# Patient Record
Sex: FEMALE | Race: WHITE | NOT HISPANIC OR LATINO | Employment: UNEMPLOYED | URBAN - METROPOLITAN AREA
[De-identification: names, ages, dates, MRNs, and addresses within clinical notes are randomized per-mention and may not be internally consistent; named-entity substitution may affect disease eponyms.]

---

## 2017-03-30 ENCOUNTER — TELEPHONE (OUTPATIENT)
Dept: OBGYN | Facility: CLINIC | Age: 32
End: 2017-03-30

## 2017-05-02 ENCOUNTER — OFFICE VISIT (OUTPATIENT)
Dept: FAMILY MEDICINE | Facility: CLINIC | Age: 32
End: 2017-05-02
Payer: COMMERCIAL

## 2017-05-02 VITALS
BODY MASS INDEX: 26.59 KG/M2 | DIASTOLIC BLOOD PRESSURE: 64 MMHG | WEIGHT: 144.5 LBS | HEIGHT: 62 IN | HEART RATE: 84 BPM | SYSTOLIC BLOOD PRESSURE: 103 MMHG | TEMPERATURE: 97.7 F

## 2017-05-02 DIAGNOSIS — N36.8 MASS OF URETHRA: ICD-10-CM

## 2017-05-02 DIAGNOSIS — Z01.419 ENCOUNTER FOR GYNECOLOGICAL EXAMINATION WITHOUT ABNORMAL FINDING: Primary | ICD-10-CM

## 2017-05-02 PROCEDURE — 99395 PREV VISIT EST AGE 18-39: CPT | Performed by: NURSE PRACTITIONER

## 2017-05-02 PROCEDURE — 87624 HPV HI-RISK TYP POOLED RSLT: CPT | Performed by: NURSE PRACTITIONER

## 2017-05-02 PROCEDURE — G0145 SCR C/V CYTO,THINLAYER,RESCR: HCPCS | Performed by: NURSE PRACTITIONER

## 2017-05-02 NOTE — PATIENT INSTRUCTIONS
Preventive Health Recommendations  Female Ages 26 - 39  Yearly exam:   See your health care provider every year in order to    Review health changes.     Discuss preventive care.      Review your medicines if you your doctor has prescribed any.    Until age 30: Get a Pap test every three years (more often if you have had an abnormal result).    After age 30: Talk to your doctor about whether you should have a Pap test every 3 years or have a Pap test with HPV screening every 5 years.   You do not need a Pap test if your uterus was removed (hysterectomy) and you have not had cancer.  You should be tested each year for STDs (sexually transmitted diseases), if you're at risk.   Talk to your provider about how often to have your cholesterol checked.  If you are at risk for diabetes, you should have a diabetes test (fasting glucose).  Shots: Get a flu shot each year. Get a tetanus shot every 10 years.   Nutrition:     Eat at least 5 servings of fruits and vegetables each day.    Eat whole-grain bread, whole-wheat pasta and brown rice instead of white grains and rice.    Talk to your provider about Calcium and Vitamin D.     Lifestyle    Exercise at least 150 minutes a week (30 minutes a day, 5 days of the week). This will help you control your weight and prevent disease.    Limit alcohol to one drink per day.    No smoking.     Wear sunscreen to prevent skin cancer.    See your dentist every six months for an exam and cleaning.        Thank you for choosing [facility].  You may be receiving a survey in the mail from [employer] regarding your visit today.  Please take a few minutes to complete and return the survey to let us know how we are doing.      If you have questions or concerns, please contact us via Cityzenith or you can contact your care team at [phone].    Our Clinic hours are:  Monday 6:40 am  to 7:00 pm  Tuesday -Friday 6:40 am to 5:00 pm    The [place] outpatient lab hours are:  Monday - Friday  6:10 am to 4:45 pm  Saturdays 7:00 am to 11:00 am  Appointments are required, call [phone]    If you have clinical questions after hours or would like to schedule an appointment,  call the clinic at [phone].

## 2017-05-02 NOTE — NURSING NOTE
"Chief Complaint   Patient presents with     Physical     Mass     Lump on Urethra      Health Maintenance     declined tetanus injection        Initial /64 (BP Location: Left arm, Patient Position: Chair, Cuff Size: Adult Regular)  Pulse 84  Temp 97.7  F (36.5  C) (Tympanic)  Ht 5' 2.25\" (1.581 m)  Wt 144 lb 8 oz (65.5 kg)  LMP 04/16/2017  Breastfeeding? No  BMI 26.22 kg/m2 Estimated body mass index is 26.22 kg/(m^2) as calculated from the following:    Height as of this encounter: 5' 2.25\" (1.581 m).    Weight as of this encounter: 144 lb 8 oz (65.5 kg).  Medication Reconciliation: complete    "

## 2017-05-02 NOTE — MR AVS SNAPSHOT
After Visit Summary   5/2/2017    Yvette Weiss    MRN: [id number]           Patient Information     Date Of Birth          1985        Visit Information        Provider Department      5/2/2017 11:00 Naa Valenzuela APRN [facility]        Today's Diagnoses     Encounter for gynecological examination without abnormal finding    -  1    Mass of urethra          Care Instructions      Preventive Health Recommendations  Female Ages 26 - 39  Yearly exam:   See your health care provider every year in order to    Review health changes.     Discuss preventive care.      Review your medicines if you your doctor has prescribed any.    Until age 30: Get a Pap test every three years (more often if you have had an abnormal result).    After age 30: Talk to your doctor about whether you should have a Pap test every 3 years or have a Pap test with HPV screening every 5 years.   You do not need a Pap test if your uterus was removed (hysterectomy) and you have not had cancer.  You should be tested each year for STDs (sexually transmitted diseases), if you're at risk.   Talk to your provider about how often to have your cholesterol checked.  If you are at risk for diabetes, you should have a diabetes test (fasting glucose).  Shots: Get a flu shot each year. Get a tetanus shot every 10 years.   Nutrition:     Eat at least 5 servings of fruits and vegetables each day.    Eat whole-grain bread, whole-wheat pasta and brown rice instead of white grains and rice.    Talk to your provider about Calcium and Vitamin D.     Lifestyle    Exercise at least 150 minutes a week (30 minutes a day, 5 days of the week). This will help you control your weight and prevent disease.    Limit alcohol to one drink per day.    No smoking.     Wear sunscreen to prevent skin cancer.    See your dentist every six months for an exam and cleaning.        Thank you for choosing [facility]  You may be  receiving a survey in the mail from [employer] regarding your visit today.  Please take a few minutes to complete and return the survey to let us know how we are doing.      If you have questions or concerns, please contact us via Emulate or you can contact your care team at [phone].    Our Clinic hours are:  Monday 6:40 am  to 7:00 pm  Tuesday -Friday 6:40 am to 5:00 pm    The [place] outpatient lab hours are:  Monday - Friday 6:10 am to 4:45 pm  Saturdays 7:00 am to 11:00 am  Appointments are required, call [phone]    If you have clinical questions after hours or would like to schedule an appointment,  call the clinic at [phone].          Follow-ups after your visit        Additional Services     UROLOGY ADULT REFERRAL       Your provider has referred you to: WALKER: [facility] [phone]   [email or web address]    Please be aware that coverage of these services is subject to the terms and limitations of your health insurance plan.  Call member services at your health plan with any benefit or coverage questions.      Please bring the following with you to your appointment:    (1) Any X-Rays, CTs or MRIs which have been performed.  Contact the facility where they were done to arrange for  prior to your scheduled appointment.    (2) List of current medications  (3) This referral request   (4) Any documents/labs given to you for this referral                  Future tests that were ordered for you today     Open Future Orders        Priority Expected Expires Ordered    Lipid panel reflex to direct LDL Routine  6/2/2017 5/2/2017    Glucose Routine  6/2/2017 5/2/2017            Who to contact     If you have questions or need follow up information about today's clinic visit or your schedule please contact [facility] directly at [phone].  Normal or non-critical lab and imaging results will be communicated to you by Elliot,  "letter or phone within 4 business days after the clinic has received the results. If you do not hear from us within 7 days, please contact the clinic through VoiceGem or phone. If you have a critical or abnormal lab result, we will notify you by phone as soon as possible.  Submit refill requests through VoiceGem or call your pharmacy and they will forward the refill request to us. Please allow 3 business days for your refill to be completed.          Additional Information About Your Visit        Managed Systemshardoggyloot Information     Voice[employer]em gives you secure access to your electronic health record. If you see a primary care provider, you can also send messages to your care team and make appointments. If you have questions, please call your primary care clinic.  If you do not have a primary care provider, please call [phone] and they will assist you.        Care EveryWhere ID     This is your Care EveryWhere ID. This could be used by other organizations to access your [place] medical records  [id number]        Your Vitals Were     Pulse Temperature Height Last Period Breastfeeding? BMI (Body Mass Index)    84 97.7  F (36.5  C) (Tympanic) 5' 2.25\" (1.581 m) 04/16/2017 No 26.22 kg/m2       Blood Pressure from Last 3 Encounters:   05/02/17 103/64   07/28/16 102/68   03/04/16 112/69    Weight from Last 3 Encounters:   05/02/17 144 lb 8 oz (65.5 kg)   07/28/16 145 lb (65.8 kg)   03/04/16 154 lb 12.8 oz (70.2 kg)              We Performed the Following     HPV High Risk Types DNA Cervical     Pap imaged thin layer screen with HPV - recommended age 30 - 65 years (select HPV order below)     UROLOGY ADULT REFERRAL        Primary Care Provider    Clinic Unassigned Corky Hong MD       No address on file        Thank you!     Thank you for choosing [facility]  for your care. Our goal is always to provide you with excellent care. Hearing back from our patients is one way we can continue to improve our services. Please take " a few minutes to complete the written survey that you may receive in the mail after your visit with us. Thank you!             Your Updated Medication List - Protect others around you: Learn how to safely use, store and throw away your medicines at www.disposemymeds.org.          This list is accurate as of: 5/2/17 11:32 AM.  Always use your most recent med list.                   Brand Name Dispense Instructions for use    KRILL OIL PO      Take 2 capsules by mouth daily       OMEGA-3 FATTY ACIDS PO      Take 1 capsule by mouth daily       prenatal multivitamin  plus iron 27-0.8 MG Tabs per tablet     100 tablet    Take 1 tablet by mouth daily       VITAMIN D PO      Take 1,000 Units by mouth daily

## 2017-05-02 NOTE — PROGRESS NOTES
SUBJECTIVE:     CC: Yvette Weiss is an 32 year old woman who presents for preventive health visit.   Chief Complaint   Patient presents with     Physical     Mass     Lump on Urethra      Health Maintenance     declined tetanus injection        Answers for HPI/ROS submitted by the patient on 4/29/2017   Annual Exam:  Getting at least 3 servings of Calcium per day:: Yes  Bi-annual eye exam:: NO  Dental care twice a year:: NO  Sleep apnea or symptoms of sleep apnea:: None  Diet:: Regular (no restrictions)  Frequency of exercise:: 4-5 days/week  Taking medications regularly:: Yes  Medication side effects:: None  Additional concerns today:: YES  Q1: Little interest or pleasure in doing things: 0=Not at all  Q2: Feeling down, depressed or hopeless: 0=Not at all  PHQ-2 Score: 0  Duration of exercise:: 30-45 minutes        Lump on Urethra, Painful       Duration: 2 months ago     Description (location/character/radiation): painful lump on urethra, changes in size     Intensity: mild to  moderate    Accompanying signs and symptoms: when lump is larger in size she states it makes it hard for urine to come out     History (similar episodes/previous evaluation): None    Precipitating or alleviating factors: None    Therapies tried and outcome: None       Today's PHQ-2 Score:   PHQ-2 ( 1999 Pfizer) 5/2/2017 4/29/2017   Q1: Little interest or pleasure in doing things 1 -   Q2: Feeling down, depressed or hopeless 0 -   PHQ-2 Score 1 -   Little interest or pleasure in doing things - Not at all   Feeling down, depressed or hopeless - Not at all   PHQ-2 Score - 0       Abuse: Current or Past(Physical, Sexual or Emotional)- No  Do you feel safe in your environment - Yes    Social History   Substance Use Topics     Smoking status: Former Smoker     Quit date: 1/20/2010     Smokeless tobacco: Never Used     Alcohol use Yes      Comment: occasional- quit with pregnancy     The patient does not drink >3 drinks per day nor >7 drinks  "per week.    No results for input(s): CHOL, HDL, LDL, TRIG, CHOLHDLRATIO, NHDL in the last 61636 hours.    Reviewed orders with patient.  Reviewed health maintenance and updated orders accordingly - Yes    Mammo Decision Support:  Mammogram not appropriate for this patient based on age.    Pertinent mammograms are reviewed under the imaging tab.    History of abnormal Pap smear: NO - age 30- 65 PAP every 3 years recommended    Reviewed and updated as needed this visit by clinical staff  Tobacco  Allergies  Meds  Med Hx  Surg Hx  Fam Hx  Soc Hx        Reviewed and updated as needed this visit by Provider            ROS:  C: NEGATIVE for fever, chills, change in weight  I: NEGATIVE for worrisome rashes, moles or lesions  E: NEGATIVE for vision changes or irritation  ENT: NEGATIVE for ear, mouth and throat problems  R: NEGATIVE for significant cough or SOB  B: NEGATIVE for masses, tenderness or discharge  CV: NEGATIVE for chest pain, palpitations or peripheral edema  GI: NEGATIVE for nausea, abdominal pain, heartburn, or change in bowel habits  : NEGATIVE for unusual urinary or vaginal symptoms. Periods are regular. Using condoms for birth control.  M: NEGATIVE for significant arthralgias or myalgia  N: NEGATIVE for weakness, dizziness or paresthesias  P: NEGATIVE for changes in mood or affect    Problem list, Medication list, Allergies, and Medical/Social/Surgical histories reviewed in EPIC and updated as appropriate.      OBJECTIVE:     /64 (BP Location: Left arm, Patient Position: Chair, Cuff Size: Adult Regular)  Pulse 84  Temp 97.7  F (36.5  C) (Tympanic)  Ht 5' 2.25\" (1.581 m)  Wt 144 lb 8 oz (65.5 kg)  LMP 04/16/2017  Breastfeeding? No  BMI 26.22 kg/m2  EXAM:  GENERAL: healthy, alert and no distress  EYES: Eyes grossly normal to inspection, PERRL and conjunctivae and sclerae normal  HENT: ear canals and TM's normal, nose and mouth without ulcers or lesions  NECK: no adenopathy, no asymmetry, " "masses, or scars and thyroid normal to palpation  RESP: lungs clear to auscultation - no rales, rhonchi or wheezes  BREAST: normal without masses, tenderness or nipple discharge and no palpable axillary masses or adenopathy  CV: regular rate and rhythm, normal S1 S2, no S3 or S4, no murmur, click or rub, no peripheral edema and peripheral pulses strong  ABDOMEN: soft, nontender, no hepatosplenomegaly, no masses and bowel sounds normal   (female): normal female external genitalia, vaginal mucosa pink, moist, well rugated and normal cervix, adnexae, and uterus without masses. There is a prominent 3 mm flesh colored lesion at the urethral opening.  MS: no gross musculoskeletal defects noted, no edema  SKIN: no suspicious lesions or rashes  NEURO: Normal strength and tone, mentation intact and speech normal  PSYCH: mentation appears normal, affect normal/bright    ASSESSMENT/PLAN:         ICD-10-CM    1. Encounter for gynecological examination without abnormal finding Z01.419 Pap imaged thin layer screen with HPV - recommended age 30 - 65 years (select HPV order below)     HPV High Risk Types DNA Cervical     Lipid panel reflex to direct LDL     Glucose   2. Mass of urethra N36.8 Etiology unclear: polyp vs cyst vs other.  Patient states that it intermittently swells and then blocks the flow of urine.  UROLOGY ADULT REFERRAL       COUNSELING:   Reviewed preventive health counseling, as reflected in patient instructions         reports that she quit smoking about 7 years ago. She has never used smokeless tobacco.    Estimated body mass index is 26.22 kg/(m^2) as calculated from the following:    Height as of this encounter: 5' 2.25\" (1.581 m).    Weight as of this encounter: 144 lb 8 oz (65.5 kg).   Weight management plan: Discussed healthy diet and exercise guidelines and patient will follow up in 12 months in clinic to re-evaluate.      The risks, benefits and treatment options of prescribed medications or other " treatments have been discussed with the patient. The patient verbalized their understanding and should call or follow up if no improvement or if they develop further problems.    JUNE Martin [facility]

## 2017-05-03 DIAGNOSIS — Z01.419 ENCOUNTER FOR GYNECOLOGICAL EXAMINATION WITHOUT ABNORMAL FINDING: ICD-10-CM

## 2017-05-03 LAB
CHOLEST SERPL-MCNC: 177 MG/DL
GLUCOSE SERPL-MCNC: 91 MG/DL (ref 70–99)
HDLC SERPL-MCNC: 77 MG/DL
LDLC SERPL CALC-MCNC: 83 MG/DL
NONHDLC SERPL-MCNC: 100 MG/DL
TRIGL SERPL-MCNC: 87 MG/DL

## 2017-05-03 PROCEDURE — 82947 ASSAY GLUCOSE BLOOD QUANT: CPT | Performed by: FAMILY MEDICINE

## 2017-05-03 PROCEDURE — 36415 COLL VENOUS BLD VENIPUNCTURE: CPT | Performed by: FAMILY MEDICINE

## 2017-05-03 PROCEDURE — 80061 LIPID PANEL: CPT | Performed by: FAMILY MEDICINE

## 2017-05-04 LAB
COPATH REPORT: NORMAL
PAP: NORMAL

## 2017-05-05 LAB
FINAL DIAGNOSIS: NORMAL
HPV HR 12 DNA CVX QL NAA+PROBE: NEGATIVE
HPV16 DNA SPEC QL NAA+PROBE: NEGATIVE
HPV18 DNA SPEC QL NAA+PROBE: NEGATIVE
SPECIMEN DESCRIPTION: NORMAL

## 2017-05-30 ENCOUNTER — OFFICE VISIT (OUTPATIENT)
Dept: UROLOGY | Facility: CLINIC | Age: 32
End: 2017-05-30
Payer: COMMERCIAL

## 2017-05-30 VITALS
SYSTOLIC BLOOD PRESSURE: 133 MMHG | WEIGHT: 144 LBS | BODY MASS INDEX: 26.13 KG/M2 | OXYGEN SATURATION: 100 % | HEART RATE: 72 BPM | DIASTOLIC BLOOD PRESSURE: 62 MMHG | RESPIRATION RATE: 16 BRPM

## 2017-05-30 DIAGNOSIS — R30.0 DYSURIA: Primary | ICD-10-CM

## 2017-05-30 LAB
ALBUMIN UR-MCNC: NEGATIVE MG/DL
APPEARANCE UR: CLEAR
BILIRUB UR QL STRIP: NEGATIVE
COLOR UR AUTO: YELLOW
GLUCOSE UR STRIP-MCNC: NEGATIVE MG/DL
HGB UR QL STRIP: NEGATIVE
KETONES UR STRIP-MCNC: NEGATIVE MG/DL
LEUKOCYTE ESTERASE UR QL STRIP: NEGATIVE
NITRATE UR QL: NEGATIVE
PH UR STRIP: 6 PH (ref 5–7)
SP GR UR STRIP: 1.01 (ref 1–1.03)
URN SPEC COLLECT METH UR: NORMAL
UROBILINOGEN UR STRIP-ACNC: 0.2 EU/DL (ref 0.2–1)

## 2017-05-30 PROCEDURE — 99243 OFF/OP CNSLTJ NEW/EST LOW 30: CPT | Mod: 25 | Performed by: UROLOGY

## 2017-05-30 PROCEDURE — 81003 URINALYSIS AUTO W/O SCOPE: CPT | Performed by: UROLOGY

## 2017-05-30 PROCEDURE — 52000 CYSTOURETHROSCOPY: CPT | Performed by: UROLOGY

## 2017-05-30 NOTE — MR AVS SNAPSHOT
After Visit Summary   5/30/2017    Yvette Weiss    MRN: [id number]           Patient Information     Date Of Birth          1985        Visit Information        Provider Department      5/30/2017 11:00 AM Andrew Harding MD [facility]        Today's Diagnoses     Dysuria    -  1       Follow-ups after your visit        Follow-up notes from your care team     Return if symptoms worsen or fail to improve.      Who to contact     If you have questions or need follow up information about today's clinic visit or your schedule please contact [facility] directly at [phone].  Normal or non-critical lab and imaging results will be communicated to you by MyChart, letter or phone within 4 business days after the clinic has received the results. If you do not hear from us within 7 days, please contact the clinic through [employer] or phone. If you have a critical or abnormal lab result, we will notify you by phone as soon as possible.  Submit refill requests through [employer] or call your pharmacy and they will forward the refill request to us. Please allow 3 business days for your refill to be completed.          Additional Information About Your Visit        MyChart Information     [employer] gives you secure access to your electronic health record. If you see a primary care provider, you can also send messages to your care team and make appointments. If you have questions, please call your primary care clinic.  If you do not have a primary care provider, please call [phone] and they will assist you.        Care EveryWhere ID     This is your Care EveryWhere ID. This could be used by other organizations to access your [place] medical records  [id number]        Your Vitals Were     Pulse Respirations Last Period Pulse Oximetry BMI (Body Mass Index)       72 16 04/16/2017 100% 26.13 kg/m2        Blood Pressure from Last 3 Encounters:   05/30/17 133/62   05/02/17 103/64    07/28/16 102/68    Weight from Last 3 Encounters:   05/30/17 144 lb (65.3 kg)   05/02/17 144 lb 8 oz (65.5 kg)   07/28/16 145 lb (65.8 kg)              We Performed the Following     *UA reflex to Microscopic and Culture ([place] [facility] (except [place] and [place])     CYSTOURETHROSCOPY        Primary Care Provider    Clinic Unassigned Corky Hong MD       No address on file        Thank you!     Thank you for choosing [facility]  for your care. Our goal is always to provide you with excellent care. Hearing back from our patients is one way we can continue to improve our services. Please take a few minutes to complete the written survey that you may receive in the mail after your visit with us. Thank you!             Your Updated Medication List - Protect others around you: Learn how to safely use, store and throw away your medicines at www.disposemymeds.org.          This list is accurate as of: 5/30/17 12:29 PM.  Always use your most recent med list.                   Brand Name Dispense Instructions for use    KRILL OIL PO      Take 2 capsules by mouth daily       OMEGA-3 FATTY ACIDS PO      Take 1 capsule by mouth daily       prenatal multivitamin  plus iron 27-0.8 MG Tabs per tablet     100 tablet    Take 1 tablet by mouth daily       VITAMIN D PO      Take 1,000 Units by mouth daily

## 2017-05-30 NOTE — NURSING NOTE
"Chief Complaint   Patient presents with     Consult     urinary incont and lump       Initial /62 (BP Location: Right arm, Patient Position: Chair, Cuff Size: Adult Regular)  Pulse 72  Resp 16  Wt 144 lb (65.3 kg)  LMP 04/16/2017  SpO2 100%  BMI 26.13 kg/m2 Estimated body mass index is 26.13 kg/(m^2) as calculated from the following:    Height as of 5/2/17: 5' 2.25\" (1.581 m).    Weight as of this encounter: 144 lb (65.3 kg).  Medication Reconciliation: complete   Ernestina Mendes LPN    "

## 2017-05-30 NOTE — PROGRESS NOTES
"Yvette Weiss is a 32 year old female seen in consultation for urethral lesion. Consult from Unassigned [facility].    Pt reports onset of urethral lesion about 3 wks ago, intermittent, \"it moves around,\" minimally tender if she presses on it when it is there, otherwise no pain, no hx similar issues. Was recently seen here and found to have: \"There is a prominent 3 mm flesh colored lesion at the urethral opening\" on exam on 5/2/17    Also voids about q 90 minutes during the day, noc x 3.    Rare BUD, primarily just when she was pregnant. Does not wear a pad for bladder control and does not soil her underwear.     Denies dysuria, gross hematuria, UTI's, prior  eval, bladder surgery, use of bladder meds.    Hx 2 vag deliveries, considering additional offspring.    Performs Kegel exercises on a regular basis, not sure if it is helping.    Some issues with constipation, BM now about q 2-3 days, does not feel constipated.    Drinks 3 caf/sugar Pepsi q day, 2.5 [place], one herbal tea.     Stay-at-home mom. 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     Social History Main Topics     Smoking status: Former Smoker     Quit date: 1/20/2010     Smokeless tobacco: Never Used     Alcohol use Yes      Comment: occasional- quit with pregnancy     Drug use: No     Sexual activity: Yes     Partners: Male     Other Topics Concern     Parent/Sibling W/ Cabg, Mi Or Angioplasty Before 65f 55m? No     Adopted     Social History Narrative "       Current Outpatient Prescriptions   Medication Sig Dispense Refill     OMEGA-3 FATTY ACIDS PO Take 1 capsule by mouth daily        Prenatal Vit-Fe Fumarate-FA (PRENATAL MULTIVITAMIN  PLUS IRON) 27-0.8 MG TABS Take 1 tablet by mouth daily (Patient taking differently: Take 1 tablet by mouth daily ) 100 tablet 0     KRILL OIL PO Take 2 capsules by mouth daily        Cholecalciferol (VITAMIN D PO) Take 1,000 Units by mouth daily          Physical Exam:    GENL: NAD.    ABD: Soft, non-tender, no masses.    EG: Well-estrogenized, no masses; pt points to her clitoris as the 'lump' > non inflamed, non tender.    VAGINA: Well-estrogenized, no masses.    BN HYPERMOBILITY: Mild.    CYSTOCELE: None.    APICAL PROLAPSE: None.    RECTOCELE: None.    BIMANUAL: No mass or tenderness.    Cysto:    (Informed consent obtained. Pause for cause performed)   Sterile prep.    17 Fr scope inserted through urethra. Systematic examination w 70 degree lens.   PVR: 20 cc   MUCOSA: Normal without lesion; mild trabeculation throughout   ORIFICES: Normal location and morphology   CAPACITY: 400 cc; no pain with filling   Scope withdrawn without untoward effect.    Valsalva:   Minimal hypermobility, no leakage noted.    (Pt tolerated procedure without difficulty).      Results for orders placed or performed in visit on 05/30/17   *UA reflex to Microscopic and Culture ([place] and [facility] (except [place] and [place])   Result Value Ref Range    Color Urine Yellow     Appearance Urine Clear     Glucose Urine Negative NEG mg/dL    Bilirubin Urine Negative NEG    Ketones Urine Negative NEG mg/dL    Specific Gravity Urine 1.010 1.003 - 1.035    Blood Urine Negative NEG    pH Urine 6.0 5.0 - 7.0 pH    Protein Albumin Urine Negative NEG mg/dL    Urobilinogen Urine 0.2 0.2 - 1.0 EU/dL    Nitrite Urine Negative NEG    Leukocyte Esterase Urine Negative NEG    Source Midstream Urine            IMP:  1. Normal external genitalia on exam today;  sounds like may be urethral polyp; discussed in some detail  2. Hx minimal BUD; unable to replicate on exam today  3. Fair bit of Pepsi >> urinary frequency      PLAN:  1. Discussed situation with patient in detail, including negative findings on exam  2. Discussed her hx BUD; could address surgically if it becomes more problematic  3. Consider moderation of soda  4. RTC to us only PRN recurrence  5. 45 minutes spent with patient, more than 50% spent in counseling and coordination of care for urethral lesion.  6. Copy TRI [place]

## 2017-09-27 ENCOUNTER — OFFICE VISIT (OUTPATIENT)
Dept: FAMILY MEDICINE | Facility: CLINIC | Age: 32
End: 2017-09-27
Payer: COMMERCIAL

## 2017-09-27 ENCOUNTER — OFFICE VISIT (OUTPATIENT)
Dept: UROLOGY | Facility: CLINIC | Age: 32
End: 2017-09-27
Payer: COMMERCIAL

## 2017-09-27 VITALS
BODY MASS INDEX: 25.76 KG/M2 | HEIGHT: 62 IN | WEIGHT: 140 LBS | HEART RATE: 89 BPM | DIASTOLIC BLOOD PRESSURE: 63 MMHG | TEMPERATURE: 98.3 F | SYSTOLIC BLOOD PRESSURE: 111 MMHG

## 2017-09-27 DIAGNOSIS — R30.0 DYSURIA: Primary | ICD-10-CM

## 2017-09-27 DIAGNOSIS — N36.8 PERIURETHRAL MASS: Primary | ICD-10-CM

## 2017-09-27 LAB
ALBUMIN UR-MCNC: NEGATIVE MG/DL
APPEARANCE UR: CLEAR
BILIRUB UR QL STRIP: NEGATIVE
COLOR UR AUTO: YELLOW
GLUCOSE UR STRIP-MCNC: NEGATIVE MG/DL
HGB UR QL STRIP: NEGATIVE
KETONES UR STRIP-MCNC: NEGATIVE MG/DL
LEUKOCYTE ESTERASE UR QL STRIP: ABNORMAL
NITRATE UR QL: NEGATIVE
PH UR STRIP: 7 PH (ref 5–7)
RBC #/AREA URNS AUTO: ABNORMAL /HPF
SOURCE: ABNORMAL
SP GR UR STRIP: 1.01 (ref 1–1.03)
UROBILINOGEN UR STRIP-ACNC: 0.2 EU/DL (ref 0.2–1)
WBC #/AREA URNS AUTO: ABNORMAL /HPF

## 2017-09-27 PROCEDURE — 99213 OFFICE O/P EST LOW 20 MIN: CPT | Performed by: UROLOGY

## 2017-09-27 PROCEDURE — 99213 OFFICE O/P EST LOW 20 MIN: CPT | Performed by: FAMILY MEDICINE

## 2017-09-27 PROCEDURE — 81001 URINALYSIS AUTO W/SCOPE: CPT | Performed by: UROLOGY

## 2017-09-27 RX ORDER — LANOLIN ALCOHOL/MO/W.PET/CERES
CREAM (GRAM) TOPICAL DAILY
COMMUNITY
End: 2020-04-02

## 2017-09-27 NOTE — PROGRESS NOTES
SUBJECTIVE:   Yvette Weiss is a 32 year old female who presents to clinic today for the following health issues:      Chief Complaint   Patient presents with     Mass     Lump on urethra, has had 4 times in the past year, usually last about 1 week, lump comes and goes, is painful, size of small pea, was seen 5/30 with urology - lump was resolved at the time of that appt.       Imm/Inj     declines tetanus today     Flu Shot     declines         URINARY TRACT SYMPTOMS      Duration: 3 days    Description  Small pea-sized slightly painful lump around the urethral meatus causing some difficulty urinating (pt reports needing to strain sometimes to urinate)    Intensity:  mild    Accompanying signs and symptoms:  Fever/chills: no   Flank pain no   Nausea and vomiting: no   Vaginal symptoms: patient denies discharge, abnormal bleeding or foul smell  Abdominal/Pelvic Pain: no     History  History of frequent UTI's: no   History of kidney stones: no   Sexually Active: YES    Precipitating or alleviating factors: in past would spontaneously resolve    Therapies tried and outcome: none   Outcome: n/a    Verified above history with patient.  When she was seen by Dr. Harding few months ago, normal exam then and was advised expectant management.    Problem list and histories reviewed & adjusted, as indicated.  Additional history: as documented    Patient Active Problem List   Diagnosis     Lateral epicondylitis     CARDIOVASCULAR SCREENING; LDL GOAL LESS THAN 160     24 hour clinic contact list given     Encounter for supervision of other normal pregnancy     Diet controlled gestational diabetes mellitus in third trimester     Encounter for trial of labor     Prenatal care, subsequent pregnancy     Vaginal delivery     Past Surgical History:   Procedure Laterality Date     ORTHOPEDIC SURGERY  1997    ORIF left wrist       Social History   Substance Use Topics     Smoking status: Former Smoker     Quit date: 1/20/2010      "Smokeless tobacco: Never Used     Alcohol use No     Family History   Problem Relation Age of Onset     Adopted: Yes     Alcohol/Drug Mother      A&D     Alcohol/Drug Father      A&D     Parkinsonism Paternal Grandmother      Other - See Comments Paternal Grandmother      parkinsons     CEREBROVASCULAR DISEASE Maternal Grandmother      x3     Other - See Comments Sister      fetal alcohol syndrome     CANCER Maternal Grandfather      lung     Other Cancer Paternal Grandfather      Lung         Current Outpatient Prescriptions   Medication Sig Dispense Refill     cyanocobalamin (VITAMIN  B-12) 1000 MCG tablet Take by mouth daily       FOLIC ACID PO Take 1 mg by mouth daily       OMEGA-3 FATTY ACIDS PO Take 1 capsule by mouth daily        Prenatal Vit-Fe Fumarate-FA (PRENATAL MULTIVITAMIN  PLUS IRON) 27-0.8 MG TABS Take 1 tablet by mouth daily (Patient taking differently: Take 1 tablet by mouth daily ) 100 tablet 0     KRILL OIL PO Take 2 capsules by mouth daily        Cholecalciferol (VITAMIN D PO) Take 1,000 Units by mouth daily        Allergies   Allergen Reactions     Latex Hives         Reviewed and updated as needed this visit by clinical staff  Tobacco  Allergies  Med Hx  Surg Hx  Fam Hx  Soc Hx      Reviewed and updated as needed this visit by Provider         ROS:  C: NEGATIVE for fever, chills, change in weight  I: NEGATIVE for worrisome rashes, moles or lesions  GI: NEGATIVE for nausea, abdominal pain, heartburn, or change in bowel habits   female: see above  H: NEGATIVE for bleeding problems    OBJECTIVE:                                                    /63 (BP Location: Right arm, Patient Position: Chair, Cuff Size: Adult Regular)  Pulse 89  Temp 98.3  F (36.8  C) (Tympanic)  Ht 5' 2.25\" (1.581 m)  Wt 140 lb (63.5 kg)  LMP 09/05/2017  BMI 25.4 kg/m2  Body mass index is 25.4 kg/(m^2).  GENERAL:  alert and no distress, ambulatory w/o assist  SKIN: no suspicious lesions, no " rashes    SPECULUM EXAM: deferred  EXT GENITALIA: normal vulva/labia; no visible external vaginal discharge; no active bleeding; urethra meatus non-erythematous but there is a 4 mm prominence just inferior to the meatus that was mildly TTP; no visible ulcer    Diagnostic test results:  Diagnostic Test Results:  none        ASSESSMENT/PLAN:                                                        ICD-10-CM    1. Periurethral mass N36.8      Mild but present tenderness and mass vs swelling inferior to urethra. Unclear to this provider exact etiology.  Since patient reports symptoms resolve spontaneously after 1 week, tried to get patient scheduled with urology in [place] specialty clinic today, but clinicunable to take her in and was advised to come to clinic next week.  Patient concurred.  No sign of abscess to warrant I&D or abx treatment.  Return precautions discussed and given to patient.      Follow up with Provider - prn   Patient Instructions         Thank you for choosing [facility].  You may be receiving a survey in the mail from [employer] [facility] regarding your visit today.  Please take a few minutes to complete and return the survey to let us know how we are doing.      If you have questions or concerns, please contact us via BigBad or you can contact your care team at [phone].    Our Clinic hours are:  Monday 6:40 am  to 7:00 pm  Tuesday -Friday 6:40 am to 5:00 pm    The [place] outpatient lab hours are:  Monday - Friday 6:10 am to 4:45 pm  Saturdays 7:00 am to 11:00 am  Appointments are required, call [phone]    If you have clinical questions after hours or would like to schedule an appointment,  call the clinic at [phone].        Nemesio Perkins MD  [facility]

## 2017-09-27 NOTE — PROGRESS NOTES
F/u urethral lesion, BUD    Pt states that she has experienced some 'enlargement' of there 'area' for the last 28 hrs, progressive; bothersome when she is sitting, more so when she is active.    Denies dysuria, gross hematuria, frequency. Resolution of BUD. Has stopped drinking Pepsi.      PE: Pt localizes issue to clitoris. Entire vag exam including bimanual normal.      Current Outpatient Prescriptions   Medication     cyanocobalamin (VITAMIN  B-12) 1000 MCG tablet     FOLIC ACID PO     OMEGA-3 FATTY ACIDS PO     Prenatal Vit-Fe Fumarate-FA (PRENATAL MULTIVITAMIN  PLUS IRON) 27-0.8 MG TABS     KRILL OIL PO     Cholecalciferol (VITAMIN D PO)     No current facility-administered medications for this visit.        Results for orders placed or performed in visit on 09/27/17   UA reflex to Microscopic and Culture   Result Value Ref Range    Color Urine Yellow     Appearance Urine Clear     Glucose Urine Negative NEG^Negative mg/dL    Bilirubin Urine Negative NEG^Negative    Ketones Urine Negative NEG^Negative mg/dL    Specific Gravity Urine 1.010 1.003 - 1.035    Blood Urine Negative NEG^Negative    pH Urine 7.0 5.0 - 7.0 pH    Protein Albumin Urine Negative NEG^Negative mg/dL    Urobilinogen Urine 0.2 0.2 - 1.0 EU/dL    Nitrite Urine Negative NEG^Negative    Leukocyte Esterase Urine Trace (A) NEG^Negative    Source Midstream Urine    Urine Microscopic   Result Value Ref Range    WBC Urine 2-5 (A) OTO2^O - 2 /HPF    RBC Urine O - 2 OTO2^O - 2 /HPF         IMP:  1. Clitoral issue; satisfactory  bladder exam      PLAN:  1. Discussed situation with patient in detail.  2. No indication for  intervention today  3. To see Gyn re further eval/rx  4. 15 minutes spent with patient, more than 50% spent in counseling and coordination of care for clitoral issue.  5. Copy PMD

## 2017-09-27 NOTE — MR AVS SNAPSHOT
After Visit Summary   9/27/2017    Yvette Weiss    MRN: [id number]           Patient Information     Date Of Birth          1985        Visit Information        Provider Department      9/27/2017 7:20 AM Nemesio Perkins MD [facility]        Today's Diagnoses     Periurethral mass    -  1      Care Instructions          Thank you for choosing [facility].  You may be receiving a survey in the mail from [facility] regarding your visit today.  Please take a few minutes to complete and return the survey to let us know how we are doing.      If you have questions or concerns, please contact us via [employer] or you can contact your care team at [phone].    Our Clinic hours are:  Monday 6:40 am  to 7:00 pm  Tuesday -Friday 6:40 am to 5:00 pm    The [place] outpatient lab hours are:  Monday - Friday 6:10 am to 4:45 pm  Saturdays 7:00 am to 11:00 am  Appointments are required, call [phone]    If you have clinical questions after hours or would like to schedule an appointment,  call the clinic at [phone].            Follow-ups after your visit        Follow-up notes from your care team     Return if symptoms worsen or fail to improve.      Your next 10 appointments already scheduled     Sep 28, 2017  9:00 AM CDT   Office Visit with Jodi Yusuf MD   [facility] ([facility]    [address] [address] [phone]           Bring a current list of meds and any records pertaining to this visit. For Physicals, please bring immunization records and any forms needing to be filled out. Please arrive 10 minutes early to complete paperwork.              Who to contact     If you have questions or need follow up information about today's clinic visit or your schedule please contact [facility] directly at [phone].  Normal or non-critical lab and imaging results will be communicated to you by  "MyChart, letter or phone within 4 business days after the clinic has received the results. If you do not hear from us within 7 days, please contact the clinic through [employer] or phone. If you have a critical or abnormal lab result, we will notify you by phone as soon as possible.  Submit refill requests through [employer] or call your pharmacy and they will forward the refill request to us. Please allow 3 business days for your refill to be completed.          Additional Information About Your Visit        [employer] Information     [employer] gives you secure access to your electronic health record. If you see a primary care provider, you can also send messages to your care team and make appointments. If you have questions, please call your primary care clinic.  If you do not have a primary care provider, please call [phone] and they will assist you.        Care EveryWhere ID     This is your Care EveryWhere ID. This could be used by other organizations to access your [place] medical records  [id number]        Your Vitals Were     Pulse Temperature Height Last Period BMI (Body Mass Index)       89 98.3  F (36.8  C) (Tympanic) 5' 2.25\" (1.581 m) 09/05/2017 25.4 kg/m2        Blood Pressure from Last 3 Encounters:   09/27/17 111/63   05/30/17 133/62   05/02/17 103/64    Weight from Last 3 Encounters:   09/27/17 140 lb (63.5 kg)   05/30/17 144 lb (65.3 kg)   05/02/17 144 lb 8 oz (65.5 kg)              Today, you had the following     No orders found for display       Primary Care Provider    None Specified       No primary provider on file.        Equal Access to Services     [facility]: Hadii ruba Desouza, wabernieda luqadaha, qaybta kaaljuan jose shaw . So [facility] [phone].    ATENCIÓN: Si habla español, tiene a nick disposición servicios gratuitos de asistencia lingüística. Llame al [phone].    We comply with applicable federal civil rights laws and [place] " laws. We do not discriminate on the basis of race, color, national origin, age, disability sex, sexual orientation or gender identity.            Thank you!     Thank you for choosing [facility]  for your care. Our goal is always to provide you with excellent care. Hearing back from our patients is one way we can continue to improve our services. Please take a few minutes to complete the written survey that you may receive in the mail after your visit with us. Thank you!             Your Updated Medication List - Protect others around you: Learn how to safely use, store and throw away your medicines at www.disposemymeds.org.          This list is accurate as of: 9/27/17  4:20 PM.  Always use your most recent med list.                   Brand Name Dispense Instructions for use Diagnosis    cyanocobalamin 1000 MCG tablet    vitamin  B-12     Take by mouth daily        FOLIC ACID PO      Take 1 mg by mouth daily        KRILL OIL PO      Take 2 capsules by mouth daily        OMEGA-3 FATTY ACIDS PO      Take 1 capsule by mouth daily        prenatal multivitamin plus iron 27-0.8 MG Tabs per tablet     100 tablet    Take 1 tablet by mouth daily    Positive pregnancy test       VITAMIN D PO      Take 1,000 Units by mouth daily

## 2017-09-27 NOTE — PATIENT INSTRUCTIONS
Thank you for choosing [facility].  You may be receiving a survey in the mail from [employer] regarding your visit today.  Please take a few minutes to complete and return the survey to let us know how we are doing.      If you have questions or concerns, please contact us via [employer] or you can contact your care team at [phone].    Our Clinic hours are:  Monday 6:40 am  to 7:00 pm  Tuesday -Friday 6:40 am to 5:00 pm    The [place] outpatient lab hours are:  Monday - Friday 6:10 am to 4:45 pm  Saturdays 7:00 am to 11:00 am  Appointments are required, call [phone]    If you have clinical questions after hours or would like to schedule an appointment,  call the clinic at [phone].

## 2017-09-27 NOTE — MR AVS SNAPSHOT
After Visit Summary   9/27/2017    Yvette Weiss    MRN: [id number]           Patient Information     Date Of Birth          1985        Visit Information        Provider Department      9/27/2017 1:15 PM Andrew Harding MD [facility]        Today's Diagnoses     Dysuria    -  1       Follow-ups after your visit        Follow-up notes from your care team     Return if symptoms worsen or fail to improve.      Who to contact     If you have questions or need follow up information about today's clinic visit or your schedule please contact [facility] directly at [phone].  Normal or non-critical lab and imaging results will be communicated to you by MyChart, letter or phone within 4 business days after the clinic has received the results. If you do not hear from us within 7 days, please contact the clinic through [employer] or phone. If you have a critical or abnormal lab result, we will notify you by phone as soon as possible.  Submit refill requests through [employer] or call your pharmacy and they will forward the refill request to us. Please allow 3 business days for your refill to be completed.          Additional Information About Your Visit        MyChart Information     [employer] gives you secure access to your electronic health record. If you see a primary care provider, you can also send messages to your care team and make appointments. If you have questions, please call your primary care clinic.  If you do not have a primary care provider, please call [phone] and they will assist you.        Care EveryWhere ID     This is your Care EveryWhere ID. This could be used by other organizations to access your [place] medical records  [id number]        Your Vitals Were     Last Period                   09/05/2017            Blood Pressure from Last 3 Encounters:   09/27/17 111/63   05/30/17 133/62   05/02/17 103/64    Weight from Last 3 Encounters:   09/27/17  63.5 kg (140 lb)   05/30/17 65.3 kg (144 lb)   05/02/17 65.5 kg (144 lb 8 oz)              We Performed the Following     UA reflex to Microscopic and Culture     Urine Microscopic        Primary Care Provider    None Specified       No primary provider on file.        Equal Access to Services     [facility] : Hadii aad ku hadpriyankpatricia Romilton, wabernieda luqadaha, qaybta kaalmada vanessacorinnebradley, juan jose azevedopranayjonna de la cruz. So [facility] [phone].    ATENCIÓN: Si habla español, tiene a nick disposición servicios gratuitos de asistencia lingüística. Llame al [phone].    We comply with applicable federal civil rights laws and [place] laws. We do not discriminate on the basis of race, color, national origin, age, disability sex, sexual orientation or gender identity.            Thank you!     Thank you for choosing [facility]  for your care. Our goal is always to provide you with excellent care. Hearing back from our patients is one way we can continue to improve our services. Please take a few minutes to complete the written survey that you may receive in the mail after your visit with us. Thank you!             Your Updated Medication List - Protect others around you: Learn how to safely use, store and throw away your medicines at www.disposemymeds.org.          This list is accurate as of: 9/27/17  1:51 PM.  Always use your most recent med list.                   Brand Name Dispense Instructions for use Diagnosis    cyanocobalamin 1000 MCG tablet    vitamin  B-12     Take by mouth daily        FOLIC ACID PO      Take 1 mg by mouth daily        KRILL OIL PO      Take 2 capsules by mouth daily        OMEGA-3 FATTY ACIDS PO      Take 1 capsule by mouth daily        prenatal multivitamin plus iron 27-0.8 MG Tabs per tablet     100 tablet    Take 1 tablet by mouth daily    Positive pregnancy test       VITAMIN D PO      Take 1,000 Units by mouth daily

## 2017-09-27 NOTE — NURSING NOTE
"Chief Complaint   Patient presents with     Mass     Lump on urethra, has had 4 times in the past year, usually last about 1 week, lump comes and goes, is painful, size of small pea, was seen 5/30 with urology - lump was resolved at the time of that appt.         Initial /63 (BP Location: Right arm, Patient Position: Chair, Cuff Size: Adult Regular)  Pulse 89  Temp 98.3  F (36.8  C) (Tympanic)  Ht 5' 2.25\" (1.581 m)  Wt 140 lb (63.5 kg)  LMP 08/07/2017  BMI 25.4 kg/m2 Estimated body mass index is 25.4 kg/(m^2) as calculated from the following:    Height as of this encounter: 5' 2.25\" (1.581 m).    Weight as of this encounter: 140 lb (63.5 kg).  Medication Reconciliation: complete  "

## 2017-09-28 ENCOUNTER — OFFICE VISIT (OUTPATIENT)
Dept: OBGYN | Facility: CLINIC | Age: 32
End: 2017-09-28
Payer: COMMERCIAL

## 2017-09-28 VITALS
SYSTOLIC BLOOD PRESSURE: 117 MMHG | TEMPERATURE: 98.1 F | BODY MASS INDEX: 25.32 KG/M2 | WEIGHT: 137.6 LBS | DIASTOLIC BLOOD PRESSURE: 65 MMHG | HEIGHT: 62 IN | HEART RATE: 87 BPM

## 2017-09-28 DIAGNOSIS — N90.89 CLITORAL IRRITATION: Primary | ICD-10-CM

## 2017-09-28 PROCEDURE — 99243 OFF/OP CNSLTJ NEW/EST LOW 30: CPT | Performed by: OBSTETRICS & GYNECOLOGY

## 2017-09-28 RX ORDER — AMOXICILLIN AND CLAVULANATE POTASSIUM 500; 125 MG/1; MG/1
1 TABLET, FILM COATED ORAL 2 TIMES DAILY
Qty: 20 TABLET | Refills: 0 | Status: SHIPPED | OUTPATIENT
Start: 2017-09-28 | End: 2017-12-11

## 2017-09-28 NOTE — NURSING NOTE
"Chief Complaint   Patient presents with     Consult     Cyst on clitoris       Initial /65 (BP Location: Right arm, Patient Position: Sitting, Cuff Size: Adult Regular)  Pulse 87  Temp 98.1  F (36.7  C) (Oral)  Ht 5' 2.25\" (1.581 m)  Wt 137 lb 9.6 oz (62.4 kg)  LMP 09/05/2017  Breastfeeding? No  BMI 24.97 kg/m2 Estimated body mass index is 24.97 kg/(m^2) as calculated from the following:    Height as of this encounter: 5' 2.25\" (1.581 m).    Weight as of this encounter: 137 lb 9.6 oz (62.4 kg).  Medication Reconciliation: complete   Suze Romero CMA      "

## 2017-09-28 NOTE — MR AVS SNAPSHOT
"              After Visit Summary   9/28/2017    Yvette Weiss    MRN: [id number]           Patient Information     Date Of Birth          1985        Visit Information        Provider Department      9/28/2017 9:00 AM Jodi Yusuf MD [facility]        Today's Diagnoses     Clitoral irritation    -  1       Follow-ups after your visit        Who to contact     If you have questions or need follow up information about today's clinic visit or your schedule please contact [facility] directly at [phone].  Normal or non-critical lab and imaging results will be communicated to you by [email or web address], letter or phone within 4 business days after the clinic has received the results. If you do not hear from us within 7 days, please contact the clinic through [employer] or phone. If you have a critical or abnormal lab result, we will notify you by phone as soon as possible.  Submit refill requests through [employer] or call your pharmacy and they will forward the refill request to us. Please allow 3 business days for your refill to be completed.          Additional Information About Your Visit        MyChart Information     [employer] gives you secure access to your electronic health record. If you see a primary care provider, you can also send messages to your care team and make appointments. If you have questions, please call your primary care clinic.  If you do not have a primary care provider, please call [phone] and they will assist you.        Care EveryWhere ID     This is your Care EveryWhere ID. This could be used by other organizations to access your [place] medical records  [id number]        Your Vitals Were     Pulse Temperature Height Last Period Breastfeeding? BMI (Body Mass Index)    87 98.1  F (36.7  C) (Oral) 5' 2.25\" (1.581 m) 09/05/2017 No 24.97 kg/m2       Blood Pressure from Last 3 Encounters:   09/28/17 117/65   09/27/17 111/63   05/30/17 133/62    Weight from Last " 3 Encounters:   09/28/17 137 lb 9.6 oz (62.4 kg)   09/27/17 140 lb (63.5 kg)   05/30/17 144 lb (65.3 kg)              Today, you had the following     No orders found for display         Today's Medication Changes          These changes are accurate as of: 9/28/17 11:59 PM.  If you have any questions, ask your nurse or doctor.               Start taking these medicines.        Dose/Directions    amoxicillin-clavulanate 500-125 MG per tablet   Commonly known as:  AUGMENTIN   Used for:  Clitoral irritation   Started by:  Jodi Yusuf MD        Dose:  1 tablet   Take 1 tablet by mouth 2 times daily   Quantity:  20 tablet   Refills:  0            Where to get your medicines      These medications were sent to [facility] [address]  [address] [address]     Phone:  [phone]     amoxicillin-clavulanate 500-125 MG per tablet                Primary Care Provider    None Specified       No primary provider on file.        Equal Access to Services     BLANCA HERNANDEZ : Hadii ruba ku hadasho Soomaali, waaxda luqadaha, qaybta kaalmada adeegyada, waxay ashley camejo . So [facility] [phone].    ATENCIÓN: Si habla español, tiene a nick disposición servicios gratuitos de asistencia lingüística. Llame al [phone].    We comply with applicable federal civil rights laws and [place] laws. We do not discriminate on the basis of race, color, national origin, age, disability, sex, sexual orientation, or gender identity.            Thank you!     Thank you for choosing [facility]  for your care. Our goal is always to provide you with excellent care. Hearing back from our patients is one way we can continue to improve our services. Please take a few minutes to complete the written survey that you may receive in the mail after your visit with us. Thank you!             Your Updated Medication List - Protect others around you: Learn how to  safely use, store and throw away your medicines at www.disposemymeds.org.          This list is accurate as of: 9/28/17 11:59 PM.  Always use your most recent med list.                   Brand Name Dispense Instructions for use Diagnosis    amoxicillin-clavulanate 500-125 MG per tablet    AUGMENTIN    20 tablet    Take 1 tablet by mouth 2 times daily    Clitoral irritation       cyanocobalamin 1000 MCG tablet    vitamin  B-12     Take by mouth daily        FOLIC ACID PO      Take 1 mg by mouth daily        KRILL OIL PO      Take 2 capsules by mouth daily        OMEGA-3 FATTY ACIDS PO      Take 1 capsule by mouth daily        prenatal multivitamin plus iron 27-0.8 MG Tabs per tablet     100 tablet    Take 1 tablet by mouth daily    Positive pregnancy test       VITAMIN D PO      Take 1,000 Units by mouth daily

## 2017-10-02 NOTE — PROGRESS NOTES
"OB/Gyn Consultation:    Yvette Weiss was sent to me for consultation by Dr Harding for evaluation of clitoral mass.      SUBJECTIVE:                                                   CC:  Patient presents with:  Consult: Cyst on clitoris      HPI:  Yvette Weiss is a 32 year old  who presents with a mass near the clitoris.  This is the fourth time in a year she has had a lump in this same spot.  In May she thought the lump was at the urethra and was sent to urology, but by the time she could get an appt it had gone away.  This time when it returned she got in right away with urology due to concern for it being on the urethra, but they diagnosed clitoral mass and sent her to gynecology.      She describes the lump as a small area which is minimally painful all the time, mostly just uncomfortable.  It is probably worse with irritating activities like wiping or intercourse, but she doesn't know specifically.  It is not a high level of pain, just general discomfort.  No vaginal discharge.  No fevers/chills.  No dysuria or hematuria.  Other than this year, hasn't had this happen before.  Is going to try to get pregnant starting in December and hoping it can get figured out before then.    ROS: 10 point ROS negative other than as listed above in HPI.    Gyn History:  Patient's last menstrual period was 2017.     Patient is sexually active.  Using condoms for contraception.   Recent pap smears:    Lab Results   Component Value Date    PAP NIL 2017    PAP NIL 2013    PAP NIL 2012       PMH, PSH, Soc Hx, Meds, and allergies reviewed in Epic.    OBJECTIVE:     /65 (BP Location: Right arm, Patient Position: Sitting, Cuff Size: Adult Regular)  Pulse 87  Temp 98.1  F (36.7  C) (Oral)  Ht 5' 2.25\" (1.581 m)  Wt 137 lb 9.6 oz (62.4 kg)  LMP 2017  Breastfeeding? No  BMI 24.97 kg/m2    Gen: Healthy appearing well nourished female, no acute distress, comfortable  HENT: " No scleral injection or icterus  CV: Regular rate  Resp: Normal work of breathing, no cough  GI: Abdomen soft, non-tender. No masses, organomegaly  Skin: No suspicious lesions or rashes  Psychiatric: mentation appears normal and affect bright  : Normal external female genitalia with normal hair distribution. There is a 0.5cm small cystic slightly fluctuant mass under the clitoral ng, but no area of drainage or pinpoint of coming to a head.  There is no erythema, no induration, no excoriation.    SSE: Speculum exam reveals vaginal epithelium well rugated with normal physiologic discharge. Cervix appears smooth, pink, with no visible lesions.   Bimanual exam reveals normal size uterus, non-tender, and mobile. No adnexal masses or tenderness. No cervical motion tenderness.     ASSESSMENT/PLAN:                                                      1. Clitoral irritation  Likely a small inclusion cyst of the clitoris, benign yet irritating especially in light of the fact they are going to try to get pregnant starting in December.  She has no s/s of infection and no s/s of extension of the cyst to deeper areas.  Discussed expectant vs medical vs surgical mgmt.  This has obviously gone away three times on its own prior and very well could do so again this time.  If she would like to try to expedite resolution of the cyst we can try an antibiotic.  She has no personal h/o MRSA, although her dad does have it -- but she rarely comes into direct contact with him since they don't live close to each other.  We also discussed that needle aspiration is unlikely to completely resolve the symptoms since often these cysts have thick fluid that can't be drained via needle.  Also discussed that we could go to the operating room to edilberto it, however if her pain isn't bad enough to warrant it yet, may be better to wait for now as there is a likelihood her pain would get much much worse before it improves.  She is amenable to this  plan.  If returning with worse symtoms, could try starting with bactrim or other MRSA-effective antibiotic.    - amoxicillin-clavulanate (AUGMENTIN) 500-125 MG per tablet; Take 1 tablet by mouth 2 times daily  Dispense: 20 tablet; Refill: 0    Jodi Yusuf MD, MPH  Obstetrics and Gynecology     CC: Dr Harding       Total time spent was 35 minutes; greater than 50% of time was spent in counseling and/or coordination of care for the above listed diagnoses.

## 2017-12-07 ENCOUNTER — TELEPHONE (OUTPATIENT)
Dept: FAMILY MEDICINE | Facility: CLINIC | Age: 32
End: 2017-12-07

## 2017-12-07 NOTE — TELEPHONE ENCOUNTER
Reason for call:  Patient reporting a symptom    Symptom or request: left great toe injury    Duration (how long have symptoms been present): this morning 930    Have you been treated for this before? Yes    Additional comments: pt calling to see how long she should wait before she comes in and sees someone about her toe. She jammed it into the couch this morning and felt the nail rip up. She said it was bleeding but now there is just clear fluid coming out. She isn't sure whether or not to make an appt.    Phone Number patient can be reached at:  Home number on file [phone] (home)    Best Time:  any    Can we leave a detailed message on this number:  YES    Call taken on 12/7/2017 at 11:49 AM by Angelia Blanc

## 2017-12-07 NOTE — TELEPHONE ENCOUNTER
S-(situation): left great toenail     B-(background): this morning    A-(assessment): Patient called and states that the left great toenail was stubbed on her couch and half of the nailbed was removed and it is extremely painful and is swollen moderately. Patient states the toe is not blue, is not deformed, but is draining clear and pinkish that just stared now. Patient states she rates it 7/10.     R-(recommendations): Advised to be seen today, but patient declines as her  told her to watch it. Advised to ice it and elevate the foot, may use tylenol or ibuprofen for pain, told the patient to watch for signs of infection or changes like blue or red color, pain worsens, increased swelling, abnormal drainage, and fever, told to call back if patient needs to speak to RN or to schedule appointment. Patient agrees to watch for now and will call back if needed.  LEE ANN Gill

## 2017-12-11 ENCOUNTER — RADIANT APPOINTMENT (OUTPATIENT)
Dept: GENERAL RADIOLOGY | Facility: CLINIC | Age: 32
End: 2017-12-11
Attending: NURSE PRACTITIONER
Payer: COMMERCIAL

## 2017-12-11 ENCOUNTER — OFFICE VISIT (OUTPATIENT)
Dept: FAMILY MEDICINE | Facility: CLINIC | Age: 32
End: 2017-12-11
Payer: COMMERCIAL

## 2017-12-11 VITALS
DIASTOLIC BLOOD PRESSURE: 77 MMHG | RESPIRATION RATE: 16 BRPM | HEART RATE: 82 BPM | OXYGEN SATURATION: 98 % | HEIGHT: 62 IN | TEMPERATURE: 98.3 F | SYSTOLIC BLOOD PRESSURE: 108 MMHG | BODY MASS INDEX: 24.37 KG/M2 | WEIGHT: 132.4 LBS

## 2017-12-11 DIAGNOSIS — S99.922A INJURY OF TOE, LEFT, INITIAL ENCOUNTER: ICD-10-CM

## 2017-12-11 DIAGNOSIS — L08.9 TOE INFECTION: ICD-10-CM

## 2017-12-11 DIAGNOSIS — S99.922A INJURY OF TOE, LEFT, INITIAL ENCOUNTER: Primary | ICD-10-CM

## 2017-12-11 PROCEDURE — 99213 OFFICE O/P EST LOW 20 MIN: CPT | Performed by: NURSE PRACTITIONER

## 2017-12-11 PROCEDURE — 73660 X-RAY EXAM OF TOE(S): CPT | Mod: LT

## 2017-12-11 RX ORDER — CEPHALEXIN 500 MG/1
500 CAPSULE ORAL 2 TIMES DAILY
Qty: 20 CAPSULE | Refills: 0 | Status: SHIPPED | OUTPATIENT
Start: 2017-12-11 | End: 2018-01-11

## 2017-12-11 NOTE — PATIENT INSTRUCTIONS
Paronychia of the Finger or Toe  Paronychia is an infection near a fingernail or toenail. It usually occurs when an opening in the cuticle or an ingrown toenail lets bacteria under the skin.  The infection will need to be drained if pus is present. If the infection has been caught early, you may need only antibiotic treatment. Healing will take about 1 to 2 weeks.  Home care  Follow these guidelines when caring for yourself at home:    Clean and soak the toe or finger. Do this 2 times a day for the first 3 days. To do so:    Soak your foot or hand in a tub of warm water for 5 minutes. Or hold your toe or finger under a faucet of warm running water for 5 minutes.    Clean any crust away with soap and water using a cotton swab.    Put antibiotic ointment on the infected area.    Change the dressing daily or any time it gets dirty.    If you were given antibiotics, take them as directed until they are all gone.    If your infection is on a toe, wear comfortable shoes with a lot of toe room. You can also wear open-toed sandals while your toe heals.    You may use over-the-counter medicine (acetaminophen or ibuprofen to help with pain, unless another medicine was prescribed. If you have chronic liver or kidney disease, talk with your healthcare provider before using these medicines. Also talk with your provider if you've had a stomach ulcer or GI (gastrointestinal) bleeding.  Prevention  The following can prevent paronychia:    Avoid cutting or playing with your cuticles at home.    Don't bite your nails.    Don't suck on your thumbs or fingers.  Follow-up care  Follow up with your healthcare provider, or as advised.  When to seek medical advice  Call your healthcare provider right away if any of these occur:    Redness, pain, or swelling of the finger or toe gets worse    Red streaks in the skin leading away from the wound    Pus or fluid draining from the nail area    Fever of 100.4 F (38 C) or higher, or as directed  by your provider  Date Last Reviewed: 8/1/2016 2000-2017 [employer]. [address] [place] [address], [place] [address]. All rights reserved. This information is not intended as a substitute for professional medical care. Always follow your healthcare professional's instructions.

## 2017-12-11 NOTE — MR AVS SNAPSHOT
After Visit Summary   12/11/2017    Yvette Weiss    MRN: [id number]           Patient Information     Date Of Birth          1985        Visit Information        Provider Department      12/11/2017 1:00 PM Yvette Woods APRN [facility]        Today's Diagnoses     Injury of toe, left, initial encounter    -  1    Toe infection          Care Instructions      Paronychia of the Finger or Toe  Paronychia is an infection near a fingernail or toenail. It usually occurs when an opening in the cuticle or an ingrown toenail lets bacteria under the skin.  The infection will need to be drained if pus is present. If the infection has been caught early, you may need only antibiotic treatment. Healing will take about 1 to 2 weeks.  Home care  Follow these guidelines when caring for yourself at home:    Clean and soak the toe or finger. Do this 2 times a day for the first 3 days. To do so:    Soak your foot or hand in a tub of warm water for 5 minutes. Or hold your toe or finger under a faucet of warm running water for 5 minutes.    Clean any crust away with soap and water using a cotton swab.    Put antibiotic ointment on the infected area.    Change the dressing daily or any time it gets dirty.    If you were given antibiotics, take them as directed until they are all gone.    If your infection is on a toe, wear comfortable shoes with a lot of toe room. You can also wear open-toed sandals while your toe heals.    You may use over-the-counter medicine (acetaminophen or ibuprofen to help with pain, unless another medicine was prescribed. If you have chronic liver or kidney disease, talk with your healthcare provider before using these medicines. Also talk with your provider if you've had a stomach ulcer or GI (gastrointestinal) bleeding.  Prevention  The following can prevent paronychia:    Avoid cutting or playing with your cuticles at home.    Don't bite your nails.    Don't suck  on your thumbs or fingers.  Follow-up care  Follow up with your healthcare provider, or as advised.  When to seek medical advice  Call your healthcare provider right away if any of these occur:    Redness, pain, or swelling of the finger or toe gets worse    Red streaks in the skin leading away from the wound    Pus or fluid draining from the nail area    Fever of 100.4 F (38 C) or higher, or as directed by your provider  Date Last Reviewed: 8/1/2016 2000-2017 [employer]. [address] [place] [address]. All rights reserved. This information is not intended as a substitute for professional medical care. Always follow your healthcare professional's instructions.                Follow-ups after your visit        Who to contact     If you have questions or need follow up information about today's clinic visit or your schedule please contact [facility] directly at [phone].  Normal or non-critical lab and imaging results will be communicated to you by MyChart, letter or phone within 4 business days after the clinic has received the results. If you do not hear from us within 7 days, please contact the clinic through [employer] or phone. If you have a critical or abnormal lab result, we will notify you by phone as soon as possible.  Submit refill requests through [employer] or call your pharmacy and they will forward the refill request to us. Please allow 3 business days for your refill to be completed.          Additional Information About Your Visit        MyChart Information     [employer] gives you secure access to your electronic health record. If you see a primary care provider, you can also send messages to your care team and make appointments. If you have questions, please call your primary care clinic.  If you do not have a primary care provider, please call [phone] and they will assist you.        Care EveryWhere ID     This is your Care EveryWhere ID. This could be used  "by other organizations to access your [facility] medical records  [id number]        Your Vitals Were     Pulse Temperature Respirations Height Pulse Oximetry BMI (Body Mass Index)    82 98.3  F (36.8  C) (Tympanic) 16 5' 2.25\" (1.581 m) 98% 24.02 kg/m2       Blood Pressure from Last 3 Encounters:   12/11/17 108/77   09/28/17 117/65   09/27/17 111/63    Weight from Last 3 Encounters:   12/11/17 132 lb 6.4 oz (60.1 kg)   09/28/17 137 lb 9.6 oz (62.4 kg)   09/27/17 140 lb (63.5 kg)                 Today's Medication Changes          These changes are accurate as of: 12/11/17  1:33 PM.  If you have any questions, ask your nurse or doctor.               Start taking these medicines.        Dose/Directions    cephALEXin 500 MG capsule   Commonly known as:  KEFLEX   Used for:  Toe infection   Started by:  Yvette Woods APRN CNP        Dose:  500 mg   Take 1 capsule (500 mg) by mouth 2 times daily   Quantity:  20 capsule   Refills:  0            Where to get your medicines      These medications were sent to [facility] [place], [place] - [address]  [address] [facility] [address]     Phone:  [phone]     cephALEXin 500 MG capsule                Primary Care Provider    None Specified       No primary provider on file.        Equal Access to Services     BLANCA HERNANDEZ AH: Kwame floreso [place], waaxda luqadaha, qaybta kaalmada juan jose travis. So [facility] [phone].    ATENCIÓN: Si habla español, tiene a nick disposición servicios gratuitos de asistencia lingüística. Jordin bocanegra [phone].    We comply with applicable federal civil rights laws and [place] laws. We do not discriminate on the basis of race, color, national origin, age, disability, sex, sexual orientation, or gender identity.            Thank you!     Thank you for choosing [facility]  for your care. Our goal is always to provide you with excellent care. Hearing back " from our patients is one way we can continue to improve our services. Please take a few minutes to complete the written survey that you may receive in the mail after your visit with us. Thank you!             Your Updated Medication List - Protect others around you: Learn how to safely use, store and throw away your medicines at www.disposemymeds.org.          This list is accurate as of: 12/11/17  1:33 PM.  Always use your most recent med list.                   Brand Name Dispense Instructions for use Diagnosis    cephALEXin 500 MG capsule    KEFLEX    20 capsule    Take 1 capsule (500 mg) by mouth 2 times daily    Toe infection       cyanocobalamin 1000 MCG tablet    vitamin  B-12     Take by mouth daily        FOLIC ACID PO      Take 1 mg by mouth daily        KRILL OIL PO      Take 2 capsules by mouth daily        OMEGA-3 FATTY ACIDS PO      Take 1 capsule by mouth daily        prenatal multivitamin plus iron 27-0.8 MG Tabs per tablet     100 tablet    Take 1 tablet by mouth daily    Positive pregnancy test       VITAMIN D PO      Take 1,000 Units by mouth daily

## 2017-12-11 NOTE — NURSING NOTE
"Chief Complaint   Patient presents with     Toe Pain     stubbed left great toe on couch       Initial /77 (BP Location: Right arm, Patient Position: Sitting, Cuff Size: Adult Regular)  Pulse 82  Temp 98.3  F (36.8  C) (Tympanic)  Resp 16  Ht 5' 2.25\" (1.581 m)  Wt 132 lb 6.4 oz (60.1 kg)  SpO2 98%  BMI 24.02 kg/m2 Estimated body mass index is 24.02 kg/(m^2) as calculated from the following:    Height as of this encounter: 5' 2.25\" (1.581 m).    Weight as of this encounter: 132 lb 6.4 oz (60.1 kg).  Medication Reconciliation: complete  "

## 2017-12-11 NOTE — PROGRESS NOTES
SUBJECTIVE:   Yvette Weiss is a 32 year old female who presents to clinic today for the following health issues:    Pt reported hitting left great toe on five days ago and breaking nail away from nail bed.  Nail remains intact with crack across center (old injury).  Pt reported that she has cleansed toe with soap and water regularly to keep it clean.  She has been wearing loose-fitting shoes for more toe room.  Pt reports throbbing pain in left great toe rated at 1-3/10.  Pt reports increased pain with ambulation.  Pt has taken Aleve, iced, and elevated toe with minimal symptom relief.  Pt reports that toe has increased redness and swelling since injury and has been draining yellow purulent drainage from under the nail.    Toe infection      Duration: 5 days    Description (location/character/radiation): Left great toe, throbbing into left foot    Intensity:  mild    Accompanying signs and symptoms: throbbing with mild pain, yellow drainage, redness, swelling, warmth, nail pulled away from nailbed    History (similar episodes/previous evaluation): toe injury, new nail is currently growing back    Precipitating or alleviating factors: stubbed toe on couch    Therapies tried and outcome: ice and aleve-does get relief          Problem list and histories reviewed & adjusted, as indicated.  Additional history: as documented    Patient Active Problem List   Diagnosis     Lateral epicondylitis     CARDIOVASCULAR SCREENING; LDL GOAL LESS THAN 160     24 hour clinic contact list given     Past Surgical History:   Procedure Laterality Date     ORTHOPEDIC SURGERY  1997    ORIF left wrist       Social History   Substance Use Topics     Smoking status: Former Smoker     Quit date: 1/20/2010     Smokeless tobacco: Never Used     Alcohol use No     Family History   Problem Relation Age of Onset     Adopted: Yes     Alcohol/Drug Mother      A&D     Alcohol/Drug Father      A&D     Parkinsonism Paternal Grandmother      Other -  "See Comments Paternal Grandmother      parkinsons     CEREBROVASCULAR DISEASE Maternal Grandmother      x3     Other - See Comments Sister      fetal alcohol syndrome     CANCER Maternal Grandfather      lung     Other Cancer Paternal Grandfather      Lung         Current Outpatient Prescriptions   Medication Sig Dispense Refill     cephALEXin (KEFLEX) 500 MG capsule Take 1 capsule (500 mg) by mouth 2 times daily 20 capsule 0     cyanocobalamin (VITAMIN  B-12) 1000 MCG tablet Take by mouth daily       FOLIC ACID PO Take 1 mg by mouth daily       OMEGA-3 FATTY ACIDS PO Take 1 capsule by mouth daily        Prenatal Vit-Fe Fumarate-FA (PRENATAL MULTIVITAMIN  PLUS IRON) 27-0.8 MG TABS Take 1 tablet by mouth daily (Patient taking differently: Take 1 tablet by mouth daily ) 100 tablet 0     KRILL OIL PO Take 2 capsules by mouth daily        Cholecalciferol (VITAMIN D PO) Take 1,000 Units by mouth daily        Allergies   Allergen Reactions     Latex Hives     Labs reviewed in EPIC      Reviewed and updated as needed this visit by clinical staffTobacco  Allergies  Med Hx  Surg Hx  Fam Hx  Soc Hx      Reviewed and updated as needed this visit by Provider         ROS:  Constitutional, HEENT, cardiovascular, pulmonary, musculoskeletal, gi and gu systems are negative, except as otherwise noted.      OBJECTIVE:   /77 (BP Location: Right arm, Patient Position: Sitting, Cuff Size: Adult Regular)  Pulse 82  Temp 98.3  F (36.8  C) (Tympanic)  Resp 16  Ht 5' 2.25\" (1.581 m)  Wt 132 lb 6.4 oz (60.1 kg)  SpO2 98%  BMI 24.02 kg/m2  Body mass index is 24.02 kg/(m^2).  GENERAL: healthy, alert and no distress  RESP: lungs clear to auscultation - no rales, rhonchi or wheezes  CV: regular rate and rhythm, normal S1 S2, no S3 or S4, no murmur, click or rub, no peripheral edema and peripheral pulses strong  ABDOMEN: soft, nontender, no hepatosplenomegaly, no masses and bowel sounds normal  MS: no gross musculoskeletal " defects noted, no edema  SKIN: no suspicious lesions or rashes and erythema - left great toe  PSYCH: mentation appears normal, affect normal/bright    Diagnostic Test Results:  Xray - left great toe negative for fracture or osteomyelitis, pending radiology review.    ASSESSMENT/PLAN:         ICD-10-CM    1. Injury of toe, left, initial encounter S99.922A XR Toe Left G/E 2 Views   2. Toe infection L08.9 cephALEXin (KEFLEX) 500 MG capsule       MEDICATIONS:   Orders Placed This Encounter   Medications     cephALEXin (KEFLEX) 500 MG capsule     Sig: Take 1 capsule (500 mg) by mouth 2 times daily     Dispense:  20 capsule     Refill:  0          - Continue other medications without change. Follow up in 1 week for persistent symptoms, sooner for new or worsening symptoms.     Patient Instructions     Paronychia of the Finger or Toe  Paronychia is an infection near a fingernail or toenail. It usually occurs when an opening in the cuticle or an ingrown toenail lets bacteria under the skin.  The infection will need to be drained if pus is present. If the infection has been caught early, you may need only antibiotic treatment. Healing will take about 1 to 2 weeks.  Home care  Follow these guidelines when caring for yourself at home:    Clean and soak the toe or finger. Do this 2 times a day for the first 3 days. To do so:    Soak your foot or hand in a tub of warm water for 5 minutes. Or hold your toe or finger under a faucet of warm running water for 5 minutes.    Clean any crust away with soap and water using a cotton swab.    Put antibiotic ointment on the infected area.    Change the dressing daily or any time it gets dirty.    If you were given antibiotics, take them as directed until they are all gone.    If your infection is on a toe, wear comfortable shoes with a lot of toe room. You can also wear open-toed sandals while your toe heals.    You may use over-the-counter medicine (acetaminophen or ibuprofen to help with  pain, unless another medicine was prescribed. If you have chronic liver or kidney disease, talk with your healthcare provider before using these medicines. Also talk with your provider if you've had a stomach ulcer or GI (gastrointestinal) bleeding.  Prevention  The following can prevent paronychia:    Avoid cutting or playing with your cuticles at home.    Don't bite your nails.    Don't suck on your thumbs or fingers.  Follow-up care  Follow up with your healthcare provider, or as advised.  When to seek medical advice  Call your healthcare provider right away if any of these occur:    Redness, pain, or swelling of the finger or toe gets worse    Red streaks in the skin leading away from the wound    Pus or fluid draining from the nail area    Fever of 100.4 F (38 C) or higher, or as directed by your provider  Date Last Reviewed: 8/1/2016 2000-2017 [employer]. [address] [place] [address]. All rights reserved. This information is not intended as a substitute for professional medical care. Always follow your healthcare professional's instructions.        Please return to clinic with fever, chills, increased pain, increased swelling, or increased redness in left great toe.     JUNE Delgado CNP seen with Palmira Preston, MADDIN, RN, [facility] student      [facility]

## 2017-12-12 NOTE — PROGRESS NOTES
Adriana Crawford    Here is the radiology report from your xray. No abnormalities seen. Please let us know if you have any questions.     Thanks for coming in to the clinic.    JUNE Rodriguez CNP

## 2018-01-11 ENCOUNTER — APPOINTMENT (OUTPATIENT)
Dept: OBGYN | Facility: CLINIC | Age: 33
End: 2018-01-11
Payer: COMMERCIAL

## 2018-01-11 ENCOUNTER — PRENATAL OFFICE VISIT (OUTPATIENT)
Dept: OBGYN | Facility: CLINIC | Age: 33
End: 2018-01-11

## 2018-01-11 DIAGNOSIS — Z34.80 PRENATAL CARE, SUBSEQUENT PREGNANCY: Primary | ICD-10-CM

## 2018-01-11 PROCEDURE — 99207 ZZC NO CHARGE NURSE ONLY: CPT | Performed by: OBSTETRICS & GYNECOLOGY

## 2018-01-11 NOTE — MR AVS SNAPSHOT
After Visit Summary   1/11/2018    Yvette Weiss    MRN: [id number]           Patient Information     Date Of Birth          1985        Visit Information        Provider Department      1/11/2018 12:48 PM Ilsa Wilson MD [facility]        Today's Diagnoses     Prenatal care, subsequent pregnancy    -  1       Follow-ups after your visit        Your next 10 appointments already scheduled     Feb 02, 2018 10:30 AM CST   New Prenatal with Ilsa Wilson MD, [facility] 2   [facility]    [address] [address]   [phone]              Who to contact     If you have questions or need follow up information about today's clinic visit or your schedule please contact [facility] directly at [phone].  Normal or non-critical lab and imaging results will be communicated to you by MyChart, letter or phone within 4 business days after the clinic has received the results. If you do not hear from us within 7 days, please contact the clinic through Kriklehart or phone. If you have a critical or abnormal lab result, we will notify you by phone as soon as possible.  Submit refill requests through [email or web address] or call your pharmacy and they will forward the refill request to us. Please allow 3 business days for your refill to be completed.          Additional Information About Your Visit        MyChart Information     [email or web address] gives you secure access to your electronic health record. If you see a primary care provider, you can also send messages to your care team and make appointments. If you have questions, please call your primary care clinic.  If you do not have a primary care provider, please call [phone] and they will assist you.        Care EveryWhere ID     This is your Care EveryWhere ID. This could be used by other organizations to access your [facility] medical records  [id number]         Your Vitals Were     Last Period                   12/05/2017            Blood Pressure from Last 3 Encounters:   12/11/17 108/77   09/28/17 117/65   09/27/17 111/63    Weight from Last 3 Encounters:   12/11/17 60.1 kg (132 lb 6.4 oz)   09/28/17 62.4 kg (137 lb 9.6 oz)   09/27/17 63.5 kg (140 lb)              Today, you had the following     No orders found for display       Primary Care Provider    None Specified       No primary provider on file.        Equal Access to Services     BLANCA HERNANDEZ : Hadii ruba christian [place], wabernieda luqadaha, qaybta kaalmabradley travis, juan jose camejo . So [facility] [phone].    ATENCIÓN: Si habla español, tiene a nick disposición servicios gratuitos de asistencia lingüística. Llame al [phone].    We comply with applicable federal civil rights laws and [place] laws. We do not discriminate on the basis of race, color, national origin, age, disability, sex, sexual orientation, or gender identity.            Thank you!     Thank you for choosing [facility]  for your care. Our goal is always to provide you with excellent care. Hearing back from our patients is one way we can continue to improve our services. Please take a few minutes to complete the written survey that you may receive in the mail after your visit with us. Thank you!             Your Updated Medication List - Protect others around you: Learn how to safely use, store and throw away your medicines at www.disposemymeds.org.          This list is accurate as of: 1/11/18 12:59 PM.  Always use your most recent med list.                   Brand Name Dispense Instructions for use Diagnosis    cyanocobalamin 1000 MCG tablet    vitamin  B-12     Take by mouth daily        FOLIC ACID PO      Take 1 mg by mouth daily        KRILL OIL PO      Take 2 capsules by mouth daily        OMEGA-3 FATTY ACIDS PO      Take 1 capsule by mouth daily        prenatal multivitamin plus iron 27-0.8 MG  Tabs per tablet     100 tablet    Take 1 tablet by mouth daily    Positive pregnancy test       VITAMIN D PO      Take 1,000 Units by mouth daily

## 2018-01-29 ENCOUNTER — TELEPHONE (OUTPATIENT)
Dept: OBGYN | Facility: CLINIC | Age: 33
End: 2018-01-29

## 2018-01-29 DIAGNOSIS — O21.0 HYPEREMESIS GRAVIDARUM: Primary | ICD-10-CM

## 2018-01-29 RX ORDER — ONDANSETRON 4 MG/1
4-8 TABLET, ORALLY DISINTEGRATING ORAL EVERY 8 HOURS PRN
Qty: 20 TABLET | Status: SHIPPED | OUTPATIENT
Start: 2018-01-29 | End: 2018-04-26

## 2018-01-29 NOTE — TELEPHONE ENCOUNTER
Reason for call:  Patient reporting a symptom    Symptom or request: pregnant - not keeping any food down at all for the last two weeks.  Every time she stands up to move she is hit with a wave of nausea.  Can't take vitamins because they just come right back up - can't even eat crackers.  3rd pregnancy.  About 8 weeks.    Duration (how long have symptoms been present): 2 weeks    Have you been treated for this before? No    Additional comments: pharmacy: [facility]    Phone Number patient can be reached at:  Home number on file [phone] (home)    Best Time:  any    Can we leave a detailed message on this number:  YES    Call taken on 1/29/2018 at 9:02 AM by Anya Lombardi

## 2018-01-29 NOTE — TELEPHONE ENCOUNTER
"Return call to patient.  Spoke with patient on the phone.    S-(situation): nausea and vomiting for about 2 weeks. Patient reports she is not able to keep down any food but is able to drink water. Patient reports she is occasionally dizzy and lightheaded with quick movements. Patient caring for other 2 children at home. Patient reports urine is light yellow and she is voiding \"on average, every couple of hours.\" Patient reports normal bowel movement this morning, easy to pass, not diarrhea. Patient believes she has lost about 4 pounds. Patient taking ginger capsules, although she is not sure how much is staying in her system. Patient reports trying to take Vit B vitamins but not sure how much she is retaining. Patient reports with her other two children she did not have this much nausea or vomiting.    B-(background):  at 7W6D pregnant, 1st OB appointment 17    A-(assessment): nausea and vomiting in early pregnancy    R-(recommendations): Reassurance provided. Reviewed signs and symptoms of dehydration and when to be seen. Reviewed Vitamin B6 and Unisom for nausea - patient feels unlikely she will keep these down. Reviewed dissolvable Zofran - patient willing to try. Reviewed strategies for oral intake, recommend ginger ale, powerade, gatorade, Ensure, [place] instant breakfast shakes, smoothies.    Will send to provider for consideration for prescription.    Lou Finnegan   [facility]  RN      "

## 2018-02-02 ENCOUNTER — PRENATAL OFFICE VISIT (OUTPATIENT)
Dept: OBGYN | Facility: CLINIC | Age: 33
End: 2018-02-02
Payer: COMMERCIAL

## 2018-02-02 VITALS
DIASTOLIC BLOOD PRESSURE: 73 MMHG | HEIGHT: 62 IN | BODY MASS INDEX: 23.74 KG/M2 | WEIGHT: 129 LBS | RESPIRATION RATE: 16 BRPM | SYSTOLIC BLOOD PRESSURE: 115 MMHG | HEART RATE: 85 BPM | TEMPERATURE: 97.4 F

## 2018-02-02 DIAGNOSIS — Z34.81 ENCOUNTER FOR SUPERVISION OF OTHER NORMAL PREGNANCY, FIRST TRIMESTER: Primary | ICD-10-CM

## 2018-02-02 LAB
ABO + RH BLD: NORMAL
ABO + RH BLD: NORMAL
BLD GP AB SCN SERPL QL: NORMAL
BLOOD BANK CMNT PATIENT-IMP: NORMAL
ERYTHROCYTE [DISTWIDTH] IN BLOOD BY AUTOMATED COUNT: 12.4 % (ref 10–15)
HCT VFR BLD AUTO: 40.1 % (ref 35–47)
HGB BLD-MCNC: 13.4 G/DL (ref 11.7–15.7)
MCH RBC QN AUTO: 30.8 PG (ref 26.5–33)
MCHC RBC AUTO-ENTMCNC: 33.4 G/DL (ref 31.5–36.5)
MCV RBC AUTO: 92 FL (ref 78–100)
PLATELET # BLD AUTO: 182 10E9/L (ref 150–450)
RBC # BLD AUTO: 4.35 10E12/L (ref 3.8–5.2)
SPECIMEN EXP DATE BLD: NORMAL
WBC # BLD AUTO: 7.6 10E9/L (ref 4–11)

## 2018-02-02 PROCEDURE — 86762 RUBELLA ANTIBODY: CPT | Performed by: OBSTETRICS & GYNECOLOGY

## 2018-02-02 PROCEDURE — 76817 TRANSVAGINAL US OBSTETRIC: CPT | Performed by: OBSTETRICS & GYNECOLOGY

## 2018-02-02 PROCEDURE — 99207 ZZC FIRST OB VISIT: CPT | Performed by: OBSTETRICS & GYNECOLOGY

## 2018-02-02 PROCEDURE — 36415 COLL VENOUS BLD VENIPUNCTURE: CPT | Performed by: OBSTETRICS & GYNECOLOGY

## 2018-02-02 PROCEDURE — 87340 HEPATITIS B SURFACE AG IA: CPT | Performed by: OBSTETRICS & GYNECOLOGY

## 2018-02-02 PROCEDURE — 85027 COMPLETE CBC AUTOMATED: CPT | Performed by: OBSTETRICS & GYNECOLOGY

## 2018-02-02 PROCEDURE — 87389 HIV-1 AG W/HIV-1&-2 AB AG IA: CPT | Performed by: OBSTETRICS & GYNECOLOGY

## 2018-02-02 PROCEDURE — 86901 BLOOD TYPING SEROLOGIC RH(D): CPT | Performed by: OBSTETRICS & GYNECOLOGY

## 2018-02-02 PROCEDURE — 86900 BLOOD TYPING SEROLOGIC ABO: CPT | Performed by: OBSTETRICS & GYNECOLOGY

## 2018-02-02 PROCEDURE — 86780 TREPONEMA PALLIDUM: CPT | Performed by: OBSTETRICS & GYNECOLOGY

## 2018-02-02 PROCEDURE — 86850 RBC ANTIBODY SCREEN: CPT | Performed by: OBSTETRICS & GYNECOLOGY

## 2018-02-02 NOTE — MR AVS SNAPSHOT
After Visit Summary   2/2/2018    Yvette Weiss    MRN: [id number]           Patient Information     Date Of Birth          1985        Visit Information        Provider Department      2/2/2018 10:30 AM Ilsa Wilson MD; [facility]        Today's Diagnoses     Encounter for supervision of other normal pregnancy, first trimester    -  1       Follow-ups after your visit        Additional Services     MAT FETAL MED CTR REFERRAL-PREGNANCY       >> Patient may proceed with recommendations for further testing as directed by the Maternal Fetal Medicine Specialist >>    >> If requesting Fetal Echo: MFM will determine appropriate location for exam due to indication.    >> If requesting Lung Maturity Amnio:  If results indicate fetal lung maturity, induction or C/S is recommended within 36 hours.  Please schedule accordingly.     Dear Patient:   Please be aware that coverage of these services is subject to the terms and limitations of your health insurance plan.  Call member services at your health plan with any benefit or coverage questions.      Please bring the following to your appointment:    >>  Any x-rays, CTs or MRIs which have been performed.  Contact the facility where they were done to arrange for  prior to your scheduled appointment.  Any new CT, MRI or other procedures ordered by your specialist must be performed at a [facility] facility or coordinated by your clinic's referral office.  >>  List of current medications   >>  This referral request   >>  Any documents/labs given to you for this referral                  Your next 10 appointments already scheduled     Mar 02, 2018  2:00 PM CST   ESTABLISHED PRENATAL with Jodi Yusuf MD   [facility]    [address] [address] [phone]              Who to contact     If you have questions or need follow up information about  "today's clinic visit or your schedule please contact [facility] directly at [phone].  Normal or non-critical lab and imaging results will be communicated to you by Talkwheelhart, letter or phone within 4 business days after the clinic has received the results. If you do not hear from us within 7 days, please contact the clinic through Talkwheelhart or phone. If you have a critical or abnormal lab result, we will notify you by phone as soon as possible.  Submit refill requests through Foodfly or call your pharmacy and they will forward the refill request to us. Please allow 3 business days for your refill to be completed.          Additional Information About Your Visit        TalkwheelharINFUSD Information     Foodfly gives you secure access to your electronic health record. If you see a primary care provider, you can also send messages to your care team and make appointments. If you have questions, please call your primary care clinic.  If you do not have a primary care provider, please call [phone] and they will assist you.        Care EveryWhere ID     This is your Care EveryWhere ID. This could be used by other organizations to access your [place] medical records  [id number]        Your Vitals Were     Pulse Temperature Respirations Height Last Period BMI (Body Mass Index)    85 97.4  F (36.3  C) (Tympanic) 16 5' 2.25\" (1.581 m) 12/05/2017 23.41 kg/m2       Blood Pressure from Last 3 Encounters:   02/02/18 115/73   12/11/17 108/77   09/28/17 117/65    Weight from Last 3 Encounters:   02/02/18 129 lb (58.5 kg)   12/11/17 132 lb 6.4 oz (60.1 kg)   09/28/17 137 lb 9.6 oz (62.4 kg)              We Performed the Following     *UA reflex to Microscopic     ABO/Rh type and screen     Anti Treponema     CBC with platelets     Hepatitis B surface antigen     HIV Antigen Antibody Combo     MAT FETAL MED CTR REFERRAL-PREGNANCY     Rubella Antibody IgG Quantitative     Urine Culture Aerobic Bacterial     US OB <14 Weeks " w Transvaginal Single        Primary Care Provider Fax #    Physician No Ref-Primary [phone]       No address on file        Equal Access to Services     BLANCA HERNANDEZ : Hadii aad ku hadpriyankpatricia Deo, bozena frandymegan, wilmer travis, juan jose gonzales So [facility] [place] [phone].    ATENCIÓN: Si habla español, tiene a nick disposición servicios gratuitos de asistencia lingüística. Llame al [phone].    We comply with applicable federal civil rights laws and [place] laws. We do not discriminate on the basis of race, color, national origin, age, disability, sex, sexual orientation, or gender identity.            Thank you!     Thank you for choosing [facility]  for your care. Our goal is always to provide you with excellent care. Hearing back from our patients is one way we can continue to improve our services. Please take a few minutes to complete the written survey that you may receive in the mail after your visit with us. Thank you!             Your Updated Medication List - Protect others around you: Learn how to safely use, store and throw away your medicines at www.disposemymeds.org.          This list is accurate as of 2/2/18 11:47 AM.  Always use your most recent med list.                   Brand Name Dispense Instructions for use Diagnosis    cyanocobalamin 1000 MCG tablet    vitamin  B-12     Take by mouth daily        FOLIC ACID PO      Take 1 mg by mouth daily        KRILL OIL PO      Take 2 capsules by mouth daily        OMEGA-3 FATTY ACIDS PO      Take 1 capsule by mouth daily        ondansetron 4 MG ODT tab    ZOFRAN ODT    20 tablet    Take 1-2 tablets (4-8 mg) by mouth every 8 hours as needed for nausea    Hyperemesis gravidarum       prenatal multivitamin plus iron 27-0.8 MG Tabs per tablet     100 tablet    Take 1 tablet by mouth daily    Positive pregnancy test       VITAMIN D PO      Take 1,000 Units by mouth daily

## 2018-02-02 NOTE — PROGRESS NOTES
Discussed physician coverage, tertiary support, diet, exercise, weight gain, schedule of visits, routine and indicated ultrasounds, childbirth education and antepartum testing for certain birth defects.  Encouraged patient to review contents of Prenatal Breastfeeding Education Toolkit. Offered opportunity to answer questions regarding the importance of skin to skin contact, early initiation of exclusive breastfeeding for the first six months and rooming in while in the hospital.  Discussed [facility] travel advisory for Zika virus.  Syphilis is a sexually transmitted disease that can cause birth defects in the babies of untreated mothers. Every pregnant patient is tested for syphilis early in each pregnancy as part of the routine lab work. The [place] Department of Health has seen an increase in the rate of syphilis in [place]. The [facility] now recommends testing for syphilis 3 times during a pregnancy, the new prenatal visit, 28 weeks and when admitted for delivery. Patient accepts lab testing for syphilis.        Options for  testing for birth defects were discussed with the patient, generally including CVS testing, Quad screen serum testing, nuchal lucency/blood marker testing, genetic amniocentesis, Verifi testing  and/or level 2 ultrasound. Pt desires 1st trimester screen    I discussed at length with her, her prior h/o GDM and large baby.  She questions the validity of the testing and strongly declines doing GDM screening this pregnancy; she does, however, agree to invivo BS testing fasting and 2 hr PP; I discussed our goals and potential need for medical therapy if levels are elevated.    Current issues include: nausea, moderate    Past medical, surgical, social and family histories reviewed on OB questionaire and included on episode summary.  Pertinent review of systems items stated above. See OB questionaire for pertinent components of HPI.    Past Medical History:   Diagnosis Date     Anxiety     as  "teenager     ASCUS on Pap smear 2011    neg for High Risk HPV     Chickenpox     x2     Depression     as teenager     Gestational diabetes 2016     Postpartum depression 4/2014    mild     See epic chart    Past Surgical History:   Procedure Laterality Date     ORTHOPEDIC SURGERY  1997    ORIF left wrist     See epic chart    Patient Active Problem List    Diagnosis Date Noted     Encounter for supervision of other normal pregnancy, first trimester 05/18/2015     Priority: Medium     H/o GDM with 9lb 12 oz baby  H/o PP depression  Pt declines diabetes testing but is willing to do FBS and 2 HR PP BS assessments through pregnancy           CARDIOVASCULAR SCREENING; LDL GOAL LESS THAN 160 02/09/2012     Priority: Medium     24 hour clinic contact list given 02/09/2012     Priority: Medium     EMERGENCY CARE PLAN  Presenting Problem Signs and Symptoms Treatment Plan    Questions or conerns during clinic hours    I will call the clinic directly     Questions or conerns outside clinic hours    I will call the 24 hour nurse line at [phone]    Patient needs to schedule an appointment    I will call the 24 hour scheduling team at [phone] or clinic directly    Same day treatment     I will call the clinic first, nurse line if after hours, urgent care and express care if needed                               Lateral epicondylitis 09/14/2004     Priority: Medium       OBJECTIVE: /73 (BP Location: Right arm, Patient Position: Chair, Cuff Size: Adult Small)  Pulse 85  Temp 97.4  F (36.3  C) (Tympanic)  Resp 16  Ht 5' 2.25\" (1.581 m)  Wt 129 lb (58.5 kg)  LMP 12/05/2017  BMI 23.41 kg/m2    GENERAL APPEARANCE: healthy, alert and no distress  ABDOMEN:  soft, nontender, no hepato-splenomegaly or hernias  PELVIC:  EGBUS:  within normal limits  VAGINA:  normoestrogenic, well-supported, no unusual discharge  CERVIX:  smooth, non-friable, no gross lesions, thin-layer PAP was not taken  UTERUS:  anteverted and " enlarged, 8 weeks size  ADNEXAE:  non-tender, no masses palpable, no cul de sac nodularity     NECK: no adenopathy, no asymmetry, masses, or scars and thyroid normal to palpation     RESP: lungs clear to auscultation , respiratory effort WNL     CV: regular rates and rhythm, normal S1 S2, no S3 or S4 and no murmur, click or rub -     SKIN: no suspicious lesions or rashes     PSYCH: mentation appears normal. and affect normal/bright, oriented to person/place/time     LYMPHATICS: No axillary, cervical, inguinal, or supraclavicular nodes    Transvaginal ultrasound was performed. A live single intrauterine pregnancy was seen.  CRL=1.74 cm, c/w 8 weeks, 3 days.  EDC by sono =9/11/18    Fetal heart motion was visualized. FHR= 178 bpm            ASSESSMENT:    ICD-10-CM    1. Encounter for supervision of other normal pregnancy, first trimester Z34.81 CBC with platelets     ABO/Rh type and screen     Hepatitis B surface antigen     Rubella Antibody IgG Quantitative     Urine Culture Aerobic Bacterial     *UA reflex to Microscopic     Anti Treponema     HIV Antigen Antibody Combo     US OB <14 Weeks w Transvaginal Single         PLAN: see orders and OB problem list annotations    Pt does not want to do GDM screening testing and is willing to go right to FBS/2hrPP testing in vivo; goals reviewed with pt.    Ilsa Wilson

## 2018-02-03 LAB — T PALLIDUM IGG+IGM SER QL: NEGATIVE

## 2018-02-05 LAB
HBV SURFACE AG SERPL QL IA: NONREACTIVE
HIV 1+2 AB+HIV1 P24 AG SERPL QL IA: NONREACTIVE
RUBV IGG SERPL IA-ACNC: 32 IU/ML

## 2018-03-02 ENCOUNTER — PRENATAL OFFICE VISIT (OUTPATIENT)
Dept: OBGYN | Facility: CLINIC | Age: 33
End: 2018-03-02
Payer: COMMERCIAL

## 2018-03-02 VITALS
HEART RATE: 78 BPM | DIASTOLIC BLOOD PRESSURE: 64 MMHG | RESPIRATION RATE: 16 BRPM | SYSTOLIC BLOOD PRESSURE: 109 MMHG | TEMPERATURE: 98.5 F | HEIGHT: 62 IN | BODY MASS INDEX: 24.37 KG/M2 | WEIGHT: 132.4 LBS

## 2018-03-02 DIAGNOSIS — Z34.81 ENCOUNTER FOR SUPERVISION OF OTHER NORMAL PREGNANCY, FIRST TRIMESTER: Primary | ICD-10-CM

## 2018-03-02 PROCEDURE — 99207 ZZC PRENATAL VISIT: CPT | Performed by: OBSTETRICS & GYNECOLOGY

## 2018-03-02 NOTE — NURSING NOTE
"Chief Complaint   Patient presents with     Prenatal Care       Initial /64 (BP Location: Right arm, Patient Position: Sitting, Cuff Size: Adult Regular)  Pulse 78  Temp 98.5  F (36.9  C) (Tympanic)  Resp 16  Ht 5' 2.25\" (1.581 m)  Wt 132 lb 6.4 oz (60.1 kg)  LMP 12/05/2017  Breastfeeding? No  BMI 24.02 kg/m2 Estimated body mass index is 24.02 kg/(m^2) as calculated from the following:    Height as of this encounter: 5' 2.25\" (1.581 m).    Weight as of this encounter: 132 lb 6.4 oz (60.1 kg).  Medication Reconciliation: complete   Suze Romero CMA      "

## 2018-03-02 NOTE — MR AVS SNAPSHOT
After Visit Summary   3/2/2018    Yvette Weiss    MRN: [id number]           Patient Information     Date Of Birth          1985        Visit Information        Provider Department      3/2/2018 2:00 PM Jodi Yusuf MD [facility]        Today's Diagnoses     Encounter for supervision of other normal pregnancy, first trimester    -  1       Follow-ups after your visit        Follow-up notes from your care team     Return in about 4 weeks (around 3/30/2018).      Your next 10 appointments already scheduled     Mar 09, 2018  2:15 PM CST   Genetic Counseling with ADRIEL GEN COUNSELOR 1   [facility] Maternal Fetal Medicine - [facility])    [address]  [facility] [address]   [phone]            Mar 09, 2018  3:00 PM CST   (Arrive by 2:45 PM)   MFATIYA NUCHAL TRANSLUCENCY with [email or web address]   [facility] Maternal Fetal Medicine Ultrasound - [facility])    [address]  [facility] [address]   [phone]            Mar 09, 2018  3:30 PM CST   Radiology MD with ADRIEL BERGER MD   [facility] Maternal Fetal Medicine - [facility])    [address]  [facility] [address]   [phone]           Please arrive at the time given for your first appointment. This visit is used internally to schedule the physician's time during your ultrasound.            Mar 29, 2018  2:30 PM CDT   ESTABLISHED PRENATAL with Kristel Jolly MD   [facility] ([facility])    [address] [address]   [phone]            Apr 26, 2018  1:00 PM CDT   ESTABLISHED PRENATAL with Ilsa Wilson MD   [facility] ([facility])    [address] [address]   [phone]              Who to contact     If you have questions or need follow up information about  "today's clinic visit or your schedule please contact [facility] directly at [phone].  Normal or non-critical lab and imaging results will be communicated to you by beqomhart, letter or phone within 4 business days after the clinic has received the results. If you do not hear from us within 7 days, please contact the clinic through beqomhart or phone. If you have a critical or abnormal lab result, we will notify you by phone as soon as possible.  Submit refill requests through Tactus Technology or call your pharmacy and they will forward the refill request to us. Please allow 3 business days for your refill to be completed.          Additional Information About Your Visit        beqomhart Information     T[employer] gives you secure access to your electronic health record. If you see a primary care provider, you can also send messages to your care team and make appointments. If you have questions, please call your primary care clinic.  If you do not have a primary care provider, please call [phone] and they will assist you.        Care EveryWhere ID     This is your Care EveryWhere ID. This could be used by other organizations to access your [place] medical records  [id number]        Your Vitals Were     Pulse Temperature Respirations Height Last Period Breastfeeding?    78 98.5  F (36.9  C) (Tympanic) 16 1.581 m (5' 2.25\") 12/05/2017 No    BMI (Body Mass Index)                   24.02 kg/m2            Blood Pressure from Last 3 Encounters:   03/02/18 109/64   02/02/18 115/73   12/11/17 108/77    Weight from Last 3 Encounters:   03/02/18 60.1 kg (132 lb 6.4 oz)   02/02/18 58.5 kg (129 lb)   12/11/17 60.1 kg (132 lb 6.4 oz)              Today, you had the following     No orders found for display       Primary Care Provider Fax #    Physician No Ref-Primary [phone]       No address on file        Equal Access to Services     BLANCA HERNANDEZ : Kwame Desouza, bozena brock, wilmer escoto " juan jose travispranayjonna quinteroAnabellaaan ah. So [facility] [phone].    ATENCIÓN: Si habla nicholas, tiene a nick disposición servicios gratuitos de asistencia lingüística. Jordin al [phone].    We comply with applicable federal civil rights laws and [place] laws. We do not discriminate on the basis of race, color, national origin, age, disability, sex, sexual orientation, or gender identity.            Thank you!     Thank you for choosing [facility]  for your care. Our goal is always to provide you with excellent care. Hearing back from our patients is one way we can continue to improve our services. Please take a few minutes to complete the written survey that you may receive in the mail after your visit with us. Thank you!             Your Updated Medication List - Protect others around you: Learn how to safely use, store and throw away your medicines at www.disposemymeds.org.          This list is accurate as of 3/2/18  7:18 PM.  Always use your most recent med list.                   Brand Name Dispense Instructions for use Diagnosis    cyanocobalamin 1000 MCG tablet    vitamin  B-12     Take by mouth daily        FOLIC ACID PO      Take 1 mg by mouth daily        KRILL OIL PO      Take 2 capsules by mouth daily        OMEGA-3 FATTY ACIDS PO      Take 1 capsule by mouth daily        ondansetron 4 MG ODT tab    ZOFRAN ODT    20 tablet    Take 1-2 tablets (4-8 mg) by mouth every 8 hours as needed for nausea    Hyperemesis gravidarum       prenatal multivitamin plus iron 27-0.8 MG Tabs per tablet     100 tablet    Take 1 tablet by mouth daily    Positive pregnancy test       VITAMIN D PO      Take 1,000 Units by mouth daily

## 2018-03-03 NOTE — PROGRESS NOTES
Having some sciatic pain on the right side, has seen chiropracter and PT.  No FM yet.  No VB.    33 year old  at 12w3d   - discussed quickening  - discussed palliative measures for sciatic pain    RTC 4 weeks    Jodi Yusuf MD, MPH  [facility]

## 2018-03-07 ENCOUNTER — PRE VISIT (OUTPATIENT)
Dept: MATERNAL FETAL MEDICINE | Facility: CLINIC | Age: 33
End: 2018-03-07

## 2018-03-09 ENCOUNTER — OFFICE VISIT (OUTPATIENT)
Dept: MATERNAL FETAL MEDICINE | Facility: CLINIC | Age: 33
End: 2018-03-09
Attending: OBSTETRICS & GYNECOLOGY
Payer: COMMERCIAL

## 2018-03-09 ENCOUNTER — HOSPITAL ENCOUNTER (OUTPATIENT)
Dept: ULTRASOUND IMAGING | Facility: CLINIC | Age: 33
Discharge: HOME | End: 2018-03-09
Attending: OBSTETRICS & GYNECOLOGY | Admitting: OBSTETRICS & GYNECOLOGY
Payer: COMMERCIAL

## 2018-03-09 DIAGNOSIS — Z36.9 FIRST TRIMESTER SCREENING: Primary | ICD-10-CM

## 2018-03-09 DIAGNOSIS — O26.90 PREGNANCY RELATED CONDITION, ANTEPARTUM: ICD-10-CM

## 2018-03-09 PROCEDURE — 36415 COLL VENOUS BLD VENIPUNCTURE: CPT | Performed by: OBSTETRICS & GYNECOLOGY

## 2018-03-09 PROCEDURE — 96040 ZZH GENETIC COUNSELING, EACH 30 MINUTES: CPT | Mod: ZF | Performed by: GENETIC COUNSELOR, MS

## 2018-03-09 PROCEDURE — 76813 OB US NUCHAL MEAS 1 GEST: CPT

## 2018-03-09 PROCEDURE — 84163 PAPPA SERUM: CPT | Performed by: OBSTETRICS & GYNECOLOGY

## 2018-03-09 PROCEDURE — 84704 HCG FREE BETACHAIN TEST: CPT | Performed by: OBSTETRICS & GYNECOLOGY

## 2018-03-09 NOTE — PROGRESS NOTES
"Please see \"Imaging\" tab under \"Chart Review\" for details of today's US at the [facility].    Wesly Durbin MD  Maternal-Fetal Medicine      "

## 2018-03-09 NOTE — MR AVS SNAPSHOT
After Visit Summary   3/9/2018    Yvette Weiss    MRN: [id number]           Patient Information     Date Of Birth          1985        Visit Information        Provider Department      3/9/2018 3:30 PM Wesly Durbin MD [facility] Maternal Fetal Medicine [facility]        Today's Diagnoses     First trimester screening    -  1       Follow-ups after your visit        Your next 10 appointments already scheduled     Mar 09, 2018  3:30 PM CST   Radiology MD with Wesly Durbin MD   [facility] Maternal Fetal Medicine [facility] ([facility])    [address]  [facility] [place] [address]   [phone]           Please arrive at the time given for your first appointment. This visit is used internally to schedule the physician's time during your ultrasound.            Mar 29, 2018  2:30 PM CDT   ESTABLISHED PRENATAL with Kristel Jolly MD   [facility]    [address] [address]   [phone]            Apr 26, 2018  1:00 PM CDT   ESTABLISHED PRENATAL with Ilsa Wilson MD   [facility]    [address] [address]   [phone]              Future tests that were ordered for you today     Open Future Orders        Priority Expected Expires Ordered    First Trimester Screen Biochem Markers Routine  6/7/2018 3/9/2018            Who to contact     If you have questions or need follow up information about today's clinic visit or your schedule please contact [facility] MATERNAL FETAL MEDICINE [facility] directly at [phone].  Normal or non-critical lab and imaging results will be communicated to you by MyChart, letter or phone within 4 business days after the clinic has received the results. If you do not hear from us within 7 days, please contact the clinic through MyChart or phone. If you have a critical or abnormal lab  result, we will notify you by phone as soon as possible.  Submit refill requests through [employer] or call your pharmacy and they will forward the refill request to us. Please allow 3 business days for your refill to be completed.          Additional Information About Your Visit        [employer] Information     [employer] gives you secure access to your electronic health record. If you see a primary care provider, you can also send messages to your care team and make appointments. If you have questions, please call your primary care clinic.  If you do not have a primary care provider, please call [phone] and they will assist you.        Care EveryWhere ID     This is your Care EveryWhere ID. This could be used by other organizations to access your [facility] medical records  [id number]        Your Vitals Were     Last Period                   12/05/2017            Blood Pressure from Last 3 Encounters:   03/02/18 109/64   02/02/18 115/73   12/11/17 108/77    Weight from Last 3 Encounters:   03/02/18 60.1 kg (132 lb 6.4 oz)   02/02/18 58.5 kg (129 lb)   12/11/17 60.1 kg (132 lb 6.4 oz)              Today, you had the following     No orders found for display       Primary Care Provider Fax #    Physician No Ref-Primary [phone]       No address on file        Equal Access to Services     BLANCA HERNANDEZ : Hadii aad ku hadasho Soomaali, waaxda luqadaha, qaybta kaalmada adeegyada, juan jose camejo . So [facility] [phone].    ATENCIÓN: Si habla español, tiene a nick disposición servicios gratuitos de asistencia lingüística. Llame al [phone].    We comply with applicable federal civil rights laws and [place] laws. We do not discriminate on the basis of race, color, national origin, age, disability, sex, sexual orientation, or gender identity.            Thank you!     Thank you for choosing [facility]  for your care. Our goal is always to provide you with  excellent care. Hearing back from our patients is one way we can continue to improve our services. Please take a few minutes to complete the written survey that you may receive in the mail after your visit with us. Thank you!             Your Updated Medication List - Protect others around you: Learn how to safely use, store and throw away your medicines at www.disposemymeds.org.          This list is accurate as of 3/9/18  3:22 PM.  Always use your most recent med list.                   Brand Name Dispense Instructions for use Diagnosis    cyanocobalamin 1000 MCG tablet    vitamin  B-12     Take by mouth daily        FOLIC ACID PO      Take 1 mg by mouth daily        KRILL OIL PO      Take 2 capsules by mouth daily        OMEGA-3 FATTY ACIDS PO      Take 1 capsule by mouth daily        ondansetron 4 MG ODT tab    ZOFRAN ODT    20 tablet    Take 1-2 tablets (4-8 mg) by mouth every 8 hours as needed for nausea    Hyperemesis gravidarum       prenatal multivitamin plus iron 27-0.8 MG Tabs per tablet     100 tablet    Take 1 tablet by mouth daily    Positive pregnancy test       VITAMIN D PO      Take 1,000 Units by mouth daily

## 2018-03-09 NOTE — MR AVS SNAPSHOT
After Visit Summary   3/9/2018    Yvette Weiss    MRN: [id number]           Patient Information     Date Of Birth          1985        Visit Information        Provider Department      3/9/2018 2:15 PM Renu Hillman GC [facility] Maternal Fetal Medicine [facility]        Today's Diagnoses     First trimester screening    -  1    Pregnancy related condition, antepartum           Follow-ups after your visit        Your next 10 appointments already scheduled     Mar 29, 2018  2:30 PM CDT   ESTABLISHED PRENATAL with Kristel Jolly MD   [facility]    [address] [address]   [phone]            Apr 26, 2018  1:00 PM CDT   ESTABLISHED PRENATAL with Ilsa Wilson MD   [facility]    [address] [address]   [phone]              Who to contact     If you have questions or need follow up information about today's clinic visit or your schedule please contact [facility]ERNAL FETAL MED[facility] directly at [phone].  Normal or non-critical lab and imaging results will be communicated to you by "[employer]hart, letter or phone within 4 business days after the clinic has received the results. If you do not hear from us within 7 days, please contact the clinic through [employer] or phone. If you have a critical or abnormal lab result, we will notify you by phone as soon as possible.  Submit refill requests through [employer] or call your pharmacy and they will forward the refill request to us. Please allow 3 business days for your refill to be completed.          Additional Information About Your Visit        MyChart Information     [employer] gives you secure access to your electronic health record. If you see a primary care provider, you can also send messages to your care team and make appointments. If you have questions, please call your primary  care clinic.  If you do not have a primary care provider, please call [phone] and they will assist you.        Care EveryWhere ID     This is your Care EveryWhere ID. This could be used by other organizations to access your [facility] medical records  [id number]        Your Vitals Were     Last Period                   12/05/2017            Blood Pressure from Last 3 Encounters:   03/02/18 109/64   02/02/18 115/73   12/11/17 108/77    Weight from Last 3 Encounters:   03/02/18 60.1 kg (132 lb 6.4 oz)   02/02/18 58.5 kg (129 lb)   12/11/17 60.1 kg (132 lb 6.4 oz)              We Performed the Following     First Trimester Screen Biochem Markers     MFM Genetic Counseling        Primary Care Provider Fax #    Physician No Ref-Primary [phone]       No address on file        Equal Access to Services     BLANCA HERNANDEZ : Kwame Desouza, bozena brock, wilmer travis, juan jose camejo . So [facility] [phone].    ATENCIÓN: Si habla español, tiene a nick disposición servicios gratuitos de asistencia lingüística. Llame al [phone].    We comply with applicable federal civil rights laws and [place] laws. We do not discriminate on the basis of race, color, national origin, age, disability, sex, sexual orientation, or gender identity.            Thank you!     Thank you for choosing [facility]  for your care. Our goal is always to provide you with excellent care. Hearing back from our patients is one way we can continue to improve our services. Please take a few minutes to complete the written survey that you may receive in the mail after your visit with us. Thank you!             Your Updated Medication List - Protect others around you: Learn how to safely use, store and throw away your medicines at www.disposemymeds.org.          This list is accurate as of 3/9/18 11:59 PM.  Always use your most recent med list.                    Brand Name Dispense Instructions for use Diagnosis    cyanocobalamin 1000 MCG tablet    vitamin  B-12     Take by mouth daily        FOLIC ACID PO      Take 1 mg by mouth daily        KRILL OIL PO      Take 2 capsules by mouth daily        OMEGA-3 FATTY ACIDS PO      Take 1 capsule by mouth daily        ondansetron 4 MG ODT tab    ZOFRAN ODT    20 tablet    Take 1-2 tablets (4-8 mg) by mouth every 8 hours as needed for nausea    Hyperemesis gravidarum       prenatal multivitamin plus iron 27-0.8 MG Tabs per tablet     100 tablet    Take 1 tablet by mouth daily    Positive pregnancy test       VITAMIN D PO      Take 1,000 Units by mouth daily

## 2018-03-10 NOTE — PROGRESS NOTES
[facility] Fetal Medicine [place]  Genetic Counseling Consult    Patient: Yvette Weiss YOB: 1985   Date of Service: 2018 Referring Provider: Dr. Ilsa Wilson     Yvette Weiss was seen at [facility] Fetal [facility] for genetic counseling consultation to discuss the options for routine screening for fetal chromosome abnormalities. She was accompanied to clinic today by her .      Summary/Plan:     1. Today, we reviewed Yvette's pregnancy and family history, as well as discussed options for screening and testing during this pregnancy. See below for details.    2. After our discussion, Yvette had an ultrasound for nuchal translucency and nasal bone assesment and her blood drawn for first trimester screening. Results are expected within 5 days and will be available in EPIC. We will contact her to discuss the results, and a copy will be forwarded to the office of the referring prenatal care provider.     3. Even though AFP has been added to the first trimester screen, this first trimester measurement does NOT screen for neural tube defects. Therefore, maternal serum AFP (single marker screen) is still recommended to be offered after 15 weeks to screen for open neural tube defects.  A quad screen should not be performed, since a first trimester screen was drawn today.    Pregnancy History:   /Parity:      Age at Delivery: 33 year old  MADELINE: 2018, by Last Menstrual Period  Gestational Age: 13w3d    Yvette reports that she takes prenatal vitamins.  She reports that she also took Unisom/B6 and Zofran to treat nausea earlier in this pregnancy.      Yvette reports one episode of spotting at around 5 weeks; she reports no issues with bleeding since then.      Per her prenatal record, Yvette had an 8 week ultrasound that showed fetal growth consistent with her LMP.      Per her prenatal record, Yvette had gestational diabetes during her  "previous pregnancy.  She reports no other complications with those her two full term pregnancies.        Family History:     A pedigree was obtained during her previous pregnancy, and today we updated this previously taken family history.  Thus updated will be scanned in under the  Media  tab in EPIC. The following significant findings were reported by Yvette and her :      As previous noted during past genetic counseling appointments, Yvette reports: She and her biological full sister were both adopted by her parents at ages 8 and 9 years.  Their birth parents have histories of drug and alcohol abuse.  Her birth mother also has a history of bipolar disorder and schizophrenia.  Yvette's full sister has a history of developmental delay, posttraumatic stress disorder, and apraxia.  Her birth mother was known to be using drugs and alcoholol on a daily basis during her pregnancy. Yvette does also have 5 half siblings, some of whom are known to have learning disabilities and mental health concerns.  Additionally, there are multiple extended family members on both sides of the family who are known to have drug and alcohol additions, mental health concerns, and learning disabilities.  Yvette's mother also believes that she has a paternal 1st cousin with a diagnosis of cystic fibrosis        Yvette's  previously reported that he has a personal history of having a right big toe that \"curves in.\"  He reports that he has had multiple surgeries for this. He also reports that following:  He has a niece with ADHD and anxiety and a paternal half brother with depression/anxiety.  Wesly's mother did also have a sister (unknown if full or half) who passed away in her 30s and was described as having mild mental retardation.  She was able to care for herself, but did live in an \"assisted living\" type apartment.  She passed away from an accidental drug overdose.  Further details about her leaning/health are unknown.  There " are no other individuals with similar concerns reported    We also briefly reviewed the following risk information that had been discussed with them during their previous genetic counseling sessions:    1.) Family History of Cystic Fibrosis: We briefly reviewed the autosomal recessive inheritance of CF.  Given Yvette's family history, there is a 1 in 8 chance that she is a carrier for CF.  Because of her 's  ethnic background, there is a 1 in 25 chance that he is carrier.  This means that there is a 1/800 chance that they would have a child with CF, based on family history and ancestry.  We discussed again the availability of carrier screening, as well as  screening through the [place] Blood Spot program.  Yvette and her  reiterated that they are not overly concerned about the 1 in 800 chance of cystic fibrosis.  They chose to again decline carrier screening and understand that they my contact me or your office if they should change their minds.     2.) Family History of Mild Mental Retardation:  They are not aware of a particular genetic cause being identified for this issue, nor are they aware of any other family history of mental retardation.  There are many potential causes for mental retardation, including both genetic and environmental and multifactorial causes.  For some individuals, it can be difficult to sort out what the exact causes might be, and so it is often hard to give exact recurrence risks for other family members.    3.) Family History of Mental Illness, Learning Disabilities, and Drug/Alcohol Addition:  We reviewed the multifactorial inheritance of these concerns and discussed the recommendation that individuals with family histories insure that their primary care physician is aware of the histories so that they and their children may be appropriately screened and monitored and counseled on risk-reducing behavior.        Risk Assessment for Chromosome Conditions:        We talked about that the risk for fetal chromosome abnormalities increases with maternal age. We briefly discussed specific features of common chromosome abnormalities, including Down syndrome, trisomy 13, and trisomy 18.      At age 33 at delivery, the risk to have a baby with any chromosome abnormality at birth is about 1 in 253. (At age 33 at midtrimester, the risk to have a baby at midtrimester with any chromosome abnormality is about 1 in  172.)       Testing Options:       We reviewed the benefits and limitations of screening and diagnostic tests:    - We talked about that screening tests provide a risk assessment specific to the pregnancy for certain fetal chromosome abnormalities, but cannot definitively diagnose or exclude a fetal chromosome abnormality. Follow-up genetic counseling and consideration of diagnostic testing is recommended with any abnormal screening result.     - We discussed that diagnostic tests are associated with a risk of miscarriage. These tests can detect fetal chromosome abnormalities with greater than 99% certainty. Results can rarely be complicated by maternal cell contamination or mosaicism and are limited by the resolution of cytogenetic G-banding technology.     -There is no screening nor diagnostic test that can detect all forms of birth defects or mental disability.      We discussed the following screening and testing options for today:     First trimester screening  - This test includes a first trimester ultrasound with nuchal translucency and nasal bone assessments, as well as maternal serum hCG, CLAUDE-A, and AFP measurement.  - This test primarily screens for fetal trisomy 21, trisomy 13, and trisomy 18.  - This test cannot screen for open neural tube defects, and so consideration of maternal serum AFP 15 weeks or later is recommended.      We discussed the following screening and testing options most commonly considered if a first trimester screen showed an increased  risk for a chromosome problem in a pregnancy:     Non-invasive Prenatal Testing (NIPT)  - This test involves measurement of cell-free DNA testing, which is of both maternal and placental/fetal origin.  - First trimester ultrasound with nuchal translucency and nasal bone assessment is recommended to be performed along with NIPT, when appropriate.  - This test screens for fetal trisomy 21, trisomy 13, trisomy 18, and sex chromosome aneuploidy.  - While this test is thought to be highly specific/sensitive with respect to screening for trisomy 21, trisomy 13, and trisomy 18, rare false positives and false negatives do occur. There is still limited data about the exact sensitivity/specificity of this test with respect to screening for sex chromosome aneuploidy.  - Insurance coverage for this test is variable, and so patients are encouraged to contact their insurance companies if there are questions about coverage for this test.  -  This test cannot screen for open neural tube defects, and so consideration of maternal serum AFP 15 weeks or later is recommended.     Genetic Amniocentesis  - This is an invasive procedure typically performed at 15 weeks or later, through which amniotic fluid is obtained for the purpose of chromosome analysis and/or other prenatal genetic analysis.  - Amniocentesis is considered a diagnostic test for chromosome problems during pregnancy.  - The risk of pregnancy loss associated with amniocentesis is generally estimated to be 1/500 or less.  - Amniotic fluid AFP measurement is also done to screen for the possibility of open neural tube or ventral defects.     Comprehensive (Level II) ultrasound  - This detailed ultrasound is usually performed between 18-22 weeks gestation to screen for major birth defects.  - It also screens for ultrasound markers that might increase the risk for a chromosome problem in a pregnancy.     Face-to-face time of the genetic counseling meeting was 35 minutes.    Maite  MS Kady, [facility]  Genetic Counselor  Ph: [phone]  Pager: [phone]

## 2018-03-13 ENCOUNTER — TELEPHONE (OUTPATIENT)
Dept: MATERNAL FETAL MEDICINE | Facility: CLINIC | Age: 33
End: 2018-03-13

## 2018-03-13 NOTE — TELEPHONE ENCOUNTER
I called Yvette with her normal first trimester screen results.  We reviewed the low risk numbers given for both Down syndrome (less than 1 in 10,000) and for Trisomy 13/18 (less than 1 in 10,000).  We talked about that these risks are decreased as compared to her age-related risks.      Even though AFP has been added to the first trimester screen, this first trimester measurement does NOT screen for neural tube defects. Therefore, maternal serum AFP (single marker screen) is still recommended after 15 weeks to screen for open neural tube defects.      Test results should be available to her local prenatal care provider in Psychiatric.    Maite Hillman MS, [facility]  Genetic Counselor  Ph: [phone]  Pager: [phone]

## 2018-03-14 LAB — LAB SCANNED RESULT: NORMAL

## 2018-03-29 ENCOUNTER — PRENATAL OFFICE VISIT (OUTPATIENT)
Dept: OBGYN | Facility: CLINIC | Age: 33
End: 2018-03-29
Payer: COMMERCIAL

## 2018-03-29 VITALS
SYSTOLIC BLOOD PRESSURE: 129 MMHG | WEIGHT: 138.4 LBS | DIASTOLIC BLOOD PRESSURE: 64 MMHG | HEART RATE: 90 BPM | BODY MASS INDEX: 25.47 KG/M2 | TEMPERATURE: 98.6 F | HEIGHT: 62 IN | RESPIRATION RATE: 16 BRPM

## 2018-03-29 DIAGNOSIS — Z34.82 ENCOUNTER FOR SUPERVISION OF OTHER NORMAL PREGNANCY, SECOND TRIMESTER: Primary | ICD-10-CM

## 2018-03-29 PROCEDURE — 99207 ZZC PRENATAL VISIT: CPT | Performed by: OBSTETRICS & GYNECOLOGY

## 2018-03-29 NOTE — NURSING NOTE
"Chief Complaint   Patient presents with     Prenatal Care       Initial /64 (BP Location: Right arm, Patient Position: Chair, Cuff Size: Adult Regular)  Pulse 90  Temp 98.6  F (37  C) (Tympanic)  Resp 16  Ht 5' 2.25\" (1.581 m)  Wt 138 lb 6.4 oz (62.8 kg)  LMP 12/05/2017  BMI 25.11 kg/m2 Estimated body mass index is 25.11 kg/(m^2) as calculated from the following:    Height as of this encounter: 5' 2.25\" (1.581 m).    Weight as of this encounter: 138 lb 6.4 oz (62.8 kg).  Medication Reconciliation: complete    Paul MELGAR CMA    "

## 2018-03-29 NOTE — MR AVS SNAPSHOT
After Visit Summary   3/29/2018    Yvette Weiss    MRN: [id number]           Patient Information     Date Of Birth          1985        Visit Information        Provider Department      3/29/2018 2:30 PM Kristel Jolly MD [facility]        Today's Diagnoses     Encounter for supervision of other normal pregnancy, second trimester    -  1       Follow-ups after your visit        Your next 10 appointments already scheduled     Apr 26, 2018  1:00 PM CDT   ESTABLISHED PRENATAL with Ilsa Wilson MD   [facility] ([facility]    [address] [place] [address]   [phone]              Future tests that were ordered for you today     Open Future Orders        Priority Expected Expires Ordered    US OB > 14 Weeks Complete Single Routine 3/29/2018 3/29/2019 3/29/2018            Who to contact     If you have questions or need follow up information about today's clinic visit or your schedule please contact [facility] directly at [phone].  Normal or non-critical lab and imaging results will be communicated to you by MyChart, letter or phone within 4 business days after the clinic has received the results. If you do not hear from us within 7 days, please contact the clinic through Speekhart or phone. If you have a critical or abnormal lab result, we will notify you by phone as soon as possible.  Submit refill requests through [employer] or call your pharmacy and they will forward the refill request to us. Please allow 3 business days for your refill to be completed.          Additional Information About Your Visit        Speekhart Information     [employer] gives you secure access to your electronic health record. If you see a primary care provider, you can also send messages to your care team and make appointments. If you have questions, please call your primary care clinic.  If you do not have a primary care  "provider, please call [phone] and they will assist you.        Care EveryWhere ID     This is your Care EveryWhere ID. This could be used by other organizations to access your [place] medical records  [id number]        Your Vitals Were     Pulse Temperature Respirations Height Last Period BMI (Body Mass Index)    90 98.6  F (37  C) (Tympanic) 16 5' 2.25\" (1.581 m) 12/05/2017 25.11 kg/m2       Blood Pressure from Last 3 Encounters:   03/29/18 129/64   03/02/18 109/64   02/02/18 115/73    Weight from Last 3 Encounters:   03/29/18 138 lb 6.4 oz (62.8 kg)   03/02/18 132 lb 6.4 oz (60.1 kg)   02/02/18 129 lb (58.5 kg)               Primary Care Provider Fax #    Physician No Ref-Primary [phone]       No address on file        Equal Access to Services     BLANCA HERNANDEZ : Hadii ruba Desouza, waaxda luqadaha, qaybta kaalmada adeanthony, juan jose camejo . So [facility] [phone].    ATENCIÓN: Si habla español, tiene a nick disposición servicios gratuitos de asistencia lingüística. Llame al [phone].    We comply with applicable federal civil rights laws and [place] laws. We do not discriminate on the basis of race, color, national origin, age, disability, sex, sexual orientation, or gender identity.            Thank you!     Thank you for choosing [facility]  for your care. Our goal is always to provide you with excellent care. Hearing back from our patients is one way we can continue to improve our services. Please take a few minutes to complete the written survey that you may receive in the mail after your visit with us. Thank you!             Your Updated Medication List - Protect others around you: Learn how to safely use, store and throw away your medicines at www.disposemymeds.org.          This list is accurate as of 3/29/18  3:16 PM.  Always use your most recent med list.                   Brand Name Dispense Instructions for use Diagnosis    " cyanocobalamin 1000 MCG tablet    vitamin  B-12     Take by mouth daily        FOLIC ACID PO      Take 1 mg by mouth daily        KRILL OIL PO      Take 2 capsules by mouth daily        OMEGA-3 FATTY ACIDS PO      Take 1 capsule by mouth daily        ondansetron 4 MG ODT tab    ZOFRAN ODT    20 tablet    Take 1-2 tablets (4-8 mg) by mouth every 8 hours as needed for nausea    Hyperemesis gravidarum       prenatal multivitamin plus iron 27-0.8 MG Tabs per tablet     100 tablet    Take 1 tablet by mouth daily    Positive pregnancy test       VITAMIN D PO      Take 1,000 Units by mouth daily

## 2018-03-29 NOTE — PROGRESS NOTES
"Doing well.   No complaints.   Denies VB, ctx, LOF. +FM  /64 (BP Location: Right arm, Patient Position: Chair, Cuff Size: Adult Regular)  Pulse 90  Temp 98.6  F (37  C) (Tympanic)  Resp 16  Ht 5' 2.25\" (1.581 m)  Wt 138 lb 6.4 oz (62.8 kg)  LMP 2017  BMI 25.11 kg/m2  General Appearance: NAD  Abdomen: Gravid, NT  Refer to flow sheet above.   A/P: 33 year old  at 16w2d  -- fetal anatomy US ordered  -- discussed MS-AFP; patient decilnes  -- reviewed normal first trimester screen  -- SAB precautions reviewed  RTC in 4 weeks    Kristel Jolly MD  [facility]            "

## 2018-04-26 ENCOUNTER — HOSPITAL ENCOUNTER (OUTPATIENT)
Dept: ULTRASOUND IMAGING | Facility: CLINIC | Age: 33
Discharge: HOME | End: 2018-04-26
Attending: OBSTETRICS & GYNECOLOGY | Admitting: OBSTETRICS & GYNECOLOGY
Payer: COMMERCIAL

## 2018-04-26 ENCOUNTER — PRENATAL OFFICE VISIT (OUTPATIENT)
Dept: OBGYN | Facility: CLINIC | Age: 33
End: 2018-04-26
Payer: COMMERCIAL

## 2018-04-26 VITALS
SYSTOLIC BLOOD PRESSURE: 103 MMHG | TEMPERATURE: 97.9 F | RESPIRATION RATE: 16 BRPM | HEIGHT: 62 IN | BODY MASS INDEX: 26.91 KG/M2 | DIASTOLIC BLOOD PRESSURE: 65 MMHG | HEART RATE: 89 BPM | WEIGHT: 146.2 LBS

## 2018-04-26 DIAGNOSIS — Z3A.20 20 WEEKS GESTATION OF PREGNANCY: Primary | ICD-10-CM

## 2018-04-26 DIAGNOSIS — Z34.82 ENCOUNTER FOR SUPERVISION OF OTHER NORMAL PREGNANCY, SECOND TRIMESTER: ICD-10-CM

## 2018-04-26 PROCEDURE — 99207 ZZC PRENATAL VISIT: CPT | Performed by: OBSTETRICS & GYNECOLOGY

## 2018-04-26 PROCEDURE — 76805 OB US >/= 14 WKS SNGL FETUS: CPT

## 2018-04-26 NOTE — MR AVS SNAPSHOT
After Visit Summary   4/26/2018    Yvette Weiss    MRN: [id number]           Patient Information     Date Of Birth          1985        Visit Information        Provider Department      4/26/2018 1:00 PM Ilsa Wilson MD [facility]        Today's Diagnoses     20 weeks gestation of pregnancy    -  1       Follow-ups after your visit        Your next 10 appointments already scheduled     Apr 26, 2018  2:25 PM CDT   (Arrive by 2:10 PM)   US OB > 14 WEEKS COMPLETE SINGLE with ANDREW   [facility] ([facility])    [address] [place] [address] [phone]           Please bring a list of your medicines (including vitamins, minerals and over-the-counter drugs). Also, tell your doctor about any allergies you may have. Wear comfortable clothes and leave your valuables at home.  If you re less than 20 weeks drink four 8-ounce glasses of fluid an hour before your exam. If you need to empty your bladder before your exam, try to release only a little urine. Then, drink another glass of fluid.  You may have up to two family members in the exam room. If you bring a small child, an adult must be there to care for him or her.  Please call the Imaging Department at your exam site with any questions.            May 24, 2018  1:30 PM CDT   ESTABLISHED PRENATAL with Adalgisa Fajardo MD   [facility] ([facility]    [address] [place] [id number] [phone]              Future tests that were ordered for you today     Open Future Orders        Priority Expected Expires Ordered    Anti Treponema Routine  6/7/2018 4/26/2018    CBC with platelets Routine  6/7/2018 4/26/2018            Who to contact     If you have questions or need follow up information about today's clinic visit or your schedule please contact [facility] directly at [phone].  Normal or non-critical  "lab and imaging results will be communicated to you by [employer] Solarhart, letter or phone within 4 business days after the clinic has received the results. If you do not hear from us within 7 days, please contact the clinic through [employer] or phone. If you have a critical or abnormal lab result, we will notify you by phone as soon as possible.  Submit refill requests through [employer] or call your pharmacy and they will forward the refill request to us. Please allow 3 business days for your refill to be completed.          Additional Information About Your Visit        [employer] Information     [employer] gives you secure access to your electronic health record. If you see a primary care provider, you can also send messages to your care team and make appointments. If you have questions, please call your primary care clinic.  If you do not have a primary care provider, please call [phone] and they will assist you.        Care EveryWhere ID     This is your Care EveryWhere ID. This could be used by other organizations to access your [place] medical records  [id number]        Your Vitals Were     Pulse Temperature Respirations Height Last Period BMI (Body Mass Index)    89 97.9  F (36.6  C) 16 5' 2.25\" (1.581 m) 12/05/2017 26.53 kg/m2       Blood Pressure from Last 3 Encounters:   04/26/18 103/65   03/29/18 129/64   03/02/18 109/64    Weight from Last 3 Encounters:   04/26/18 146 lb 3.2 oz (66.3 kg)   03/29/18 138 lb 6.4 oz (62.8 kg)   03/02/18 132 lb 6.4 oz (60.1 kg)                 Today's Medication Changes          These changes are accurate as of 4/26/18  1:56 PM.  If you have any questions, ask your nurse or doctor.               Stop taking these medicines if you haven't already. Please contact your care team if you have questions.     ondansetron 4 MG ODT tab   Commonly known as:  ZOFRAN ODT   Stopped by:  Ilsa Wilson MD                    Primary Care Provider Fax #    Physician No Ref-Primary " [phone]       No address on file        Equal Access to Services     BLANCA HERNANDEZ : Hadii aad ku hadpriyankpatricia Desouza, waberniebradley brock, grantliv nunnmtbradley travis, juan jose azevedopranayjonna de la cruz. So [facility] [phone].    ATENCIÓN: Si habla español, tiene a nick disposición servicios gratuitos de asistencia lingüística. Llame al [phone].    We comply with applicable federal civil rights laws and [place] laws. We do not discriminate on the basis of race, color, national origin, age, disability, sex, sexual orientation, or gender identity.            Thank you!     Thank you for choosing [facility]  for your care. Our goal is always to provide you with excellent care. Hearing back from our patients is one way we can continue to improve our services. Please take a few minutes to complete the written survey that you may receive in the mail after your visit with us. Thank you!             Your Updated Medication List - Protect others around you: Learn how to safely use, store and throw away your medicines at www.disposemymeds.org.          This list is accurate as of 4/26/18  1:56 PM.  Always use your most recent med list.                   Brand Name Dispense Instructions for use Diagnosis    cyanocobalamin 1000 MCG tablet    vitamin  B-12     Take by mouth daily        FOLIC ACID PO      Take 1 mg by mouth daily        KRILL OIL PO      Take 2 capsules by mouth daily        OMEGA-3 FATTY ACIDS PO      Take 1 capsule by mouth daily        prenatal multivitamin plus iron 27-0.8 MG Tabs per tablet     100 tablet    Take 1 tablet by mouth daily    Positive pregnancy test       VITAMIN D PO      Take 1,000 Units by mouth daily

## 2018-04-26 NOTE — PROGRESS NOTES
Normal fetal anatomy ultrasound  Will review at next follow-up visit.     Kristel Jolly MD  [facility]

## 2018-04-26 NOTE — PROGRESS NOTES
"CC: Here for routine prenatal visit @ 20w2d   HPI:  Pt going to start BS testing en lieu of glucola testing; feeling well; sonogram later today    PE: /65  Pulse 89  Temp 97.9  F (36.6  C)  Resp 16  Ht 5' 2.25\" (1.581 m)  Wt 146 lb 3.2 oz (66.3 kg)  LMP 12/05/2017  BMI 26.53 kg/m2   See OB flowsheet      A:  1. 20 weeks gestation of pregnancy        Routine prenatal care  Reviewed parameters of FBS <100 and 2hr PP<120  RTC 4 weeks.      Ilsa Wilson M.D.     "

## 2018-04-26 NOTE — NURSING NOTE
"Initial /65  Pulse 89  Temp 97.9  F (36.6  C)  Resp 16  Ht 5' 2.25\" (1.581 m)  Wt 146 lb 3.2 oz (66.3 kg)  LMP 12/05/2017  BMI 26.53 kg/m2 Estimated body mass index is 26.53 kg/(m^2) as calculated from the following:    Height as of this encounter: 5' 2.25\" (1.581 m).    Weight as of this encounter: 146 lb 3.2 oz (66.3 kg). .      "

## 2018-05-31 ENCOUNTER — PRENATAL OFFICE VISIT (OUTPATIENT)
Dept: OBGYN | Facility: CLINIC | Age: 33
End: 2018-05-31
Payer: COMMERCIAL

## 2018-05-31 VITALS
TEMPERATURE: 97.9 F | DIASTOLIC BLOOD PRESSURE: 66 MMHG | RESPIRATION RATE: 16 BRPM | HEIGHT: 62 IN | HEART RATE: 88 BPM | BODY MASS INDEX: 28.67 KG/M2 | SYSTOLIC BLOOD PRESSURE: 106 MMHG | WEIGHT: 155.8 LBS

## 2018-05-31 DIAGNOSIS — Z34.81 ENCOUNTER FOR SUPERVISION OF OTHER NORMAL PREGNANCY, FIRST TRIMESTER: ICD-10-CM

## 2018-05-31 DIAGNOSIS — Z34.82 ENCOUNTER FOR SUPERVISION OF OTHER NORMAL PREGNANCY IN SECOND TRIMESTER: Primary | ICD-10-CM

## 2018-05-31 LAB
ALBUMIN UR-MCNC: NEGATIVE MG/DL
APPEARANCE UR: CLEAR
BILIRUB UR QL STRIP: NEGATIVE
COLOR UR AUTO: YELLOW
ERYTHROCYTE [DISTWIDTH] IN BLOOD BY AUTOMATED COUNT: 12.9 % (ref 10–15)
GLUCOSE UR STRIP-MCNC: NEGATIVE MG/DL
HCT VFR BLD AUTO: 36.1 % (ref 35–47)
HGB BLD-MCNC: 11.8 G/DL (ref 11.7–15.7)
HGB UR QL STRIP: NEGATIVE
KETONES UR STRIP-MCNC: NEGATIVE MG/DL
LEUKOCYTE ESTERASE UR QL STRIP: NEGATIVE
MCH RBC QN AUTO: 31.3 PG (ref 26.5–33)
MCHC RBC AUTO-ENTMCNC: 32.7 G/DL (ref 31.5–36.5)
MCV RBC AUTO: 96 FL (ref 78–100)
NITRATE UR QL: NEGATIVE
PH UR STRIP: 6 PH (ref 5–7)
PLATELET # BLD AUTO: 164 10E9/L (ref 150–450)
RBC # BLD AUTO: 3.77 10E12/L (ref 3.8–5.2)
SOURCE: NORMAL
SP GR UR STRIP: 1.01 (ref 1–1.03)
UROBILINOGEN UR STRIP-ACNC: 0.2 EU/DL (ref 0.2–1)
WBC # BLD AUTO: 10.3 10E9/L (ref 4–11)

## 2018-05-31 PROCEDURE — 85027 COMPLETE CBC AUTOMATED: CPT | Performed by: OBSTETRICS & GYNECOLOGY

## 2018-05-31 PROCEDURE — 99207 ZZC PRENATAL VISIT: CPT | Performed by: OBSTETRICS & GYNECOLOGY

## 2018-05-31 PROCEDURE — 86780 TREPONEMA PALLIDUM: CPT | Performed by: OBSTETRICS & GYNECOLOGY

## 2018-05-31 PROCEDURE — 81003 URINALYSIS AUTO W/O SCOPE: CPT | Performed by: OBSTETRICS & GYNECOLOGY

## 2018-05-31 PROCEDURE — 36415 COLL VENOUS BLD VENIPUNCTURE: CPT | Performed by: OBSTETRICS & GYNECOLOGY

## 2018-05-31 PROCEDURE — 87086 URINE CULTURE/COLONY COUNT: CPT | Performed by: OBSTETRICS & GYNECOLOGY

## 2018-05-31 NOTE — MR AVS SNAPSHOT
"              After Visit Summary   5/31/2018    Yvette Weiss    MRN: [id number]           Patient Information     Date Of Birth          1985        Visit Information        Provider Department      5/31/2018 1:30 PM Jolynn Cha MD [facility]        Today's Diagnoses     Encounter for supervision of other normal pregnancy in second trimester    -  1    Encounter for supervision of other normal pregnancy, first trimester           Follow-ups after your visit        Who to contact     If you have questions or need follow up information about today's clinic visit or your schedule please contact [facility] directly at [phone].  Normal or non-critical lab and imaging results will be communicated to you by [employer], letter or phone within 4 business days after the clinic has received the results. If you do not hear from us within 7 days, please contact the clinic through [employer] or phone. If you have a critical or abnormal lab result, we will notify you by phone as soon as possible.  Submit refill requests through [employer] or call your pharmacy and they will forward the refill request to us. Please allow 3 business days for your refill to be completed.          Additional Information About Your Visit        MyChart Information     [employer] gives you secure access to your electronic health record. If you see a primary care provider, you can also send messages to your care team and make appointments. If you have questions, please call your primary care clinic.  If you do not have a primary care provider, please call [phone] and they will assist you.        Care EveryWhere ID     This is your Care EveryWhere ID. This could be used by other organizations to access your [place] medical records  [id number]        Your Vitals Were     Pulse Temperature Respirations Height Last Period Breastfeeding?    88 97.9  F (36.6  C) (Tympanic) 16 5' 2.25\" (1.581 m) 12/05/2017 No    BMI " (Body Mass Index)                   28.27 kg/m2            Blood Pressure from Last 3 Encounters:   05/31/18 106/66   04/26/18 103/65   03/29/18 129/64    Weight from Last 3 Encounters:   05/31/18 155 lb 12.8 oz (70.7 kg)   04/26/18 146 lb 3.2 oz (66.3 kg)   03/29/18 138 lb 6.4 oz (62.8 kg)              We Performed the Following     *UA reflex to Microscopic     Anti Treponema     CBC with platelets     Urine Culture Aerobic Bacterial        Primary Care Provider Fax #    Physician No Ref-Primary [phone]       No address on file        Equal Access to Services     LAWANDA HERNANDEZ : Kwame Desouza, bozena brock, wilmer travis, juan jose camejo . So [facility] [phone].    ATENCIÓN: Si habla español, tiene a nick disposición servicios gratuitos de asistencia lingüística. Llame al [phone].    We comply with applicable federal civil rights laws and [place] laws. We do not discriminate on the basis of race, color, national origin, age, disability, sex, sexual orientation, or gender identity.            Thank you!     Thank you for choosing [facility]  for your care. Our goal is always to provide you with excellent care. Hearing back from our patients is one way we can continue to improve our services. Please take a few minutes to complete the written survey that you may receive in the mail after your visit with us. Thank you!             Your Updated Medication List - Protect others around you: Learn how to safely use, store and throw away your medicines at www.disposemymeds.org.          This list is accurate as of 5/31/18  1:56 PM.  Always use your most recent med list.                   Brand Name Dispense Instructions for use Diagnosis    cyanocobalamin 1000 MCG tablet    vitamin  B-12     Take by mouth daily        FOLIC ACID PO      Take 1 mg by mouth daily        KRILL OIL PO      Take 2 capsules by mouth daily        OMEGA-3 FATTY ACIDS PO       Take 1 capsule by mouth daily        prenatal multivitamin plus iron 27-0.8 MG Tabs per tablet     100 tablet    Take 1 tablet by mouth daily    Positive pregnancy test       VITAMIN D PO      Take 1,000 Units by mouth daily

## 2018-05-31 NOTE — NURSING NOTE
"Initial /66 (BP Location: Left arm, Patient Position: Chair, Cuff Size: Adult Regular)  Pulse 88  Temp 97.9  F (36.6  C) (Tympanic)  Resp 16  Ht 5' 2.25\" (1.581 m)  Wt 155 lb 12.8 oz (70.7 kg)  LMP 12/05/2017  Breastfeeding? No  BMI 28.27 kg/m2 Estimated body mass index is 28.27 kg/(m^2) as calculated from the following:    Height as of this encounter: 5' 2.25\" (1.581 m).    Weight as of this encounter: 155 lb 12.8 oz (70.7 kg). .    Ale Wang, [facility]    "

## 2018-05-31 NOTE — PROGRESS NOTES
"CC: Here for routine prenatal visit @ 25w2d   HPI: + FM, no ctx, no LOF, no VB.  No complaints.     PE: /66 (BP Location: Left arm, Patient Position: Chair, Cuff Size: Adult Regular)  Pulse 88  Temp 97.9  F (36.6  C) (Tympanic)  Resp 16  Ht 5' 2.25\" (1.581 m)  Wt 155 lb 12.8 oz (70.7 kg)  LMP 2017  Breastfeeding? No  BMI 28.27 kg/m2   See OB flowsheet    Hx GDM--declines GCT.  Has been checking sugars--WNL    A/P  @ 25w2d normal pregnancy    1. Routine prenatal care.  Still need UA C&S and will check cbc/rpr today    RTC 4 weeks.      Jolynn Cha M.D.    "

## 2018-06-01 LAB
BACTERIA SPEC CULT: NO GROWTH
Lab: NORMAL
SPECIMEN SOURCE: NORMAL
T PALLIDUM AB SER QL: NONREACTIVE

## 2018-06-28 ENCOUNTER — PRENATAL OFFICE VISIT (OUTPATIENT)
Dept: OBGYN | Facility: CLINIC | Age: 33
End: 2018-06-28
Payer: COMMERCIAL

## 2018-06-28 VITALS
HEART RATE: 96 BPM | TEMPERATURE: 98.2 F | SYSTOLIC BLOOD PRESSURE: 117 MMHG | WEIGHT: 161 LBS | DIASTOLIC BLOOD PRESSURE: 65 MMHG | RESPIRATION RATE: 16 BRPM | HEIGHT: 62 IN | BODY MASS INDEX: 29.63 KG/M2

## 2018-06-28 DIAGNOSIS — Z34.83 PRENATAL CARE, SUBSEQUENT PREGNANCY IN THIRD TRIMESTER: Primary | ICD-10-CM

## 2018-06-28 PROCEDURE — 99207 ZZC PRENATAL VISIT: CPT | Performed by: OBSTETRICS & GYNECOLOGY

## 2018-06-28 NOTE — MR AVS SNAPSHOT
After Visit Summary   6/28/2018    Yvette Weiss    MRN: [id number]           Patient Information     Date Of Birth          1985        Visit Information        Provider Department      6/28/2018 1:00 PM Jodi Yusuf MD [facility]        Today's Diagnoses     Prenatal care, subsequent pregnancy in third trimester    -  1       Follow-ups after your visit        Follow-up notes from your care team     Return in about 2 weeks (around 7/12/2018).      Your next 10 appointments already scheduled     Jul 19, 2018  2:00 PM CDT   ESTABLISHED PRENATAL with Kristel Jolly MD   [facility] ([facility])    [address] [address] [phone]              Who to contact     If you have questions or need follow up information about today's clinic visit or your schedule please contact [facility] directly at [phone].  Normal or non-critical lab and imaging results will be communicated to you by KloudNationhart, letter or phone within 4 business days after the clinic has received the results. If you do not hear from us within 7 days, please contact the clinic through [employer] or phone. If you have a critical or abnormal lab result, we will notify you by phone as soon as possible.  Submit refill requests through [employer] or call your pharmacy and they will forward the refill request to us. Please allow 3 business days for your refill to be completed.          Additional Information About Your Visit        MyChart Information     [employer] gives you secure access to your electronic health record. If you see a primary care provider, you can also send messages to your care team and make appointments. If you have questions, please call your primary care clinic.  If you do not have a primary care provider, please call [phone] and they will assist you.        Care EveryWhere ID     This is your Care EveryWhere ID. This  "could be used by other organizations to access your [place] medical records  [id number]        Your Vitals Were     Pulse Temperature Respirations Height Last Period BMI (Body Mass Index)    96 98.2  F (36.8  C) 16 1.581 m (5' 2.25\") 12/05/2017 29.21 kg/m2       Blood Pressure from Last 3 Encounters:   06/28/18 117/65   05/31/18 106/66   04/26/18 103/65    Weight from Last 3 Encounters:   06/28/18 73 kg (161 lb)   05/31/18 70.7 kg (155 lb 12.8 oz)   04/26/18 66.3 kg (146 lb 3.2 oz)              Today, you had the following     No orders found for display       Primary Care Provider Fax #    Physician No Ref-Primary [phone]       No address on file        Equal Access to Services     BLANCA HERNANDEZ : Kwame Desouza, bozena brock, wilmer travis, juan jose camejo . So [facility] [phone].    ATENCIÓN: Si habla español, tiene a nick disposición servicios gratuitos de asistencia lingüística. Jordin al [phone].    We comply with applicable federal civil rights laws and [place] laws. We do not discriminate on the basis of race, color, national origin, age, disability, sex, sexual orientation, or gender identity.            Thank you!     Thank you for choosing [facility]  for your care. Our goal is always to provide you with excellent care. Hearing back from our patients is one way we can continue to improve our services. Please take a few minutes to complete the written survey that you may receive in the mail after your visit with us. Thank you!             Your Updated Medication List - Protect others around you: Learn how to safely use, store and throw away your medicines at www.disposemymeds.org.          This list is accurate as of 6/28/18  1:38 PM.  Always use your most recent med list.                   Brand Name Dispense Instructions for use Diagnosis    cyanocobalamin 1000 MCG tablet    vitamin  B-12     Take by mouth daily        FOLIC " ACID PO      Take 1 mg by mouth daily        KRILL OIL PO      Take 2 capsules by mouth daily        OMEGA-3 FATTY ACIDS PO      Take 1 capsule by mouth daily        prenatal multivitamin plus iron 27-0.8 MG Tabs per tablet     100 tablet    Take 1 tablet by mouth daily    Positive pregnancy test       VITAMIN D PO      Take 1,000 Units by mouth daily

## 2018-06-28 NOTE — PROGRESS NOTES
Doing well.  +FM, occ BH ctx, no VB or LOF.  Pubic bone pain.    33 year old  at 29w2d   - h/o GDM, been doing BG monitoring: fastings 70-90, 2h postprandial 116-120.  Pt planning on using up test strips and then stopping.  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

## 2018-07-19 ENCOUNTER — PRENATAL OFFICE VISIT (OUTPATIENT)
Dept: OBGYN | Facility: CLINIC | Age: 33
End: 2018-07-19
Payer: COMMERCIAL

## 2018-07-19 VITALS
WEIGHT: 166 LBS | SYSTOLIC BLOOD PRESSURE: 110 MMHG | TEMPERATURE: 97 F | BODY MASS INDEX: 30.12 KG/M2 | DIASTOLIC BLOOD PRESSURE: 57 MMHG | HEART RATE: 92 BPM

## 2018-07-19 DIAGNOSIS — Z34.83 ENCOUNTER FOR SUPERVISION OF OTHER NORMAL PREGNANCY IN THIRD TRIMESTER: Primary | ICD-10-CM

## 2018-07-19 PROCEDURE — 99207 ZZC PRENATAL VISIT: CPT | Performed by: OBSTETRICS & GYNECOLOGY

## 2018-07-19 NOTE — PROGRESS NOTES
Concerns: No complaints. Has finished her test strips for glucose monitoring. Blood glucose levels have been normal.   Contractions are infrequent; still dealing with pubic bone discomfort.   No vaginal bleeding, LOF.Reports fetal movement  Discussed kick counts and fetal movement.  /57 (BP Location: Right arm, Patient Position: Chair, Cuff Size: Adult Large)  Pulse 92  Temp 97  F (36.1  C) (Tympanic)  Wt 166 lb (75.3 kg)  LMP 12/05/2017  Breastfeeding? No  BMI 30.12 kg/m2  Discussed PTL, PROM, and when to call or come in.  Checklist updated, see prenatal flowsheet for details  Hx of GDM: no longer monitoring glucose as they have been normal  RTC 2 weeks.    Kristel Jolly MD

## 2018-07-19 NOTE — MR AVS SNAPSHOT
After Visit Summary   7/19/2018    Yvette Weiss    MRN: [id number]           Patient Information     Date Of Birth          1985        Visit Information        Provider Department      7/19/2018 2:00 PM Kristel Jolly MD [facility]        Today's Diagnoses     Encounter for supervision of other normal pregnancy in third trimester    -  1       Follow-ups after your visit        Who to contact     If you have questions or need follow up information about today's clinic visit or your schedule please contact [facility] directly at [phone].  Normal or non-critical lab and imaging results will be communicated to you by Fnboxhart, letter or phone within 4 business days after the clinic has received the results. If you do not hear from us within 7 days, please contact the clinic through Fnboxhart or phone. If you have a critical or abnormal lab result, we will notify you by phone as soon as possible.  Submit refill requests through Agency Entourage or call your pharmacy and they will forward the refill request to us. Please allow 3 business days for your refill to be completed.          Additional Information About Your Visit        MyChart Information     Agency Entourage gives you secure access to your electronic health record. If you see a primary care provider, you can also send messages to your care team and make appointments. If you have questions, please call your primary care clinic.  If you do not have a primary care provider, please call [phone] and they will assist you.        Care EveryWhere ID     This is your Care EveryWhere ID. This could be used by other organizations to access your [place] medical records  [id number]        Your Vitals Were     Pulse Temperature Last Period Breastfeeding? BMI (Body Mass Index)       92 97  F (36.1  C) (Tympanic) 12/05/2017 No 30.12 kg/m2        Blood Pressure from Last 3 Encounters:   07/19/18 110/57   06/28/18 117/65    05/31/18 106/66    Weight from Last 3 Encounters:   07/19/18 166 lb (75.3 kg)   06/28/18 161 lb (73 kg)   05/31/18 155 lb 12.8 oz (70.7 kg)              Today, you had the following     No orders found for display       Primary Care Provider Fax #    Physician No Ref-Primary [phone]       No address on file        Equal Access to Services     LAWANDA HERNANDEZ : Hadii aad ku hadpriyanko Sodadaali, waaxda luqadaha, qaybta kaalmada adeegyada, juan jose hopkinsin wintern danny serajonna quinteroAnabellamisha . So [facility] [phone].    ATENCIÓN: Si habla español, tiene a nick disposición servicios gratuitos de asistencia lingüística. Jobyame al [phone].    We comply with applicable federal civil rights laws and [place] laws. We do not discriminate on the basis of race, color, national origin, age, disability, sex, sexual orientation, or gender identity.            Thank you!     Thank you for choosing [facility]  for your care. Our goal is always to provide you with excellent care. Hearing back from our patients is one way we can continue to improve our services. Please take a few minutes to complete the written survey that you may receive in the mail after your visit with us. Thank you!             Your Updated Medication List - Protect others around you: Learn how to safely use, store and throw away your medicines at www.disposemymeds.org.          This list is accurate as of 7/19/18  2:03 PM.  Always use your most recent med list.                   Brand Name Dispense Instructions for use Diagnosis    cyanocobalamin 1000 MCG tablet    vitamin  B-12     Take by mouth daily        FOLIC ACID PO      Take 1 mg by mouth daily        KRILL OIL PO      Take 2 capsules by mouth daily        OMEGA-3 FATTY ACIDS PO      Take 1 capsule by mouth daily        prenatal multivitamin plus iron 27-0.8 MG Tabs per tablet     100 tablet    Take 1 tablet by mouth daily    Positive pregnancy test       VITAMIN D PO      Take 1,000 Units by mouth  daily

## 2018-08-02 ENCOUNTER — PRENATAL OFFICE VISIT (OUTPATIENT)
Dept: OBGYN | Facility: CLINIC | Age: 33
End: 2018-08-02
Payer: COMMERCIAL

## 2018-08-02 VITALS
WEIGHT: 170.6 LBS | HEIGHT: 62 IN | BODY MASS INDEX: 31.39 KG/M2 | SYSTOLIC BLOOD PRESSURE: 121 MMHG | TEMPERATURE: 97.6 F | RESPIRATION RATE: 16 BRPM | HEART RATE: 92 BPM | DIASTOLIC BLOOD PRESSURE: 71 MMHG

## 2018-08-02 DIAGNOSIS — Z34.83 PRENATAL CARE, SUBSEQUENT PREGNANCY, THIRD TRIMESTER: Primary | ICD-10-CM

## 2018-08-02 PROCEDURE — 99207 ZZC PRENATAL VISIT: CPT | Performed by: OBSTETRICS & GYNECOLOGY

## 2018-08-02 NOTE — PROGRESS NOTES
Concerns: Blood sugar were normal  Doing well.  No concerns today.  No vaginal bleeding, LOF.  No contractions.  Reportable signs and symptoms discussed.  Discussed kick counts and fetal movement.  Discussed PTL, PROM, and when to call or come in.  Checklist updated, see prenatal flowsheet for details  RTC 2 weeks.    Cheng Sims MD

## 2018-08-17 ENCOUNTER — PRENATAL OFFICE VISIT (OUTPATIENT)
Dept: OBGYN | Facility: CLINIC | Age: 33
End: 2018-08-17
Payer: COMMERCIAL

## 2018-08-17 VITALS
RESPIRATION RATE: 16 BRPM | SYSTOLIC BLOOD PRESSURE: 115 MMHG | TEMPERATURE: 98.4 F | HEIGHT: 62 IN | DIASTOLIC BLOOD PRESSURE: 60 MMHG | BODY MASS INDEX: 32.28 KG/M2 | WEIGHT: 175.4 LBS | HEART RATE: 76 BPM

## 2018-08-17 DIAGNOSIS — Z34.83 ENCOUNTER FOR SUPERVISION OF OTHER NORMAL PREGNANCY IN THIRD TRIMESTER: Primary | ICD-10-CM

## 2018-08-17 DIAGNOSIS — Z34.80 PRENATAL CARE, SUBSEQUENT PREGNANCY, UNSPECIFIED TRIMESTER: ICD-10-CM

## 2018-08-17 PROCEDURE — 87653 STREP B DNA AMP PROBE: CPT | Performed by: OBSTETRICS & GYNECOLOGY

## 2018-08-17 PROCEDURE — 99207 ZZC PRENATAL VISIT: CPT | Performed by: OBSTETRICS & GYNECOLOGY

## 2018-08-17 NOTE — PROGRESS NOTES
Concerns:   .  Doing well.  No concerns today.  No vaginal bleeding, LOF, contractions.  No HA, RUQ pain, N/V, visual changes.  Cervix check next visit.  GBS done today.  Labor precautions discussed.  RTC 1 week.  GBS obtained  Prenatal flowsheet information is reviewed.    Cheng Sims MD

## 2018-08-17 NOTE — MR AVS SNAPSHOT
After Visit Summary   8/17/2018    Yvette Weiss    MRN: [id number]           Patient Information     Date Of Birth          1985        Visit Information        Provider Department      8/17/2018 2:00 PM Cheng iSms MD [facility]        Today's Diagnoses     Encounter for supervision of other normal pregnancy in third trimester    -  1    Prenatal care, subsequent pregnancy, unspecified trimester           Follow-ups after your visit        Your next 10 appointments already scheduled     Aug 23, 2018  2:00 PM CDT   [facility]    [address] [place] [address]   [phone]            Aug 30, 2018  2:00 PM CDT   [facility]ABL[facility]ATAL [facility]    [address] [address]   [phone]              Who to contact     If you have questions or need follow up information about today's clinic visit or your schedule please contact [facility] directly at [phone].  Normal or non-critical lab and imaging results will be communicated to you by MyChart, letter or phone within 4 business days after the clinic has received the results. If you do not hear from us within 7 days, please contact the clinic through Notable Limitedhart or phone. If you have a critical or abnormal lab result, we will notify you by phone as soon as possible.  Submit refill requests through Biztag or call your pharmacy and they will forward the refill request to us. Please allow 3 business days for your refill to be completed.          Additional Information About Your Visit        MyChart Information     Biztag gives you secure access to your electronic health record. If you see a primary care provider, you can also send messages to your care team and make appointments. If you have  "questions, please call your primary care clinic.  If you do not have a primary care provider, please call [phone] and they will assist you.        Care EveryWhere ID     This is your Care EveryWhere ID. This could be used by other organizations to access your [place] medical records  [id number]        Your Vitals Were     Pulse Temperature Respirations Height Last Period Breastfeeding?    76 98.4  F (36.9  C) (Tympanic) 16 5' 2.25\" (1.581 m) 12/05/2017 No    BMI (Body Mass Index)                   31.82 kg/m2            Blood Pressure from Last 3 Encounters:   08/17/18 115/60   08/02/18 121/71   07/19/18 110/57    Weight from Last 3 Encounters:   08/17/18 175 lb 6.4 oz (79.6 kg)   08/02/18 170 lb 9.6 oz (77.4 kg)   07/19/18 166 lb (75.3 kg)              We Performed the Following     Group B strep PCR        Primary Care Provider Fax #    Physician No Ref-Primary [phone]       No address on file        Equal Access to Services     [facility]: Hadii ruba Desouza, waaxda luqadaha, qaybta kaalmabradley travis, juan jose camejo . So [facility] [phone].    ATENCIÓN: Si habla español, tiene a nick disposición servicios gratuitos de asistencia lingüística. Llame al [phone].    We comply with applicable federal civil rights laws and [place] laws. We do not discriminate on the basis of race, color, national origin, age, disability, sex, sexual orientation, or gender identity.            Thank you!     Thank you for choosing [facility]  for your care. Our goal is always to provide you with excellent care. Hearing back from our patients is one way we can continue to improve our services. Please take a few minutes to complete the written survey that you may receive in the mail after your visit with us. Thank you!             Your Updated Medication List - Protect others around you: Learn how to safely use, store and throw away your medicines at " www.disposemymeds.org.          This list is accurate as of 8/17/18  2:40 PM.  Always use your most recent med list.                   Brand Name Dispense Instructions for use Diagnosis    cyanocobalamin 1000 MCG tablet    vitamin  B-12     Take by mouth daily        FOLIC ACID PO      Take 1 mg by mouth daily        KRILL OIL PO      Take 2 capsules by mouth daily        OMEGA-3 FATTY ACIDS PO      Take 1 capsule by mouth daily        prenatal multivitamin plus iron 27-0.8 MG Tabs per tablet     100 tablet    Take 1 tablet by mouth daily    Positive pregnancy test       VITAMIN D PO      Take 1,000 Units by mouth daily

## 2018-08-17 NOTE — NURSING NOTE
"Initial /60 (BP Location: Right arm, Patient Position: Sitting, Cuff Size: Adult Regular)  Pulse 76  Temp 98.4  F (36.9  C) (Tympanic)  Resp 16  Ht 5' 2.25\" (1.581 m)  Wt 175 lb 6.4 oz (79.6 kg)  LMP 12/05/2017  Breastfeeding? No  BMI 31.82 kg/m2 Estimated body mass index is 31.82 kg/(m^2) as calculated from the following:    Height as of this encounter: 5' 2.25\" (1.581 m).    Weight as of this encounter: 175 lb 6.4 oz (79.6 kg). .    Suze Romero, [facility]    "

## 2018-08-18 LAB
GP B STREP DNA SPEC QL NAA+PROBE: NEGATIVE
SPECIMEN SOURCE: NORMAL

## 2018-08-20 NOTE — PROGRESS NOTES
GBS negative status.   Will review at next follow-up visit.     Kristel Jolly MD  [facility]

## 2018-08-23 ENCOUNTER — PRENATAL OFFICE VISIT (OUTPATIENT)
Dept: OBGYN | Facility: CLINIC | Age: 33
End: 2018-08-23
Payer: COMMERCIAL

## 2018-08-23 VITALS
RESPIRATION RATE: 16 BRPM | TEMPERATURE: 97.4 F | BODY MASS INDEX: 32.13 KG/M2 | HEIGHT: 62 IN | DIASTOLIC BLOOD PRESSURE: 69 MMHG | WEIGHT: 174.6 LBS | SYSTOLIC BLOOD PRESSURE: 108 MMHG | HEART RATE: 85 BPM

## 2018-08-23 DIAGNOSIS — Z34.83 ENCOUNTER FOR SUPERVISION OF OTHER NORMAL PREGNANCY IN THIRD TRIMESTER: Primary | ICD-10-CM

## 2018-08-23 PROCEDURE — 99207 ZZC PRENATAL VISIT: CPT | Performed by: OBSTETRICS & GYNECOLOGY

## 2018-08-23 NOTE — PROGRESS NOTES
"Doing well.   Feels a little more pelvic pressure.   Denies VB, ctx, LOF. +FM  /69 (BP Location: Right arm, Patient Position: Chair, Cuff Size: Adult Regular)  Pulse 85  Temp 97.4  F (36.3  C) (Tympanic)  Resp 16  Ht 5' 2.25\" (1.581 m)  Wt 174 lb 9.6 oz (79.2 kg)  LMP 2017  BMI 31.68 kg/m2  General Appearance: NAD  Abdomen: Gravid, NT  Refer to flow sheet above.   A/P: 33 year old  at 37w2d  -- reviewed GBS negative status  -- labor precautions reviewed  RTC in 1 week    Kristel Jolly MD  [facility]            "

## 2018-08-23 NOTE — MR AVS SNAPSHOT
After Visit Summary   8/23/2018    Yvette Weiss    MRN: [id number]           Patient Information     Date Of Birth          1985        Visit Information        Provider Department      8/23/2018 2:00 PM Kristel Jolly MD [facility]        Today's Diagnoses     Encounter for supervision of other normal pregnancy in third trimester    -  1       Follow-ups after your visit        Your next 10 appointments already scheduled     Aug 30, 2018  2:00 PM CDT   ESTABLISHED PRENATAL with [facility]el [facility] MD   [facility]    [address] [place] [address]   [phone]            Sep 06, 2018  2:15 PM CDT   ESTABLISHED PRENATAL with Jodi Yusuf MD   [facility]    [address] [place] [address]   [phone]            Sep 13, 2018  1:45 PM CDT   ESTABLISHED PRENATAL with Cheng Sims MD   [facility]    [address] [place] [address]   [phone]              Who to contact     If you have questions or need follow up information about today's clinic visit or your schedule please contact [facility] directly at [phone].  Normal or non-critical lab and imaging results will be communicated to you by MyChart, letter or phone within 4 business days after the clinic has received the results. If you do not hear from us within 7 days, please contact the clinic through MyChart or phone. If you have a critical or abnormal lab result, we will notify you by phone as soon as possible.  Submit refill requests through Evince or call your pharmacy and they will forward the refill request to us. Please allow 3 business days for your refill to be completed.          Additional Information About Your Visit        MyChart Information     Evince gives you secure access to your  "electronic health record. If you see a primary care provider, you can also send messages to your care team and make appointments. If you have questions, please call your primary care clinic.  If you do not have a primary care provider, please call [phone] and they will assist you.        Care EveryWhere ID     This is your Care EveryWhere ID. This could be used by other organizations to access your [place] medical records  [id number]        Your Vitals Were     Pulse Temperature Respirations Height Last Period BMI (Body Mass Index)    85 97.4  F (36.3  C) (Tympanic) 16 5' 2.25\" (1.581 m) 12/05/2017 31.68 kg/m2       Blood Pressure from Last 3 Encounters:   08/23/18 108/69   08/17/18 115/60   08/02/18 121/71    Weight from Last 3 Encounters:   08/23/18 174 lb 9.6 oz (79.2 kg)   08/17/18 175 lb 6.4 oz (79.6 kg)   08/02/18 170 lb 9.6 oz (77.4 kg)              Today, you had the following     No orders found for display       Primary Care Provider Fax #    Physician No Ref-Primary [phone]       No address on file        Equal Access to Services     BLANCA HERNANDEZ : Hadii ruba Desouza, wabernieda delmy, qaybta kaalmada adecorinneda, juan jose camejo . So [facility] [phone].    ATENCIÓN: Si habla español, tiene a nick disposición servicios gratuitos de asistencia lingüística. Jobyame al [phone].    We comply with applicable federal civil rights laws and [place] laws. We do not discriminate on the basis of race, color, national origin, age, disability, sex, sexual orientation, or gender identity.            Thank you!     Thank you for choosing [facility]  for your care. Our goal is always to provide you with excellent care. Hearing back from our patients is one way we can continue to improve our services. Please take a few minutes to complete the written survey that you may receive in the mail after your visit with us. Thank you!             Your Updated Medication " List - Protect others around you: Learn how to safely use, store and throw away your medicines at www.disposemymeds.org.          This list is accurate as of 8/23/18  2:35 PM.  Always use your most recent med list.                   Brand Name Dispense Instructions for use Diagnosis    cyanocobalamin 1000 MCG tablet    vitamin  B-12     Take by mouth daily        FOLIC ACID PO      Take 1 mg by mouth daily        KRILL OIL PO      Take 2 capsules by mouth daily        OMEGA-3 FATTY ACIDS PO      Take 1 capsule by mouth daily        prenatal multivitamin plus iron 27-0.8 MG Tabs per tablet     100 tablet    Take 1 tablet by mouth daily    Positive pregnancy test       VITAMIN D PO      Take 1,000 Units by mouth daily

## 2018-08-23 NOTE — NURSING NOTE
"Chief Complaint   Patient presents with     Prenatal Care     feeling more pressure        Initial /69 (BP Location: Right arm, Patient Position: Chair, Cuff Size: Adult Regular)  Pulse 85  Temp 97.4  F (36.3  C) (Tympanic)  Resp 16  Ht 5' 2.25\" (1.581 m)  Wt 174 lb 9.6 oz (79.2 kg)  LMP 12/05/2017  BMI 31.68 kg/m2 Estimated body mass index is 31.68 kg/(m^2) as calculated from the following:    Height as of this encounter: 5' 2.25\" (1.581 m).    Weight as of this encounter: 174 lb 9.6 oz (79.2 kg).  Medications and allergies reviewed.    Paul MELGAR, [place]    "

## 2018-08-30 ENCOUNTER — PRENATAL OFFICE VISIT (OUTPATIENT)
Dept: OBGYN | Facility: CLINIC | Age: 33
End: 2018-08-30
Payer: COMMERCIAL

## 2018-08-30 VITALS
BODY MASS INDEX: 31.93 KG/M2 | SYSTOLIC BLOOD PRESSURE: 130 MMHG | TEMPERATURE: 97.6 F | HEART RATE: 91 BPM | WEIGHT: 176 LBS | DIASTOLIC BLOOD PRESSURE: 65 MMHG

## 2018-08-30 DIAGNOSIS — Z34.83 ENCOUNTER FOR SUPERVISION OF OTHER NORMAL PREGNANCY IN THIRD TRIMESTER: Primary | ICD-10-CM

## 2018-08-30 PROCEDURE — 99207 ZZC PRENATAL VISIT: CPT | Performed by: OBSTETRICS & GYNECOLOGY

## 2018-08-30 NOTE — PROGRESS NOTES
Doing well.   Reports constant back discomfort that started yesterday  Denies VB, ctx, LOF. +FM  /65 (BP Location: Right arm, Patient Position: Chair, Cuff Size: Adult Regular)  Pulse 91  Temp 97.6  F (36.4  C) (Tympanic)  Wt 176 lb (79.8 kg)  LMP 2017  Breastfeeding? No  BMI 31.93 kg/m2  General Appearance: NAD  Abdomen: Gravid, NT  Refer to flow sheet above.   A/P: 33 year old  at 38w2d  -- labor precautions reviewed  RTC in 1 week    Kristel Jolly MD  [facility]

## 2018-08-30 NOTE — MR AVS SNAPSHOT
After Visit Summary   8/30/2018    Yvette Weiss    MRN: [id number]           Patient Information     Date Of Birth          1985        Visit Information        Provider Department      8/30/2018 2:00 PM Kristel Jolly MD [facility]        Today's Diagnoses     Encounter for supervision of other normal pregnancy in third trimester    -  1       Follow-ups after your visit        Your next 10 appointments already scheduled     Sep 06, 2018  2:15 PM CDT   ESTABLISHED PRENATAL with Jodi Yusuf MD   [facility]    [address] [address]   [phone]            Sep 13, 2018  1:45 PM CDT   ESTABLISHED PRENATAL with Cheng Sims MD   [facility]    [address] [address]   [phone]              Who to contact     If you have questions or need follow up information about today's clinic visit or your schedule please contact [facility] directly at [phone].  Normal or non-critical lab and imaging results will be communicated to you by MyChart, letter or phone within 4 business days after the clinic has received the results. If you do not hear from us within 7 days, please contact the clinic through ConsortiEXhart or phone. If you have a critical or abnormal lab result, we will notify you by phone as soon as possible.  Submit refill requests through GetGoing or call your pharmacy and they will forward the refill request to us. Please allow 3 business days for your refill to be completed.          Additional Information About Your Visit        MyChart Information     GetGoing gives you secure access to your electronic health record. If you see a primary care provider, you can also send messages to your care team and make appointments. If you have questions, please call your primary care clinic.  If you do not have a primary  care provider, please call [phone] and they will assist you.        Care EveryWhere ID     This is your Care EveryWhere ID. This could be used by other organizations to access your [place] medical records  [id number]        Your Vitals Were     Pulse Temperature Last Period Breastfeeding? BMI (Body Mass Index)       91 97.6  F (36.4  C) (Tympanic) 12/05/2017 No 31.93 kg/m2        Blood Pressure from Last 3 Encounters:   08/30/18 130/65   08/23/18 108/69   08/17/18 115/60    Weight from Last 3 Encounters:   08/30/18 176 lb (79.8 kg)   08/23/18 174 lb 9.6 oz (79.2 kg)   08/17/18 175 lb 6.4 oz (79.6 kg)              Today, you had the following     No orders found for display       Primary Care Provider Fax #    Physician No Ref-Primary [phone]       No address on file        Equal Access to Services     BLANCA HERNANDEZ : Hadii ruba Desouza, waaxda lumegan, qaybta kaalmada adeanthony, juan jose camejo . So [facility] [phone].    ATENCIÓN: Si habla español, tiene a nick disposición servicios gratuitos de asistencia lingüística. Llame al [phone].    We comply with applicable federal civil rights laws and [place] laws. We do not discriminate on the basis of race, color, national origin, age, disability, sex, sexual orientation, or gender identity.            Thank you!     Thank you for choosing [facility]  for your care. Our goal is always to provide you with excellent care. Hearing back from our patients is one way we can continue to improve our services. Please take a few minutes to complete the written survey that you may receive in the mail after your visit with us. Thank you!             Your Updated Medication List - Protect others around you: Learn how to safely use, store and throw away your medicines at www.disposemymeds.org.          This list is accurate as of 8/30/18  2:05 PM.  Always use your most recent med list.                   Brand Name  Dispense Instructions for use Diagnosis    cyanocobalamin 1000 MCG tablet    vitamin  B-12     Take by mouth daily        FOLIC ACID PO      Take 1 mg by mouth daily        KRILL OIL PO      Take 2 capsules by mouth daily        OMEGA-3 FATTY ACIDS PO      Take 1 capsule by mouth daily        prenatal multivitamin plus iron 27-0.8 MG Tabs per tablet     100 tablet    Take 1 tablet by mouth daily    Positive pregnancy test       VITAMIN D PO      Take 1,000 Units by mouth daily

## 2018-09-06 ENCOUNTER — PRENATAL OFFICE VISIT (OUTPATIENT)
Dept: OBGYN | Facility: CLINIC | Age: 33
End: 2018-09-06
Payer: COMMERCIAL

## 2018-09-06 VITALS
HEIGHT: 62 IN | RESPIRATION RATE: 16 BRPM | TEMPERATURE: 98.4 F | SYSTOLIC BLOOD PRESSURE: 111 MMHG | BODY MASS INDEX: 32.46 KG/M2 | WEIGHT: 176.4 LBS | DIASTOLIC BLOOD PRESSURE: 72 MMHG | HEART RATE: 76 BPM

## 2018-09-06 DIAGNOSIS — Z34.83 ENCOUNTER FOR SUPERVISION OF OTHER NORMAL PREGNANCY IN THIRD TRIMESTER: Primary | ICD-10-CM

## 2018-09-06 PROCEDURE — 99207 ZZC PRENATAL VISIT: CPT | Performed by: OBSTETRICS & GYNECOLOGY

## 2018-09-06 RX ORDER — ACETAMINOPHEN 325 MG/1
650 TABLET ORAL EVERY 4 HOURS PRN
Status: CANCELLED | OUTPATIENT
Start: 2018-09-06 | End: 2019-09-06

## 2018-09-06 RX ORDER — ONDANSETRON 2 MG/ML
4 INJECTION INTRAMUSCULAR; INTRAVENOUS EVERY 6 HOURS PRN
Status: CANCELLED | OUTPATIENT
Start: 2018-09-06 | End: 2019-09-06

## 2018-09-06 RX ORDER — SODIUM CHLORIDE, SODIUM LACTATE, POTASSIUM CHLORIDE, CALCIUM CHLORIDE 600; 310; 30; 20 MG/100ML; MG/100ML; MG/100ML; MG/100ML
INJECTION, SOLUTION INTRAVENOUS CONTINUOUS
Status: CANCELLED | OUTPATIENT
Start: 2018-09-06 | End: 2019-09-06

## 2018-09-06 RX ORDER — FENTANYL CITRATE 50 UG/ML
50-100 INJECTION, SOLUTION INTRAMUSCULAR; INTRAVENOUS
Status: CANCELLED | OUTPATIENT
Start: 2018-09-06 | End: 2019-09-06

## 2018-09-06 RX ORDER — NALOXONE HYDROCHLORIDE 0.4 MG/ML
.1-.4 INJECTION, SOLUTION INTRAMUSCULAR; INTRAVENOUS; SUBCUTANEOUS
Status: CANCELLED | OUTPATIENT
Start: 2018-09-06 | End: 2019-09-06

## 2018-09-06 RX ORDER — LIDOCAINE 40 MG/G
CREAM TOPICAL
Status: CANCELLED | OUTPATIENT
Start: 2018-09-06 | End: 2019-09-06

## 2018-09-06 RX ORDER — OXYTOCIN/0.9 % SODIUM CHLORIDE 30/500 ML
1-24 PLASTIC BAG, INJECTION (ML) INTRAVENOUS CONTINUOUS
Status: CANCELLED | OUTPATIENT
Start: 2018-09-06 | End: 2019-09-06

## 2018-09-06 RX ORDER — OXYTOCIN 10 [USP'U]/ML
10 INJECTION, SOLUTION INTRAMUSCULAR; INTRAVENOUS
Status: CANCELLED | OUTPATIENT
Start: 2018-09-06 | End: 2018-10-06

## 2018-09-06 RX ORDER — OXYTOCIN/0.9 % SODIUM CHLORIDE 30/500 ML
100-340 PLASTIC BAG, INJECTION (ML) INTRAVENOUS CONTINUOUS PRN
Status: CANCELLED | OUTPATIENT
Start: 2018-09-06 | End: 2018-10-06

## 2018-09-06 NOTE — MR AVS SNAPSHOT
After Visit Summary   9/6/2018    Yvette Weiss    MRN: [id number]           Patient Information     Date Of Birth          1985        Visit Information        Provider Department      9/6/2018 2:15 PM Jodi Yusuf MD [facility]        Today's Diagnoses     Encounter for supervision of other normal pregnancy in third trimester    -  1       Follow-ups after your visit        Follow-up notes from your care team     Return in about 1 week (around 9/13/2018).      Your next 10 appointments already scheduled     Sep 13, 2018  1:45 PM CDT   ESTABLISHED PRENATAL with Cheng Sims MD   [facility]    [address] [address] [phone]              Who to contact     If you have questions or need follow up information about today's clinic visit or your schedule please contact [facility] directly at [phone].  Normal or non-critical lab and imaging results will be communicated to you by MyChart, letter or phone within 4 business days after the clinic has received the results. If you do not hear from us within 7 days, please contact the clinic through MDCapsulehart or phone. If you have a critical or abnormal lab result, we will notify you by phone as soon as possible.  Submit refill requests through [employer] Eight or call your pharmacy and they will forward the refill request to us. Please allow 3 business days for your refill to be completed.          Additional Information About Your Visit        MyChart Information     [employer] Eight gives you secure access to your electronic health record. If you see a primary care provider, you can also send messages to your care team and make appointments. If you have questions, please call your primary care clinic.  If you do not have a primary care provider, please call [phone] and they will assist you.        Care EveryWhere ID     This is your Care EveryWhere  "ID. This could be used by other organizations to access your [place] medical records  [id number]        Your Vitals Were     Pulse Temperature Respirations Height Last Period Breastfeeding?    76 98.4  F (36.9  C) (Tympanic) 16 5' 2.25\" (1.581 m) 12/05/2017 No    BMI (Body Mass Index)                   32.01 kg/m2            Blood Pressure from Last 3 Encounters:   09/06/18 111/72   08/30/18 130/65   08/23/18 108/69    Weight from Last 3 Encounters:   09/06/18 176 lb 6.4 oz (80 kg)   08/30/18 176 lb (79.8 kg)   08/23/18 174 lb 9.6 oz (79.2 kg)              Today, you had the following     No orders found for display       Primary Care Provider Fax #    Physician No Ref-Primary [phone]       No address on file        Equal Access to Services     LAWANDA HERNANDEZ : Hadii ruba Desouza, wacassie brock, wilmer kaalmada thaddeus, juan jose camejo . So [facility] [phone].    ATENCIÓN: Si habla español, tiene a nick disposición servicios gratuitos de asistencia lingüística. Llame al [phone].    We comply with applicable federal civil rights laws and [place] laws. We do not discriminate on the basis of race, color, national origin, age, disability, sex, sexual orientation, or gender identity.            Thank you!     Thank you for choosing [facility]  for your care. Our goal is always to provide you with excellent care. Hearing back from our patients is one way we can continue to improve our services. Please take a few minutes to complete the written survey that you may receive in the mail after your visit with us. Thank you!             Your Updated Medication List - Protect others around you: Learn how to safely use, store and throw away your medicines at www.disposemymeds.org.          This list is accurate as of 9/6/18  2:33 PM.  Always use your most recent med list.                   Brand Name Dispense Instructions for use Diagnosis    cyanocobalamin 1000 MCG " tablet    vitamin  B-12     Take by mouth daily        FOLIC ACID PO      Take 1 mg by mouth daily        KRILL OIL PO      Take 2 capsules by mouth daily        OMEGA-3 FATTY ACIDS PO      Take 1 capsule by mouth daily        prenatal multivitamin plus iron 27-0.8 MG Tabs per tablet     100 tablet    Take 1 tablet by mouth daily    Positive pregnancy test       VITAMIN D PO      Take 1,000 Units by mouth daily

## 2018-09-06 NOTE — NURSING NOTE
"Initial /72 (BP Location: Right arm, Patient Position: Sitting, Cuff Size: Adult Regular)  Pulse 76  Temp 98.4  F (36.9  C) (Tympanic)  Resp 16  Ht 5' 2.25\" (1.581 m)  Wt 176 lb 6.4 oz (80 kg)  LMP 12/05/2017  Breastfeeding? No  BMI 32.01 kg/m2 Estimated body mass index is 32.01 kg/(m^2) as calculated from the following:    Height as of this encounter: 5' 2.25\" (1.581 m).    Weight as of this encounter: 176 lb 6.4 oz (80 kg). .    Suze Romero, [facility]    "

## 2018-09-06 NOTE — PROGRESS NOTES
Doing well, starting to get uncomfortable though.  +FM, rare bouts of ctx, no VB or LOF.    33 year old  at 39w2d   - discussed IOL after 41 weeks for post-dates or by maternal request with a favorable cervix after 39 weeks.  She desires IOL after 41 weeks or hopefully spontaneous labor before, but had to be induced twice before.  Scheduled  at 0700.  Discussed with Yvette Weiss, the following; indications; the agents and methods of labor augmentation, including risks, benefits, and alternative approaches; and the possible need for  birth. EFW is AGA.  The Labor Induction:what you need to know information sheet was made available to her. Questions and concerns were addressed and patient agrees to above if necessary during the course of her labor.      Jodi Yusuf MD, MPH  [facility] OB/[facility]yn

## 2018-09-13 ENCOUNTER — PRENATAL OFFICE VISIT (OUTPATIENT)
Dept: OBGYN | Facility: CLINIC | Age: 33
End: 2018-09-13
Payer: COMMERCIAL

## 2018-09-13 VITALS
RESPIRATION RATE: 16 BRPM | BODY MASS INDEX: 32.39 KG/M2 | HEIGHT: 62 IN | SYSTOLIC BLOOD PRESSURE: 127 MMHG | DIASTOLIC BLOOD PRESSURE: 64 MMHG | HEART RATE: 76 BPM | WEIGHT: 176 LBS | TEMPERATURE: 97.1 F

## 2018-09-13 DIAGNOSIS — Z34.80 PRENATAL CARE, SUBSEQUENT PREGNANCY, UNSPECIFIED TRIMESTER: Primary | ICD-10-CM

## 2018-09-13 PROCEDURE — 99207 ZZC PRENATAL VISIT: CPT | Performed by: OBSTETRICS & GYNECOLOGY

## 2018-09-13 PROCEDURE — 59025 FETAL NON-STRESS TEST: CPT | Performed by: OBSTETRICS & GYNECOLOGY

## 2018-09-13 NOTE — PROGRESS NOTES
Concerns:   Doing well.  No concerns today.  No vaginal bleeding, LOF, contractions.  No HA, RUQ pain, N/V, visual changes.  Cervix is unchanged from previous exam.  Cephalic position confirmed by Leopold maneuvers and cervical exam.  Discussed indications, risks, complications and failure rate of inductions.  NST done today and was reactive.  Labor precautions discussed.  Prenatal flowsheet information is reviewed.  RTC IOL on 9/18/2018    Cheng Sims MD

## 2018-09-13 NOTE — MR AVS SNAPSHOT
"              After Visit Summary   9/13/2018    Yvette Weiss    MRN: [id number]           Patient Information     Date Of Birth          1985        Visit Information        Provider Department      9/13/2018 1:45 PM Cheng Sims MD [facility]        Today's Diagnoses     Prenatal care, subsequent pregnancy, unspecified trimester    -  1       Follow-ups after your visit        Follow-up notes from your care team     Return in about 5 days (around 9/18/2018) for IOL.      Who to contact     If you have questions or need follow up information about today's clinic visit or your schedule please contact [facility] directly at [phone].  Normal or non-critical lab and imaging results will be communicated to you by MyChart, letter or phone within 4 business days after the clinic has received the results. If you do not hear from us within 7 days, please contact the clinic through Openbayhart or phone. If you have a critical or abnormal lab result, we will notify you by phone as soon as possible.  Submit refill requests through Sorbisense or call your pharmacy and they will forward the refill request to us. Please allow 3 business days for your refill to be completed.          Additional Information About Your Visit        MyChart Information     S[employer] gives you secure access to your electronic health record. If you see a primary care provider, you can also send messages to your care team and make appointments. If you have questions, please call your primary care clinic.  If you do not have a primary care provider, please call [phone] and they will assist you.        Care EveryWhere ID     This is your Care EveryWhere ID. This could be used by other organizations to access your [place] medical records  [id number]        Your Vitals Were     Pulse Temperature Respirations Height Last Period BMI (Body Mass Index)    76 97.1  F (36.2  C) 16 5' 2.25\" (1.581 m) 12/05/2017 31.93 " kg/m2       Blood Pressure from Last 3 Encounters:   09/13/18 127/64   09/06/18 111/72   08/30/18 130/65    Weight from Last 3 Encounters:   09/13/18 176 lb (79.8 kg)   09/06/18 176 lb 6.4 oz (80 kg)   08/30/18 176 lb (79.8 kg)              We Performed the Following     FETAL NON-STRESS TEST        Primary Care Provider Fax #    Physician No Ref-Primary [phone]       No address on file        Equal Access to Services     BLANCA HERNANDEZ : Hadlex floreso [place], waaxda luqadaha, qaybta kaalmada adeanelyada, juan jose camejo . So [facility] [phone].    ATENCIÓN: Si habla español, tiene a nick disposición servicios gratuitos de asistencia lingüística. Llame al [phone].    We comply with applicable federal civil rights laws and [place] laws. We do not discriminate on the basis of race, color, national origin, age, disability, sex, sexual orientation, or gender identity.            Thank you!     Thank you for choosing [facility]  for your care. Our goal is always to provide you with excellent care. Hearing back from our patients is one way we can continue to improve our services. Please take a few minutes to complete the written survey that you may receive in the mail after your visit with us. Thank you!             Your Updated Medication List - Protect others around you: Learn how to safely use, store and throw away your medicines at www.disposemymeds.org.          This list is accurate as of 9/13/18  2:11 PM.  Always use your most recent med list.                   Brand Name Dispense Instructions for use Diagnosis    cyanocobalamin 1000 MCG tablet    vitamin  B-12     Take by mouth daily        FOLIC ACID PO      Take 1 mg by mouth daily        KRILL OIL PO      Take 2 capsules by mouth daily        OMEGA-3 FATTY ACIDS PO      Take 1 capsule by mouth daily        prenatal multivitamin plus iron 27-0.8 MG Tabs per tablet     100 tablet    Take 1 tablet by mouth daily     Positive pregnancy test       VITAMIN D PO      Take 1,000 Units by mouth daily

## 2018-09-15 ENCOUNTER — ANESTHESIA (OUTPATIENT)
Dept: OBGYN | Facility: CLINIC | Age: 33
End: 2018-09-15
Payer: COMMERCIAL

## 2018-09-15 ENCOUNTER — ANESTHESIA EVENT (OUTPATIENT)
Dept: OBGYN | Facility: CLINIC | Age: 33
End: 2018-09-15
Payer: COMMERCIAL

## 2018-09-15 ENCOUNTER — HOSPITAL ENCOUNTER (INPATIENT)
Facility: CLINIC | Age: 33
LOS: 2 days | Discharge: HOME | End: 2018-09-17
Attending: OBSTETRICS & GYNECOLOGY | Admitting: OBSTETRICS & GYNECOLOGY
Payer: COMMERCIAL

## 2018-09-15 PROBLEM — Z34.01 ENCOUNTER FOR SUPERVISION OF NORMAL FIRST PREGNANCY IN FIRST TRIMESTER: Status: ACTIVE | Noted: 2018-09-15

## 2018-09-15 LAB
ABO + RH BLD: NORMAL
ABO + RH BLD: NORMAL
RUPTURE OF FETAL MEMBRANES BY ROM PLUS: POSITIVE
SPECIMEN EXP DATE BLD: NORMAL

## 2018-09-15 PROCEDURE — 12000031 ZZH R&B OB CRITICAL

## 2018-09-15 PROCEDURE — 25000125 ZZHC RX 250: Performed by: OBSTETRICS & GYNECOLOGY

## 2018-09-15 PROCEDURE — 84112 EVAL AMNIOTIC FLUID PROTEIN: CPT | Performed by: OBSTETRICS & GYNECOLOGY

## 2018-09-15 PROCEDURE — 86900 BLOOD TYPING SEROLOGIC ABO: CPT | Performed by: OBSTETRICS & GYNECOLOGY

## 2018-09-15 PROCEDURE — 25000125 ZZHC RX 250: Performed by: NURSE ANESTHETIST, CERTIFIED REGISTERED

## 2018-09-15 PROCEDURE — 37000011 ZZH ANESTHESIA WARD SERVICE

## 2018-09-15 PROCEDURE — 40000671 ZZH STATISTIC ANESTHESIA CASE

## 2018-09-15 PROCEDURE — 86901 BLOOD TYPING SEROLOGIC RH(D): CPT | Performed by: OBSTETRICS & GYNECOLOGY

## 2018-09-15 PROCEDURE — 25000128 H RX IP 250 OP 636: Performed by: OBSTETRICS & GYNECOLOGY

## 2018-09-15 PROCEDURE — 86780 TREPONEMA PALLIDUM: CPT | Performed by: OBSTETRICS & GYNECOLOGY

## 2018-09-15 PROCEDURE — 36415 COLL VENOUS BLD VENIPUNCTURE: CPT | Performed by: OBSTETRICS & GYNECOLOGY

## 2018-09-15 PROCEDURE — 37000011 ZZH ANESTHESIA WARD SERVICE: Performed by: NURSE ANESTHETIST, CERTIFIED REGISTERED

## 2018-09-15 RX ORDER — OXYTOCIN/0.9 % SODIUM CHLORIDE 30/500 ML
100-340 PLASTIC BAG, INJECTION (ML) INTRAVENOUS CONTINUOUS PRN
Status: DISCONTINUED | OUTPATIENT
Start: 2018-09-15 | End: 2018-09-16

## 2018-09-15 RX ORDER — LIDOCAINE 40 MG/G
CREAM TOPICAL
Status: DISCONTINUED | OUTPATIENT
Start: 2018-09-15 | End: 2018-09-16

## 2018-09-15 RX ORDER — FENTANYL CITRATE 50 UG/ML
50-100 INJECTION, SOLUTION INTRAMUSCULAR; INTRAVENOUS
Status: DISCONTINUED | OUTPATIENT
Start: 2018-09-15 | End: 2018-09-16

## 2018-09-15 RX ORDER — ONDANSETRON 2 MG/ML
4 INJECTION INTRAMUSCULAR; INTRAVENOUS EVERY 6 HOURS PRN
Status: DISCONTINUED | OUTPATIENT
Start: 2018-09-15 | End: 2018-09-16

## 2018-09-15 RX ORDER — SODIUM CHLORIDE, SODIUM LACTATE, POTASSIUM CHLORIDE, CALCIUM CHLORIDE 600; 310; 30; 20 MG/100ML; MG/100ML; MG/100ML; MG/100ML
INJECTION, SOLUTION INTRAVENOUS CONTINUOUS
Status: DISCONTINUED | OUTPATIENT
Start: 2018-09-15 | End: 2018-09-16

## 2018-09-15 RX ORDER — FENTANYL CITRATE 50 UG/ML
100 INJECTION, SOLUTION INTRAMUSCULAR; INTRAVENOUS ONCE
Status: DISCONTINUED | OUTPATIENT
Start: 2018-09-15 | End: 2018-09-16

## 2018-09-15 RX ORDER — OXYTOCIN/0.9 % SODIUM CHLORIDE 30/500 ML
1-24 PLASTIC BAG, INJECTION (ML) INTRAVENOUS CONTINUOUS
Status: DISCONTINUED | OUTPATIENT
Start: 2018-09-15 | End: 2018-09-16

## 2018-09-15 RX ORDER — ACETAMINOPHEN 325 MG/1
650 TABLET ORAL EVERY 4 HOURS PRN
Status: DISCONTINUED | OUTPATIENT
Start: 2018-09-15 | End: 2018-09-16

## 2018-09-15 RX ORDER — OXYTOCIN 10 [USP'U]/ML
10 INJECTION, SOLUTION INTRAMUSCULAR; INTRAVENOUS
Status: DISCONTINUED | OUTPATIENT
Start: 2018-09-15 | End: 2018-09-16

## 2018-09-15 RX ORDER — EPHEDRINE SULFATE 50 MG/ML
5 INJECTION, SOLUTION INTRAMUSCULAR; INTRAVENOUS; SUBCUTANEOUS
Status: DISCONTINUED | OUTPATIENT
Start: 2018-09-15 | End: 2018-09-16

## 2018-09-15 RX ORDER — NALOXONE HYDROCHLORIDE 0.4 MG/ML
.1-.4 INJECTION, SOLUTION INTRAMUSCULAR; INTRAVENOUS; SUBCUTANEOUS
Status: DISCONTINUED | OUTPATIENT
Start: 2018-09-15 | End: 2018-09-16

## 2018-09-15 RX ORDER — OXYCODONE AND ACETAMINOPHEN 5; 325 MG/1; MG/1
1 TABLET ORAL
Status: DISCONTINUED | OUTPATIENT
Start: 2018-09-15 | End: 2018-09-16

## 2018-09-15 RX ORDER — IBUPROFEN 800 MG/1
800 TABLET, FILM COATED ORAL
Status: DISCONTINUED | OUTPATIENT
Start: 2018-09-15 | End: 2018-09-16

## 2018-09-15 RX ORDER — TERBUTALINE SULFATE 1 MG/ML
0.25 INJECTION, SOLUTION SUBCUTANEOUS
Status: DISCONTINUED | OUTPATIENT
Start: 2018-09-15 | End: 2018-09-16

## 2018-09-15 RX ORDER — CARBOPROST TROMETHAMINE 250 UG/ML
250 INJECTION, SOLUTION INTRAMUSCULAR
Status: DISCONTINUED | OUTPATIENT
Start: 2018-09-15 | End: 2018-09-16

## 2018-09-15 RX ORDER — METHYLERGONOVINE MALEATE 0.2 MG/ML
200 INJECTION INTRAVENOUS
Status: DISCONTINUED | OUTPATIENT
Start: 2018-09-15 | End: 2018-09-16

## 2018-09-15 RX ORDER — ONDANSETRON 4 MG/1
4 TABLET, ORALLY DISINTEGRATING ORAL EVERY 6 HOURS PRN
Status: DISCONTINUED | OUTPATIENT
Start: 2018-09-15 | End: 2018-09-16

## 2018-09-15 RX ADMIN — OXYTOCIN-SODIUM CHLORIDE 0.9% IV SOLN 30 UNIT/500ML 2 MILLI-UNITS/MIN: 30-0.9/5 SOLUTION at 20:39

## 2018-09-15 RX ADMIN — SODIUM CHLORIDE, POTASSIUM CHLORIDE, SODIUM LACTATE AND CALCIUM CHLORIDE 500 ML: 600; 310; 30; 20 INJECTION, SOLUTION INTRAVENOUS at 16:38

## 2018-09-15 RX ADMIN — SODIUM CHLORIDE, POTASSIUM CHLORIDE, SODIUM LACTATE AND CALCIUM CHLORIDE: 600; 310; 30; 20 INJECTION, SOLUTION INTRAVENOUS at 20:41

## 2018-09-15 RX ADMIN — LIDOCAINE HYDROCHLORIDE 10 MG: 10 INJECTION, SOLUTION INFILTRATION; PERINEURAL at 23:51

## 2018-09-15 NOTE — PROGRESS NOTES
S:Patient presents due to possible fluid leaking and bloody show .  B:40w4d   Allergies: Latex  A:Vital Signs  Temp: 98.3  F (36.8  C)  Temp src: Oral  Resp: 16  Pulse: 83  BP: 115/65  Cervical Exam  Dilation: 3  Station: -2  Fetal Presentation: Cephalic  FHR  Monitor: External US  Variability: Moderate  Baseline Rate (Fetus A): 140 bpm  Baseline Classification: Normal  Accelerations: Present  Decelerations: None  FHTs are category 1.  Uterine Activity  Monitor: Bear  Contraction Frequency (minutes): 6  Contraction Duration (seconds): 60  Contraction Quality: Mild  Resting Tone Palpated: Soft     No visits with results within 1 Day(s) from this visit.  Latest known visit with results is:    Prenatal Office Visit on 2018   Component Date Value     Group B Strep PCR Spec D* 2018 Vaginal Rectal      Group B Strep PCR 2018 Negative      Dr. TIFFANI Jolly called and orders received.  Plan includes; Monitor, observe and reevaluate and Lab for ROM . Reviewed with patient and she agrees with plan.   HC Your Rights handout : Informed and given    Prenatal Breastfeeding Education Toolkit provided for patient to review,helping her to make an informed decision on a feeding choice for her baby. Patient declined. Questions answered.    Oriented to room and call light.

## 2018-09-15 NOTE — IP AVS SNAPSHOT
MRN:[id number]                      After Visit Summary   9/15/2018    Yvette Weiss    MRN: [id number]           Thank you!     Thank you for choosing [facility] for your care. Our goal is always to provide you with excellent care. Hearing back from our patients is one way we can continue to improve our services. Please take a few minutes to complete the written survey that you may receive in the mail after you visit with us. Thank you!        Patient Information     Date Of Birth          1985        Designated Caregiver       Most Recent Value    Caregiver    Will someone help with your care after discharge? yes    Name of designated caregiver Wesly    Phone number of caregiver [phone]    Caregiver address same      About your hospital stay     You were admitted on:  September 15, 2018 You last received care in the:  [facility]    You were discharged on:  September 17, 2018       Who to Call     For medical emergencies, please call 911.  For non-urgent questions about your medical care, please call your primary care provider or clinic, None          Attending Provider     Provider Specialty    Kristel Jolly MD OB/Gyn    [facility], Ilsa Ceballos MD OB/Gyn       Primary Care Provider Fax #    Physician No Ref-Primary [phone]      Further instructions from your care team       Postpartum Vaginal Delivery Instructions        Discharge Instructions and Follow-Up:   Discharge diet: Regular   Discharge activity: Activity as tolerated   Discharge follow-up: Follow up with OB clinic in 6 weeks   Wound care: Drink plenty of fluids  Ice to area for comfort         -    Activity       Ask family and friends for help when you need it.    Do not place anything in your vagina for 6 weeks.    You are not restricted on other activities, but take it easy for a few weeks to allow your body to recover from delivery.  You are able to do any activities you feel up to that  point.    No driving until you have stopped taking your pain medications (usually two weeks after delivery).     Call your health care provider if you have any of these symptoms:       Increased pain, swelling, redness, or fluid around your stiches from an episiotomy or perineal tear.    A fever above 100.4 F (38 C) with or without chills when placing a thermometer under your tongue.    You soak a sanitary pad with blood within 1 hour, or you see blood clots larger than a golf ball.    Bleeding that lasts more than 6 weeks.    Vaginal discharge that smells bad.    Severe pain, cramping or tenderness in your lower belly area.    A need to urinate more frequently (use the toilet more often), more urgently (use the toilet very quickly), or it burns when you urinate.    Nausea and vomiting.    Redness, swelling or pain around a vein in your leg.    Problems breastfeeding or a red or painful area on your breast.    Chest pain and cough or are gasping for air.    Problems coping with sadness, anxiety, or depression.  If you have any concerns about hurting yourself or the baby, call your provider immediately.     You have questions or concerns after you return home.     Keep your hands clean:  Always wash your hands before touching your perineal area and stitches.  This helps reduce your risk of infection.  If your hands aren't dirty, you may use an alcohol hand-rub to clean your hands. Keep your nails clean and short.        Pending Results     No orders found from 9/13/2018 to 9/16/2018.            Statement of Approval     Ordered          09/17/18 1202  I have reviewed and agree with all the recommendations and orders detailed in this document.  EFFECTIVE NOW     Approved and electronically signed by:  Jolynn Cha MD             Admission Information     Date & Time Provider Department Dept. Phone    9/15/2018 Ilsa Wilson MD [facility] [phone]      Your Vitals Were     Blood  Pressure Pulse Temperature Respirations Last Period Pulse Oximetry    109/63 69 97.9  F (36.6  C) (Oral) 16 12/05/2017 98%      [facility] [place] Information     Fetch It gives you secure access to your electronic health record. If you see a primary care provider, you can also send messages to your care team and make appointments. If you have questions, please call your primary care clinic.  If you do not have a primary care provider, please call [phone] and they will assist you.        Care EveryWhere ID     This is your Care EveryWhere ID. This could be used by other organizations to access your [place] medical records  [id number]        Equal Access to Services     [facility] : Kwame Desouza, bozena brock, wilmer travis, juan jose camejo . So [facility] [phone].    ATENCIÓN: Si habla español, tiene a nick disposición servicios gratuitos de asistencia lingüística. Llame al [phone].    We comply with applicable federal civil rights laws and [place] laws. We do not discriminate on the basis of race, color, national origin, age, disability, sex, sexual orientation, or gender identity.               Review of your medicines      CONTINUE these medicines which have NOT CHANGED        Dose / Directions    cyanocobalamin 1000 MCG tablet   Commonly known as:  vitamin  B-12        Take by mouth daily   Refills:  0       FOLIC ACID PO        Dose:  1 mg   Take 1 mg by mouth daily   Refills:  0       KRILL OIL PO        Dose:  2 capsule   Take 2 capsules by mouth daily   Refills:  0       OMEGA-3 FATTY ACIDS PO        Dose:  1 capsule   Take 1 capsule by mouth daily   Refills:  0       prenatal multivitamin plus iron 27-0.8 MG Tabs per tablet   Used for:  Positive pregnancy test        Dose:  1 tablet   Take 1 tablet by mouth daily   Quantity:  100 tablet   Refills:  0       VITAMIN D PO        Dose:  1000 Units   Take 1,000 Units by mouth daily   Refills:  0                 Protect others around you: Learn how to safely use, store and throw away your medicines at www.disposemymeds.org.             Medication List: This is a list of all your medications and when to take them. Check marks below indicate your daily home schedule. Keep this list as a reference.      Medications           Morning Afternoon Evening Bedtime As Needed    cyanocobalamin 1000 MCG tablet   Commonly known as:  vitamin  B-12   Take by mouth daily                                FOLIC ACID PO   Take 1 mg by mouth daily                                KRILL OIL PO   Take 2 capsules by mouth daily                                OMEGA-3 FATTY ACIDS PO   Take 1 capsule by mouth daily                                prenatal multivitamin plus iron 27-0.8 MG Tabs per tablet   Take 1 tablet by mouth daily                                VITAMIN D PO   Take 1,000 Units by mouth daily

## 2018-09-15 NOTE — IP AVS SNAPSHOT
[facility]    [address] [address]    Phone:  [phone]    Fax:  [phone]                                       After Visit Summary   9/15/2018    Yvette Weiss    MRN: [id number]           After Visit Summary Signature Page     I have received my discharge instructions, and my questions have been answered. I have discussed any challenges I see with this plan with the nurse or doctor.    ..........................................................................................................................................  Patient/Patient Representative Signature      ..........................................................................................................................................  Patient Representative Print Name and Relationship to Patient    ..................................................               ................................................  Date                                   Time    ..........................................................................................................................................  Reviewed by Signature/Title    ...................................................              ..............................................  Date                                               Time          22EPIC Rev 08/18

## 2018-09-15 NOTE — PLAN OF CARE
Problem: Labor (Cervical Ripen, Induct, Augment) (Adult,Obstetrics,Pediatric)  Goal: Signs and Symptoms of Listed Potential Problems Will be Absent, Minimized or Managed (Labor)  Signs and symptoms of listed potential problems will be absent, minimized or managed by discharge/transition of care (reference Labor (Cervical Ripen, Induct, Augment) (Adult,Obstetrics,Pediatric) CPG).  ROM positive.  Dr Jolly notified, will admit for labor.  Patient oriented to plan of care. , cat. 1, NST reactive.  Ctx 6-9 minutes, patient appears comfortable, encouraged to be up on birthing ball and to ambulate.

## 2018-09-15 NOTE — H&P
L&D History and Physical   September 15, 2018  Yvette Weiss  [id number]      HPI: Yvette Weiss is a 33 year old  at 40w4d by LMP c/w 8w3d US, here for blood vaginal discharge/show that occurred at 1200, 9/15.     She states that she is feeling well today.  She denies fever, HA, blurred vision, Nausea, vomiting, CP, SOB, abdominal pain, constipation, diarrhea, LOF, abnormal vaginal discharge, and acute swelling.  +FM.      Complications of Pregnancy:   Patient Active Problem List   Diagnosis     CARDIOVASCULAR SCREENING; LDL GOAL LESS THAN 160     24 hour clinic contact list given     Encounter for supervision of other normal pregnancy     Encounter for supervision of normal first pregnancy in first trimester     OBHX:   Obstetric History       T2      L2     SAB0   TAB0   Ectopic0   Multiple0   Live Births2       # Outcome Date GA Lbr Yaron/2nd Weight Sex Delivery Anes PTL Lv   3 Current            2 Term 16 41w0d 03:40 / 00:04 4.423 kg (9 lb 12 oz) F Vag-Spont IV REGIONAL N TRUDY      Apgar1:  7                Apgar5: 9   1 Term 14 40w6d 17:50 / 01:06 3.742 kg (8 lb 4 oz) M Vag-Spont EPI N TRUDY      Name: Suresh      Apgar1:  8                Apgar5: 9          MedicalHX:   Past Medical History:   Diagnosis Date     Anxiety     as teenager     ASCUS on Pap smear     neg for High Risk HPV     Chickenpox     x2     Depression     as teenager     Gestational diabetes 2016     Lateral epicondylitis 2004     Postpartum depression 2014    mild       SurgicalHX:   Past Surgical History:   Procedure Laterality Date     ORTHOPEDIC SURGERY      ORIF left wrist       Medications:     No current facility-administered medications on file prior to encounter.   Current Outpatient Prescriptions on File Prior to Encounter:  Cholecalciferol (VITAMIN D PO) Take 1,000 Units by mouth daily    cyanocobalamin (VITAMIN  B-12) 1000 MCG tablet Take by mouth daily   FOLIC ACID PO Take 1 mg by  mouth daily   KRILL OIL PO Take 2 capsules by mouth daily    Prenatal Vit-Fe Fumarate-FA (PRENATAL MULTIVITAMIN  PLUS IRON) 27-0.8 MG TABS Take 1 tablet by mouth daily   OMEGA-3 FATTY ACIDS PO Take 1 capsule by mouth daily        Allergies:  Allergies   Allergen Reactions     Latex Hives       FamilyHX:  Family History   Problem Relation Age of Onset     Adopted: Yes     Alcohol/Drug Mother      A&D     Alcohol/Drug Father      A&D     Parkinsonism Paternal Grandmother      Cerebrovascular Disease Maternal Grandmother      x3     Other - See Comments Sister      fetal alcohol syndrome     Cancer Maternal Grandfather      lung     Other Cancer Paternal Grandfather      Lung       SocialHX:   Social History     Social History     Marital status:      Spouse name: Wesly     Number of children: 1     Years of education: N/A     Social History Main Topics     Smoking status: Former Smoker     Quit date: 1/20/2010     Smokeless tobacco: Never Used     Alcohol use Yes      Comment: occas- quit with pregnancy     Drug use: No     Sexual activity: Yes     Partners: Male     Other Topics Concern     Parent/Sibling W/ Cabg, Mi Or Angioplasty Before 65f 55m? No     Adopted     Social History Narrative       ROS: 10-point ROS negative except as in HPI     Physical Exam:  Vitals:    09/15/18 1230   BP: 115/65   Pulse: 83   Resp: 16   Temp: 98.3  F (36.8  C)   TempSrc: Oral     GEN: resting comfortably in bed, in NAD   CVS: RRR, no murmur appreciated   PULMONARY: CTAB, no increased work of breathing, no cough/wheeze   ABDOMEN: soft, gravid, non-tender, non-distended  EXTREMITIES: trace edema, non tender to palpation  CVX: 3/60/-2  Presentation: cephalic by cervical  EFW: AGA    NST:  FHT: baseline 140, moderate variability, accelerations present, no decelerations  TOCO: q6 minutes      Ultrasounds:  Reviewed in Chart    Labs:   Results for orders placed or performed during the hospital encounter of 09/15/18 (from the past  24 hour(s))   Rupture of Fetal Membranes by ROM Plus   Result Value Ref Range    Rupture of Fetal Membranes by ROM Plus Positive (A) NEG^Negative       Lab Results   Component Value Date    ABO B 2018    RH Pos 2018    AS Neg 2018    HEPBANG Nonreactive 2018    CHPCRT  2015     Negative   Negative for C. trachomatis rRNA by transcription mediated amplification.   A negative result by transcription mediated amplification does not preclude the   presence of C. trachomatis infection because results are dependent on proper   and adequate collection, absence of inhibitors, and sufficient rRNA to be   detected.      GCPCRT  2015     Negative   Negative for N. gonorrhoeae rRNA by transcription mediated amplification.   A negative result by transcription mediated amplification does not preclude the   presence of N. gonorrhoeae infection because results are dependent on proper   and adequate collection, absence of inhibitors, and sufficient rRNA to be   detected.      TREPAB Negative 2018    RUBELLAABIGG 47 2013    HGB 11.8 2018    HIV Negative 2013       GBS Status:   Lab Results   Component Value Date    GBS Negative 2018       Lab Results   Component Value Date    PAP NIL 2017       A/P: Yvetet Weiss is a 33 year old female  at 40w4d by LMP c/w 8w3d US, here for spontaneous rupture of membranes    Admit for: SROM; obtain routine labs  FHT: Category 1 tracing  GBS status: Negative  Pain management: Per patient request and to be provided by anesthesia staff    Kristel Jolly MD  [facility]

## 2018-09-15 NOTE — PLAN OF CARE
Problem: Labor (Cervical Ripen, Induct, Augment) (Adult,Obstetrics,Pediatric)  Goal: Signs and Symptoms of Listed Potential Problems Will be Absent, Minimized or Managed (Labor)  Signs and symptoms of listed potential problems will be absent, minimized or managed by discharge/transition of care (reference Labor (Cervical Ripen, Induct, Augment) (Adult,Obstetrics,Pediatric) CPG).   Outcome: Improving  Patient up in room and on birthing ball.  , cat.1.  Patient states ctx are starting to feel stronger. Dr. Jolly updated.  Will continue to assess.

## 2018-09-16 LAB — T PALLIDUM AB SER QL: NONREACTIVE

## 2018-09-16 PROCEDURE — 25000125 ZZHC RX 250: Performed by: NURSE ANESTHETIST, CERTIFIED REGISTERED

## 2018-09-16 PROCEDURE — 25000125 ZZHC RX 250: Performed by: OBSTETRICS & GYNECOLOGY

## 2018-09-16 PROCEDURE — 25000128 H RX IP 250 OP 636: Performed by: NURSE ANESTHETIST, CERTIFIED REGISTERED

## 2018-09-16 PROCEDURE — 00HU33Z INSERTION OF INFUSION DEVICE INTO SPINAL CANAL, PERCUTANEOUS APPROACH: ICD-10-PCS | Performed by: NURSE ANESTHETIST, CERTIFIED REGISTERED

## 2018-09-16 PROCEDURE — 72200001 ZZH LABOR CARE VAGINAL DELIVERY SINGLE

## 2018-09-16 PROCEDURE — 12000031 ZZH R&B OB CRITICAL

## 2018-09-16 PROCEDURE — 0KQM0ZZ REPAIR PERINEUM MUSCLE, OPEN APPROACH: ICD-10-PCS | Performed by: OBSTETRICS & GYNECOLOGY

## 2018-09-16 PROCEDURE — 3E0R3BZ INTRODUCTION OF ANESTHETIC AGENT INTO SPINAL CANAL, PERCUTANEOUS APPROACH: ICD-10-PCS | Performed by: NURSE ANESTHETIST, CERTIFIED REGISTERED

## 2018-09-16 PROCEDURE — 59400 OBSTETRICAL CARE: CPT | Performed by: OBSTETRICS & GYNECOLOGY

## 2018-09-16 PROCEDURE — 25000128 H RX IP 250 OP 636: Performed by: OBSTETRICS & GYNECOLOGY

## 2018-09-16 PROCEDURE — 25000132 ZZH RX MED GY IP 250 OP 250 PS 637: Performed by: OBSTETRICS & GYNECOLOGY

## 2018-09-16 RX ORDER — NALOXONE HYDROCHLORIDE 0.4 MG/ML
.1-.4 INJECTION, SOLUTION INTRAMUSCULAR; INTRAVENOUS; SUBCUTANEOUS
Status: DISCONTINUED | OUTPATIENT
Start: 2018-09-16 | End: 2018-09-17 | Stop reason: HOSPADM

## 2018-09-16 RX ORDER — METHYLERGONOVINE MALEATE 0.2 MG/ML
200 INJECTION INTRAVENOUS
Status: DISCONTINUED | OUTPATIENT
Start: 2018-09-16 | End: 2018-09-17 | Stop reason: HOSPADM

## 2018-09-16 RX ORDER — AMOXICILLIN 250 MG
2 CAPSULE ORAL 2 TIMES DAILY
Status: DISCONTINUED | OUTPATIENT
Start: 2018-09-16 | End: 2018-09-17 | Stop reason: HOSPADM

## 2018-09-16 RX ORDER — IBUPROFEN 800 MG/1
800 TABLET, FILM COATED ORAL EVERY 6 HOURS PRN
Status: DISCONTINUED | OUTPATIENT
Start: 2018-09-16 | End: 2018-09-17 | Stop reason: HOSPADM

## 2018-09-16 RX ORDER — OXYTOCIN 10 [USP'U]/ML
10 INJECTION, SOLUTION INTRAMUSCULAR; INTRAVENOUS
Status: DISCONTINUED | OUTPATIENT
Start: 2018-09-16 | End: 2018-09-17 | Stop reason: HOSPADM

## 2018-09-16 RX ORDER — OXYTOCIN/0.9 % SODIUM CHLORIDE 30/500 ML
340 PLASTIC BAG, INJECTION (ML) INTRAVENOUS CONTINUOUS PRN
Status: DISCONTINUED | OUTPATIENT
Start: 2018-09-16 | End: 2018-09-17 | Stop reason: HOSPADM

## 2018-09-16 RX ORDER — ACETAMINOPHEN 325 MG/1
650 TABLET ORAL EVERY 4 HOURS PRN
Status: DISCONTINUED | OUTPATIENT
Start: 2018-09-16 | End: 2018-09-17 | Stop reason: HOSPADM

## 2018-09-16 RX ORDER — BISACODYL 10 MG
10 SUPPOSITORY, RECTAL RECTAL DAILY PRN
Status: DISCONTINUED | OUTPATIENT
Start: 2018-09-18 | End: 2018-09-17 | Stop reason: HOSPADM

## 2018-09-16 RX ORDER — LANOLIN 100 %
OINTMENT (GRAM) TOPICAL
Status: DISCONTINUED | OUTPATIENT
Start: 2018-09-16 | End: 2018-09-17 | Stop reason: HOSPADM

## 2018-09-16 RX ORDER — OXYTOCIN/0.9 % SODIUM CHLORIDE 30/500 ML
100 PLASTIC BAG, INJECTION (ML) INTRAVENOUS CONTINUOUS
Status: DISCONTINUED | OUTPATIENT
Start: 2018-09-16 | End: 2018-09-17 | Stop reason: HOSPADM

## 2018-09-16 RX ORDER — MISOPROSTOL 200 UG/1
400 TABLET ORAL
Status: DISCONTINUED | OUTPATIENT
Start: 2018-09-16 | End: 2018-09-17 | Stop reason: HOSPADM

## 2018-09-16 RX ORDER — LIDOCAINE HYDROCHLORIDE 10 MG/ML
INJECTION, SOLUTION INFILTRATION; PERINEURAL PRN
Status: DISCONTINUED | OUTPATIENT
Start: 2018-09-15 | End: 2018-09-20

## 2018-09-16 RX ORDER — LIDOCAINE HYDROCHLORIDE AND EPINEPHRINE 15; 5 MG/ML; UG/ML
INJECTION, SOLUTION EPIDURAL PRN
Status: DISCONTINUED | OUTPATIENT
Start: 2018-09-16 | End: 2018-09-20

## 2018-09-16 RX ORDER — AMOXICILLIN 250 MG
1 CAPSULE ORAL 2 TIMES DAILY
Status: DISCONTINUED | OUTPATIENT
Start: 2018-09-16 | End: 2018-09-17 | Stop reason: HOSPADM

## 2018-09-16 RX ORDER — HYDROCORTISONE 2.5 %
CREAM (GRAM) TOPICAL 3 TIMES DAILY PRN
Status: DISCONTINUED | OUTPATIENT
Start: 2018-09-16 | End: 2018-09-17 | Stop reason: HOSPADM

## 2018-09-16 RX ADMIN — LIDOCAINE HYDROCHLORIDE AND EPINEPHRINE 75 MG: 15; 5 INJECTION, SOLUTION EPIDURAL at 00:00

## 2018-09-16 RX ADMIN — SODIUM CHLORIDE, POTASSIUM CHLORIDE, SODIUM LACTATE AND CALCIUM CHLORIDE: 600; 310; 30; 20 INJECTION, SOLUTION INTRAVENOUS at 08:58

## 2018-09-16 RX ADMIN — IBUPROFEN 800 MG: 800 TABLET ORAL at 17:10

## 2018-09-16 RX ADMIN — Medication 8 ML: at 00:08

## 2018-09-16 RX ADMIN — Medication 5 MG: at 00:23

## 2018-09-16 RX ADMIN — Medication 5 MG: at 00:16

## 2018-09-16 RX ADMIN — OXYTOCIN-SODIUM CHLORIDE 0.9% IV SOLN 30 UNIT/500ML 100 ML/HR: 30-0.9/5 SOLUTION at 11:00

## 2018-09-16 RX ADMIN — SENNOSIDES AND DOCUSATE SODIUM 1 TABLET: 8.6; 5 TABLET ORAL at 21:08

## 2018-09-16 RX ADMIN — FENTANYL CITRATE 10 ML/HR: 50 INJECTION, SOLUTION INTRAMUSCULAR; INTRAVENOUS at 00:12

## 2018-09-16 RX ADMIN — IBUPROFEN 800 MG: 800 TABLET ORAL at 23:56

## 2018-09-16 NOTE — PLAN OF CARE
Problem: Postpartum (Vaginal Delivery) (Adult,Obstetrics,Pediatric)  Goal: Signs and Symptoms of Listed Potential Problems Will be Absent, Minimized or Managed (Postpartum)  Signs and symptoms of listed potential problems will be absent, minimized or managed by discharge/transition of care (reference Postpartum (Vaginal Delivery) (Adult,Obstetrics,Pediatric) CPG).    Patient now plans to feed her baby formula. Given information sheet on formula preparation and stressed importance of good handwashing. Encouraged cue based feeding and continued skin to skin contact.

## 2018-09-16 NOTE — H&P
[facility] Labor and Delivery History and Physical    Yvette Weiss MRN# [id number]   Age: 33 year old YOB: 1985     Date of Admission:  9/15/2018    Primary care provider: No Ref-Primary, Physician           Chief Complaint:   Yvette Weiss is a 33 year old female who is 40w5d pregnant and being admitted for SROM 9/15/18 at 0700; she is GBS negative and currently on Pitocin for labor augmentation.          Pregnancy history:     OBSTETRIC HISTORY:    Obstetric History       T2      L2     SAB0   TAB0   Ectopic0   Multiple0   Live Births2       # Outcome Date GA Lbr Yaron/2nd Weight Sex Delivery Anes PTL Lv   3 Current            2 Term 16 41w0d 03:40 / 00:04 4.423 kg (9 lb 12 oz) F Vag-Spont IV REGIONAL N TRUDY      Apgar1:  7                Apgar5: 9   1 Term 14 40w6d 17:50 / 01:06 3.742 kg (8 lb 4 oz) M Vag-Spont EPI N TRUDY      Name: Suresh      Apgar1:  8                Apgar5: 9          EDC: Estimated Date of Delivery: 18    Prenatal Labs:   Lab Results   Component Value Date    ABO B 09/15/2018    RH Pos 09/15/2018    AS Neg 2018    HEPBANG Nonreactive 2018    CHPCRT  2015     Negative   Negative for C. trachomatis rRNA by transcription mediated amplification.   A negative result by transcription mediated amplification does not preclude the   presence of C. trachomatis infection because results are dependent on proper   and adequate collection, absence of inhibitors, and sufficient rRNA to be   detected.      GCPCRT  2015     Negative   Negative for N. gonorrhoeae rRNA by transcription mediated amplification.   A negative result by transcription mediated amplification does not preclude the   presence of N. gonorrhoeae infection because results are dependent on proper   and adequate collection, absence of inhibitors, and sufficient rRNA to be   detected.      TREPAB Negative 2018    RUBELLAABIGG 47 2013    HGB 11.8  05/31/2018    HIV Negative 08/21/2013       GBS Status:   Lab Results   Component Value Date    GBS Negative 08/17/2018       Active Problem List  Patient Active Problem List   Diagnosis     CARDIOVASCULAR SCREENING; LDL GOAL LESS THAN 160     24 hour clinic contact list given     Encounter for supervision of other normal pregnancy     Encounter for supervision of normal first pregnancy in first trimester       Medication Prior to Admission  Prescriptions Prior to Admission   Medication Sig Dispense Refill Last Dose     Cholecalciferol (VITAMIN D PO) Take 1,000 Units by mouth daily    9/14/2018 at PM     cyanocobalamin (VITAMIN  B-12) 1000 MCG tablet Take by mouth daily   9/14/2018 at PM     FOLIC ACID PO Take 1 mg by mouth daily   9/14/2018 at PM     KRILL OIL PO Take 2 capsules by mouth daily    9/14/2018 at PM     OMEGA-3 FATTY ACIDS PO Take 1 capsule by mouth daily    9/15/2018 at PM     Prenatal Vit-Fe Fumarate-FA (PRENATAL MULTIVITAMIN  PLUS IRON) 27-0.8 MG TABS Take 1 tablet by mouth daily 100 tablet 0 9/14/2018 at PM   .        Maternal Past Medical History:     Past Medical History:   Diagnosis Date     Anxiety     as teenager     ASCUS on Pap smear 2011    neg for High Risk HPV     Chickenpox     x2     Depression     as teenager     Gestational diabetes 2016     Lateral epicondylitis 9/14/2004     Postpartum depression 4/2014    mild                       Family History:     Family History   Problem Relation Age of Onset     Adopted: Yes     Alcohol/Drug Mother      A&D     Alcohol/Drug Father      A&D     Parkinsonism Paternal Grandmother      Cerebrovascular Disease Maternal Grandmother      x3     Other - See Comments Sister      fetal alcohol syndrome     Cancer Maternal Grandfather      lung     Other Cancer Paternal Grandfather      Lung     Family history reviewed and updated in [facility]            Social History:     Social History   Substance Use Topics     Smoking status: Former Smoker     Quit  date: 2010     Smokeless tobacco: Never Used     Alcohol use Yes      Comment: occas- quit with pregnancy            Review of Systems:   The Review of Systems is negative other than noted in the HPI          Physical Exam:   Vitals were reviewed  Temp: 98.1  F (36.7  C) Temp src: Oral BP: 96/52 Pulse: 67   Resp: 16 SpO2: 97 %      Constitutional:   awake, alert, cooperative, no apparent distress, and appears stated age     Lungs:   No increased work of breathing, good air exchange, clear to auscultation bilaterally, no crackles or wheezing     Cardiovascular:   Normal apical impulse, regular rate and rhythm, normal S1 and S2, no S3 or S4, and no murmur noted      Cervix:   Membranes: SROM  clear amniotic fluid   Dilation: 8   Effacement: 100%   Station:-1   Consistency: soft   Position: Anterior  Presentation:Cephalic  Fetal Heart Rate Tracing: Tier 1 (normal)  Tocometer: external monitor                       Assessment:   Yvette Weiss is a 40w5d pregnant female admitted with active labor management and SROM.          Plan:   Admit - see IP orders  Pain medication epidural  Anticipate   She has h/o large babies so team alerted     Ilsa Wilson MD

## 2018-09-16 NOTE — ANESTHESIA PROCEDURE NOTES
Peripheral nerve/Neuraxial procedure note : epidural catheter  Pre-Procedure  Performed by  EDMUND LOPEZ   Location: OB    Procedure Times:9/15/2018 11:45 PM and 9/16/2018 12:20 AM  Pre-Anesthestic Checklist: patient identified, IV checked, risks and benefits discussed, informed consent, monitors and equipment checked, pre-op evaluation and at physician/surgeon's request    Timeout  Correct Patient: Yes   Correct Procedure: Yes   Correct Site: Yes   Correct Laterality: N/A   Correct Position: Yes   Site Marked: N/A   .   Procedure Documentation    .    Procedure:    Epidural catheter.   (midline approach) Injection technique: LORT saline   Local skin infiltrated with 10 mL of 1% lidocaine.       Patient Prep;mask, sterile gloves, patient draped.  .  Needle: ToGenterprety needle Needle Gauge: 17.    Needle Length (Inches) 3.5  # of attempts: 1 and # of redirects:  .   Catheter: 19 G . .  Catheter threaded easily  4 cm epidural space.  9 cm at skin.   .    Assessment/Narrative  Paresthesias: No.  .  .  Aspiration negative for heme or CSF  . Test dose of 5 mL lidocaine 1.5% w/ 1:200,000 epinephrine at 00:05.  Test dose negative for signs of intravascular, subdural or intrathecal injection. Sensory Level Left: T8  Sensory Level Right: T8  Comments:  VAS pain score prior to epidural:7  VAS pain score after epidural:1    Pt. Tolerated well, FHR stable.

## 2018-09-16 NOTE — ANESTHESIA PREPROCEDURE EVALUATION
Anesthesia Evaluation       history and physical reviewed .      No history of anesthetic complications          ROS/MED HX    ENT/Pulmonary:  - neg pulmonary ROS     Neurologic: Comment: Depression anxiety      Cardiovascular:  - neg cardiovascular ROS       METS/Exercise Tolerance:     Hematologic:         Musculoskeletal:         GI/Hepatic:         Renal/Genitourinary:         Endo:         Psychiatric:         Infectious Disease:         Malignancy:         Other:                     Physical Exam  Normal systems: cardiovascular, pulmonary and dental    Airway   Mallampati: II  TM distance: > 3 FB  Neck ROM: full  Mouth opening: > 3 cm    Dental     Cardiovascular       Pulmonary           neg OB ROS                   Anesthesia Plan      History & Physical Review  History and physical reviewed and following examination; no interval change.    ASA Status:  2 .  OB Epidural Asa: 2       Plan for Epidural          Postoperative Care  Postoperative pain management:  IV analgesics and Oral pain medications.      Consents  Anesthetic plan, risks, benefits and alternatives discussed with:  Patient and Patient..                          .

## 2018-09-16 NOTE — PLAN OF CARE
Problem: Labor (Cervical Ripen, Induct, Augment) (Adult,Obstetrics,Pediatric)  Goal: Signs and Symptoms of Listed Potential Problems Will be Absent, Minimized or Managed (Labor)  Signs and symptoms of listed potential problems will be absent, minimized or managed by discharge/transition of care (reference Labor (Cervical Ripen, Induct, Augment) (Adult,Obstetrics,Pediatric) CPG).   Outcome: Improving  S:Delivery  B:Augmented  Labor,40w5d    Lab Results   Component Value Date    GBS Negative 2018    with antibiotic treatment not indicated 4 hours prior to delivery.  A: Patient delivered   lac 2nd degree at 1004 with Dr. CATARINA Wilson in attendance and baby placed on mother's abdomen for delayed cord clamping. Baby dried and stimulated. Baby placed skin to skin @ 1004. Apgars 8/9. Baby brought to warmer for tactile stimulation and measurements at 1010.  IV infusion of Oxytocin  infused. Placenta removal spontaneous. See Flowsheet for VS and PP checks. Labor care plan goals met, transition now to postpartum care.  R: Expect routine postpartum care. Anticipate first feeding within the hour or whenever infant displays feeding cues. Continue skin to skin. Prior discussion with mother indicates that feeding plan is Breast feeding . Educated mother on importance of exclusive breastfeeding, expected feeding readiness cues and encouraged her to observe for these cues while rooming in. Informed her that breastfeeding assistance would be provided.

## 2018-09-16 NOTE — L&D DELIVERY NOTE
Delivery Summary    Yvette Weiss MRN# [id number]   Age: 33 year old YOB: 1985     ASSESSMENT & PLAN: 34 y/o  presented at 40w 5d with SROM; GBS negative; she received pitocin augmentation and epidural for labor analgesia;she had normal stage 1 progress with  liveborn girl over 2nd degree perineal laceration;placenta delivered spontaneous and intact; lac repaired with 3-0 monocryl  EBL 200cc  7 lb 14 oz  Ilsa Wilson MD  [facility]          Labor Event Times    Labor onset date:  18 Onset time:   7:00 AM   Dilation complete date:  18 Complete time:   9:45 AM   Start pushing date/time:  2018 0945            Labor Length    1st Stage (hrs):  2 (min):  45   2nd Stage (hrs):  0 (min):  19   3rd Stage (hrs):  0 (min):  2      Labor Events     labor?:  No    steroids:  None   Labor Type:  Spontaneous, Augmentation   Predominate monitoring during 1st stage:  intermittent electronic fetal monitoring      Antibiotics received during labor?:  No      Rupture identifier:  Rupture 1   Rupture date/time: 9/15/18 0700   Rupture type:  Spontaneous rupture of membranes occuring during spontaneous labor or augmentation   Fluid color:  Clear      Augmentation:  Oxytocin   Indications for augmentation:  Prolonged ROM, Ineffective Contraction Pattern   1:1 continuous labor support provided by?:  RN Labor partogram used?:  no         Delivery/Placenta Date and Time    Delivery Date:  18 Delivery Time:  10:04 AM   Placenta Date/Time:  2018 10:06 AM   Oxytocin given at the time of delivery:  after delivery of baby      Vaginal Counts    Initial count performed by 2 team members:   Two Team Members   Yvonne STUART, RN          [place] [employer] [place] [employer]   Initial counts 2  5    Added to count  1     Final counts          Placed during labor Accounted for at the end of labor   No    No    No       Final count performed by 2 team  "members:   Two Team Members   Dr. Katie STUART, RN         Final count correct?:  Yes         Apgars    Living status:  Living    1 Minute 5 Minute 10 Minute 15 Minute 20 Minute   Skin color: 0  1       Heart rate: 2  2       Reflex irritability: 2  2       Muscle tone: 2  2       Respiratory effort: 2  2       Total: 8  9          Apgars assigned by:  MAZIN LOPEZ    Vessels:  3 Vessels Complications:  None   Cord Blood Disposition:  Lab Gases Sent?:  No          Resuscitation    Methods:  None         [place] Measurements    Weight:  7 lb 13.8 oz Length:  1' 9\"   Head circumference:  35.6 cm       Skin to Skin and Feeding Plan    Skin to skin initiation date/time: 18 1015   Skin to skin with:  Mother   Skin to skin end date/time:     Breastfeeding initiated date/time:  2018 1015   How do you plan to feed your baby:  Breastfeeding      Labor Events and Shoulder Dystocia    Fetal Tracing Prior to Delivery:  Category 1   Shoulder dystocia present?:  Neg            Delivery (Maternal) (Provider to Complete) (705892)    Episiotomy:  None   Perineal lacerations:  2nd Repaired?:  Yes   Vaginal laceration?:  No    Cervical laceration?:  No    Est. blood loss (mL):  200         Mother's Information  Mother: Yvette Weiss #[id number]    Start of Mother's Information     IO Blood Loss  18 0700 - 18 1124    Mom's I/O Activity            End of Mother's Information  Mother: Yvette Weiss #[id number]            Delivery - Provider to Complete (880577)    Delivering clinician:  PARDEEP ROACH   Attempted Delivery Types (Choose all that apply):  Spontaneous Vaginal Delivery   Delivery Type (Choose the 1 that will go to the Birth History):  Vaginal, Spontaneous Delivery                     Other personnel:   Provider Role   MAZIN LOPEZ CINDY             Placenta    Delayed Cord Clamping:  Done   Date/Time:  2018 10:06 AM   Removal:  Spontaneous "   Disposition:  Hospital disposal      Anesthesia    Method:  Epidural         Presentation and Position    Presentation:  Vertex   Position:  Right Occiput Anterior                    Ilsa Wilson MD

## 2018-09-16 NOTE — PROGRESS NOTES
Patient requesting epidural at 2330. Anesthesia notified. Procedure complete. Patient tolerated procedure well. Resting comfortably and pain free at this time.

## 2018-09-17 VITALS
DIASTOLIC BLOOD PRESSURE: 63 MMHG | RESPIRATION RATE: 16 BRPM | HEART RATE: 69 BPM | SYSTOLIC BLOOD PRESSURE: 109 MMHG | OXYGEN SATURATION: 98 % | TEMPERATURE: 97.9 F

## 2018-09-17 PROCEDURE — 25000132 ZZH RX MED GY IP 250 OP 250 PS 637: Performed by: OBSTETRICS & GYNECOLOGY

## 2018-09-17 RX ADMIN — SENNOSIDES AND DOCUSATE SODIUM 1 TABLET: 8.6; 5 TABLET ORAL at 07:57

## 2018-09-17 RX ADMIN — IBUPROFEN 800 MG: 800 TABLET ORAL at 06:40

## 2018-09-17 NOTE — PLAN OF CARE
Problem: Postpartum (Vaginal Delivery) (Adult,Obstetrics,Pediatric)  Goal: Signs and Symptoms of Listed Potential Problems Will be Absent, Minimized or Managed (Postpartum)  Signs and symptoms of listed potential problems will be absent, minimized or managed by discharge/transition of care (reference Postpartum (Vaginal Delivery) (Adult,Obstetrics,Pediatric) CPG).   Outcome: Improving  Hoping to go home today  independent

## 2018-09-17 NOTE — PROGRESS NOTES
PPD # 1    S: patient without complaints.    O: /64  Pulse 69  Temp 97.9  F (36.6  C) (Oral)  Resp 18  LMP 2017  SpO2 98%  Breastfeeding? Unknown   NAD  Abd: soft, nontender, fundus firm  Ext: calves nontender    A/P PPD # 1 s/p     Routine PP care.    Jolynn Cha M.D.

## 2018-09-17 NOTE — PLAN OF CARE
Problem: Postpartum (Vaginal Delivery) (Adult,Obstetrics,Pediatric)  Goal: Signs and Symptoms of Listed Potential Problems Will be Absent, Minimized or Managed (Postpartum)  Signs and symptoms of listed potential problems will be absent, minimized or managed by discharge/transition of care (reference Postpartum (Vaginal Delivery) (Adult,Obstetrics,Pediatric) CPG).   Data: Vital signs within normal limits. Postpartum checks within normal limits - see flow record. Patient eating and drinking normally. Patient able to empty bladder independently and is up ambulating. No apparent signs of infection. Perineum healing well. Patient performing self cares and is able to care for infant.  Action: Patient medicated during the shift for pain and cramping. See MAR. Patient reassessed within 1 hour after each medication and pain was improved - patient stated she was comfortable.  Response: Positive attachment behaviors observed with infant. Support persons 0 present.   Plan: Anticipate discharge on 9/18/18.

## 2018-09-17 NOTE — PLAN OF CARE
Problem: Postpartum (Vaginal Delivery) (Adult,Obstetrics,Pediatric)  Goal: Signs and Symptoms of Listed Potential Problems Will be Absent, Minimized or Managed (Postpartum)  Signs and symptoms of listed potential problems will be absent, minimized or managed by discharge/transition of care (reference Postpartum (Vaginal Delivery) (Adult,Obstetrics,Pediatric) CPG).   Outcome: Improving  Keeping pain under control with ibuprofen.  Encourage patient to call if needed tylenol in between Ibuprofen doses.  Patient agreed.

## 2018-09-17 NOTE — DISCHARGE SUMMARY
[facility] Discharge Summary    Yvette Weiss MRN# [id number]   Age: 33 year old YOB: 1985     Date of Admission:  9/15/2018  Date of Discharge::  9/17/2018  Admitting Physician:  Ilsa Wilson MD  Discharge Physician:  Jolynn Cha MD     Home clinic: [facility]          Admission Diagnoses:   Intrauterine pregnancy at 40w5d  weeks gestation          Discharge Diagnosis:   Normal spontaneous vaginal delivery  Intrauterine pregnancy at 40w5d  weeks gestation          Procedures:   Procedure(s): No additional procedures performed       No other procedures performed during this admission           Medications Prior to Admission:     Prescriptions Prior to Admission   Medication Sig Dispense Refill Last Dose     Cholecalciferol (VITAMIN D PO) Take 1,000 Units by mouth daily    9/14/2018 at PM     cyanocobalamin (VITAMIN  B-12) 1000 MCG tablet Take by mouth daily   9/14/2018 at PM     FOLIC ACID PO Take 1 mg by mouth daily   9/14/2018 at PM     KRILL OIL PO Take 2 capsules by mouth daily    9/14/2018 at PM     OMEGA-3 FATTY ACIDS PO Take 1 capsule by mouth daily    9/15/2018 at PM     Prenatal Vit-Fe Fumarate-FA (PRENATAL MULTIVITAMIN  PLUS IRON) 27-0.8 MG TABS Take 1 tablet by mouth daily 100 tablet 0 9/14/2018 at PM             Discharge Medications:     Current Discharge Medication List      CONTINUE these medications which have NOT CHANGED    Details   Cholecalciferol (VITAMIN D PO) Take 1,000 Units by mouth daily       cyanocobalamin (VITAMIN  B-12) 1000 MCG tablet Take by mouth daily      FOLIC ACID PO Take 1 mg by mouth daily      KRILL OIL PO Take 2 capsules by mouth daily       OMEGA-3 FATTY ACIDS PO Take 1 capsule by mouth daily       Prenatal Vit-Fe Fumarate-FA (PRENATAL MULTIVITAMIN  PLUS IRON) 27-0.8 MG TABS Take 1 tablet by mouth daily  Qty: 100 tablet, Refills: 0    Associated Diagnoses: Positive pregnancy test                   Consultations:   No  consultations were requested during this admission          Brief History of Labor:   32 y/o  presented at 40w 5d with SROM; GBS negative; she received pitocin augmentation and epidural for labor analgesia;she had normal stage 1 progress with  liveborn girl over 2nd degree perineal laceration;placenta delivered spontaneous and intact; lac repaired with 3-0 monocryl  EBL 200cc  7 lb 14 oz           Hospital Course:   The patient's hospital course was unremarkable.  On discharge, her pain was well controlled. Vaginal bleeding is similar to peak menstrual flow.  Voiding without difficulty.  Ambulating well and tolerating a normal diet.  No fever.  Breastfeeding well.  Infant is stable.  She was discharged on post-partum day #1.    Post-partum hemoglobin:   Hemoglobin   Date Value Ref Range Status   2018 11.8 11.7 - 15.7 g/dL Final             Discharge Instructions and Follow-Up:   Discharge diet: Regular   Discharge activity: Activity as tolerated   Discharge follow-up: Follow up with OB clinic in 6 weeks   Wound care: Drink plenty of fluids  Ice to area for comfort           Discharge Disposition:   Discharged to home      Attestation:  I have reviewed today's vital signs, notes, medications, labs and imaging.    Jolynn Cha MD

## 2018-09-17 NOTE — DISCHARGE INSTRUCTIONS
Postpartum Vaginal Delivery Instructions        Discharge Instructions and Follow-Up:   Discharge diet: Regular   Discharge activity: Activity as tolerated   Discharge follow-up: Follow up with OB clinic in 6 weeks   Wound care: Drink plenty of fluids  Ice to area for comfort         -    Activity       Ask family and friends for help when you need it.    Do not place anything in your vagina for 6 weeks.    You are not restricted on other activities, but take it easy for a few weeks to allow your body to recover from delivery.  You are able to do any activities you feel up to that point.    No driving until you have stopped taking your pain medications (usually two weeks after delivery).     Call your health care provider if you have any of these symptoms:       Increased pain, swelling, redness, or fluid around your stiches from an episiotomy or perineal tear.    A fever above 100.4 F (38 C) with or without chills when placing a thermometer under your tongue.    You soak a sanitary pad with blood within 1 hour, or you see blood clots larger than a golf ball.    Bleeding that lasts more than 6 weeks.    Vaginal discharge that smells bad.    Severe pain, cramping or tenderness in your lower belly area.    A need to urinate more frequently (use the toilet more often), more urgently (use the toilet very quickly), or it burns when you urinate.    Nausea and vomiting.    Redness, swelling or pain around a vein in your leg.    Problems breastfeeding or a red or painful area on your breast.    Chest pain and cough or are gasping for air.    Problems coping with sadness, anxiety, or depression.  If you have any concerns about hurting yourself or the baby, call your provider immediately.     You have questions or concerns after you return home.     Keep your hands clean:  Always wash your hands before touching your perineal area and stitches.  This helps reduce your risk of infection.  If your hands aren't dirty, you may use  an alcohol hand-rub to clean your hands. Keep your nails clean and short.

## 2018-09-17 NOTE — PROGRESS NOTES
Patient discharged today at 1330, per ambulatory with infant in car seat. Mother verified that her band matches her infant's band by comparing the infant's  MR#.  Discharge instructions given. Encouraged to call for any problems, questions or concerns. RXs 0.

## 2018-09-17 NOTE — PLAN OF CARE
Problem: Patient Care Overview  Goal: Plan of Care/Patient Progress Review  Outcome: Improving  Patient progressing well.  Ambulating, Eating and voiding well. Independent with mother/baby cares. Bonding well with infant.  Breast feeding is going well, baby is latching well and feeding for good lengths of time. Patient rates cramping Pain comfortably manageable.  Taking Ibuprofen when due with good relief.  Made plan with patient to bring pain medication when due. Patient encouraged to report increased bleeding or clots.

## 2018-09-19 ENCOUNTER — OFFICE VISIT (OUTPATIENT)
Dept: FAMILY MEDICINE | Facility: CLINIC | Age: 33
End: 2018-09-19
Payer: COMMERCIAL

## 2018-09-19 VITALS
SYSTOLIC BLOOD PRESSURE: 118 MMHG | HEIGHT: 62 IN | DIASTOLIC BLOOD PRESSURE: 72 MMHG | OXYGEN SATURATION: 98 % | WEIGHT: 162 LBS | TEMPERATURE: 99 F | BODY MASS INDEX: 29.81 KG/M2 | HEART RATE: 85 BPM

## 2018-09-19 DIAGNOSIS — K64.5 THROMBOSED EXTERNAL HEMORRHOIDS: Primary | ICD-10-CM

## 2018-09-19 PROCEDURE — 46083 INC THROMBOSED HROID XTRNL: CPT | Performed by: FAMILY MEDICINE

## 2018-09-19 NOTE — PROGRESS NOTES
SUBJECTIVE:   Yvette Weiss is a 33 year old female who presents to clinic today for the following health issues:      Hemorrhoids       Duration: Started yesterday.  Had her baby on Sunday.    Description:   Pain: YES- Mild-moderate and severe.  Itching: no     Accompanying signs and symptoms:   Blood in stool: no, has not had a bowel movement yet today.  Did not see any blood yesterday.   Changes in stool pattern: not that she has noticed.  She did just deliver a baby on Sunday, so it is hard to tell.    History (similar episodes/previous evaluation): Has had hemorrhoids in the past after delivery.  Not like this though.    Precipitating or alleviating factors: Walking and sitting can be more painful.    Therapies tried and outcome: Epsom bath today, tucks and spray.  Pain is still present.         Current Outpatient Prescriptions:      Cholecalciferol (VITAMIN D PO), Take 1,000 Units by mouth daily , Disp: , Rfl:      cyanocobalamin (VITAMIN  B-12) 1000 MCG tablet, Take by mouth daily, Disp: , Rfl:      FOLIC ACID PO, Take 1 mg by mouth daily, Disp: , Rfl:      KRILL OIL PO, Take 2 capsules by mouth daily , Disp: , Rfl:      OMEGA-3 FATTY ACIDS PO, Take 1 capsule by mouth daily , Disp: , Rfl:      Prenatal Vit-Fe Fumarate-FA (PRENATAL MULTIVITAMIN  PLUS IRON) 27-0.8 MG TABS, Take 1 tablet by mouth daily, Disp: 100 tablet, Rfl: 0  No current facility-administered medications for this visit.     Facility-Administered Medications Ordered in Other Visits:      bupivacaine (MARCAINE) 0.125 % injection (diluted from stock concentration by MD or CRNA), , EPIDURAL, PRN, Jasmyn Arriaga APRN CRNA, 8 mL at 09/16/18 0008     lidocaine 1 % injection, , Intradermal, PRN, Jasmyn Arriaga APRN CRNA, 10 mg at 09/15/18 2351     lidocaine-EPINEPHrine 1.5 %-[id number] injection, , EPIDURAL, PRN, Jasmyn Arriaga APRN CRNA, 75 mg at 09/16/18 0000    Patient Active Problem List   Diagnosis      "CARDIOVASCULAR SCREENING; LDL GOAL LESS THAN 160     24 hour clinic contact list given     Encounter for supervision of other normal pregnancy     Encounter for supervision of normal first pregnancy in first trimester      (normal spontaneous vaginal delivery)       Blood pressure 118/72, pulse 85, temperature 99  F (37.2  C), temperature source Tympanic, height 5' 2.25\" (1.581 m), weight 162 lb (73.5 kg), last menstrual period 2017, SpO2 98 %, unknown if currently breastfeeding.  Exam:  GENERAL APPEARANCE: healthy, alert and no distress  Rectal exam: she has an external thrombosed hemorrhoid 3 cm in diameter. It is tender and not bleeding.   PSYCH: mentation appears normal and affect normal/bright        (K64.5) Thrombosed external hemorrhoids  (primary encounter diagnosis)  Comment:   Plan: we discussed the hemorrhoid and the options for therapy. Betadine was used for prep after she was on the surgical table.   1% Lidocaine was used for local anesthesia on the hemorrhoid and a cruciate incision was made. The thrombosis was removed and the edges of the incision corners were trimmed.   A dressing was applied with gauze and instructions given for care. Sitz baths may be used for this. Use Tylenol and avoid advil.       Larry Aguilar    "

## 2018-09-19 NOTE — MR AVS SNAPSHOT
After Visit Summary   9/19/2018    Yvette Weiss    MRN: [id number]           Patient Information     Date Of Birth          1985        Visit Information        Provider Department      9/19/2018 2:20 PM Larry Aguilar MD [facility]        Today's Diagnoses     Thrombosed external hemorrhoids    -  1      Care Instructions          Thank you for choosing [facility].  You may be receiving a survey in the mail from [facility] regarding your visit today.  Please take a few minutes to complete and return the survey to let us know how we are doing.      If you have questions or concerns, please contact us via Envivio or you can contact your care team at [phone].    Our Clinic hours are:  Monday 6:40 am  to 7:00 pm  Tuesday -Friday 6:40 am to 5:00 pm    The [place] outpatient lab hours are:  Monday - Friday 6:10 am to 4:45 pm  Saturdays 7:00 am to 11:00 am  Appointments are required, call [phone]    If you have clinical questions after hours or would like to schedule an appointment,  call the clinic at [phone].            Follow-ups after your visit        Who to contact     If you have questions or need follow up information about today's clinic visit or your schedule please contact [facility] directly at [phone].  Normal or non-critical lab and imaging results will be communicated to you by CommercialTribehart, letter or phone within 4 business days after the clinic has received the results. If you do not hear from us within 7 days, please contact the clinic through Nfosharet or phone. If you have a critical or abnormal lab result, we will notify you by phone as soon as possible.  Submit refill requests through Envivio or call your pharmacy and they will forward the refill request to us. Please allow 3 business days for your refill to be completed.          Additional Information About Your Visit        CommercialTribehart Information     Envivio gives you secure  "access to your electronic health record. If you see a primary care provider, you can also send messages to your care team and make appointments. If you have questions, please call your primary care clinic.  If you do not have a primary care provider, please call [phone] and they will assist you.        Care EveryWhere ID     This is your Care EveryWhere ID. This could be used by other organizations to access your [facility] medical records  [id number]        Your Vitals Were     Pulse Temperature Height Last Period Pulse Oximetry BMI (Body Mass Index)    85 99  F (37.2  C) (Tympanic) 5' 2.25\" (1.581 m) 12/05/2017 98% 29.39 kg/m2       Blood Pressure from Last 3 Encounters:   09/19/18 118/72   09/17/18 109/63   09/13/18 127/64    Weight from Last 3 Encounters:   09/19/18 162 lb (73.5 kg)   09/13/18 176 lb (79.8 kg)   09/06/18 176 lb 6.4 oz (80 kg)              We Performed the Following     INCISION THROMBOSED HEMORRHOID, EXTERNAL        Primary Care Provider Fax #    Physician No Ref-Primary [phone]       No address on file        Equal Access to Services     BLANCA HERNANDEZ : Hadii ruba Desouza, waaxda luqadaha, qaybta kaalmada adecorinneda, juan jose camejo . [facility] [phone].    ATENCIÓN: Si habla español, tiene a nick disposición servicios gratuitos de asistencia lingüística. Llame al [phone].    We comply with applicable federal civil rights laws and [place] laws. We do not discriminate on the basis of race, color, national origin, age, disability, sex, sexual orientation, or gender identity.            Thank you!     Thank you for choosing [facility]  for your care. Our goal is always to provide you with excellent care. Hearing back from our patients is one way we can continue to improve our services. Please take a few minutes to complete the written survey that you may receive in the mail after your visit with us. Thank you!             Your " Updated Medication List - Protect others around you: Learn how to safely use, store and throw away your medicines at www.disposemymeds.org.          This list is accurate as of 9/19/18 11:59 PM.  Always use your most recent med list.                   Brand Name Dispense Instructions for use Diagnosis    cyanocobalamin 1000 MCG tablet    vitamin  B-12     Take by mouth daily        FOLIC ACID PO      Take 1 mg by mouth daily        KRILL OIL PO      Take 2 capsules by mouth daily        OMEGA-3 FATTY ACIDS PO      Take 1 capsule by mouth daily        prenatal multivitamin plus iron 27-0.8 MG Tabs per tablet     100 tablet    Take 1 tablet by mouth daily    Positive pregnancy test       VITAMIN D PO      Take 1,000 Units by mouth daily

## 2018-09-19 NOTE — PATIENT INSTRUCTIONS
Thank you for choosing [facility].  You may be receiving a survey in the mail from [facility] regarding your visit today.  Please take a few minutes to complete and return the survey to let us know how we are doing.      If you have questions or concerns, please contact us via nfon or you can contact your care team at [phone].    Our Clinic hours are:  Monday 6:40 am  to 7:00 pm  Tuesday -Friday 6:40 am to 5:00 pm    The [place] outpatient lab hours are:  Monday - Friday 6:10 am to 4:45 pm  Saturdays 7:00 am to 11:00 am  Appointments are required, call [phone]    If you have clinical questions after hours or would like to schedule an appointment,  call the clinic at [phone].

## 2018-09-20 NOTE — ANESTHESIA POSTPROCEDURE EVALUATION
Patient: Yvette Weiss    * No procedures listed *    Diagnosis:* No pre-op diagnosis entered *  Diagnosis Additional Information: No value filed.    Anesthesia Type:  Epidural    Note:  Anesthesia Post Evaluation    Patient location: pt D/C home.  Patient participation: Able to fully participate in evaluation  Level of consciousness: awake and alert  Pain management: adequate  Airway patency: patent  Cardiovascular status: acceptable and hemodynamically stable  Respiratory status: acceptable and room air  Hydration status: acceptable  PONV: none     Anesthetic complications: None          Last vitals:  Vitals:    09/16/18 1931 09/16/18 2345 09/17/18 0815   BP: 107/58 106/64 109/63   Pulse: 69     Resp: 18 18 16   Temp: 37  C (98.6  F) 36.6  C (97.9  F) 36.6  C (97.9  F)   SpO2: 99% 98%          Electronically Signed By: JUNE Rincon CRNA  September 20, 2018  7:56 AM

## 2018-11-02 ENCOUNTER — PRENATAL OFFICE VISIT (OUTPATIENT)
Dept: OBGYN | Facility: CLINIC | Age: 33
End: 2018-11-02
Payer: COMMERCIAL

## 2018-11-02 VITALS
HEIGHT: 62 IN | TEMPERATURE: 97.6 F | WEIGHT: 151 LBS | DIASTOLIC BLOOD PRESSURE: 79 MMHG | BODY MASS INDEX: 27.79 KG/M2 | SYSTOLIC BLOOD PRESSURE: 133 MMHG | RESPIRATION RATE: 18 BRPM | HEART RATE: 75 BPM

## 2018-11-02 PROBLEM — Z34.01 ENCOUNTER FOR SUPERVISION OF NORMAL FIRST PREGNANCY IN FIRST TRIMESTER: Status: RESOLVED | Noted: 2018-09-15 | Resolved: 2018-11-02

## 2018-11-02 PROCEDURE — 99207 ZZC POST PARTUM EXAM: CPT | Performed by: OBSTETRICS & GYNECOLOGY

## 2018-11-02 ASSESSMENT — ANXIETY QUESTIONNAIRES
IF YOU CHECKED OFF ANY PROBLEMS ON THIS QUESTIONNAIRE, HOW DIFFICULT HAVE THESE PROBLEMS MADE IT FOR YOU TO DO YOUR WORK, TAKE CARE OF THINGS AT HOME, OR GET ALONG WITH OTHER PEOPLE: NOT DIFFICULT AT ALL
2. NOT BEING ABLE TO STOP OR CONTROL WORRYING: NOT AT ALL
3. WORRYING TOO MUCH ABOUT DIFFERENT THINGS: NOT AT ALL
7. FEELING AFRAID AS IF SOMETHING AWFUL MIGHT HAPPEN: NOT AT ALL
5. BEING SO RESTLESS THAT IT IS HARD TO SIT STILL: NOT AT ALL
GAD7 TOTAL SCORE: 1
6. BECOMING EASILY ANNOYED OR IRRITABLE: SEVERAL DAYS
1. FEELING NERVOUS, ANXIOUS, OR ON EDGE: NOT AT ALL

## 2018-11-02 ASSESSMENT — PATIENT HEALTH QUESTIONNAIRE - PHQ9
5. POOR APPETITE OR OVEREATING: NOT AT ALL
SUM OF ALL RESPONSES TO PHQ QUESTIONS 1-9: 9

## 2018-11-02 NOTE — PROGRESS NOTES
"Yvette is a 33 year old female 6 weeks S/P  of a liveborn baby girl.  The delivery was uncomplicated.  She is not still bleeding.  She is breastfeeding, and has selected nothing for birth control.  Her last PAP smear was , and was Normal; her  PHQ-9 inventory score is: 9    Exam: /79 (BP Location: Right arm, Patient Position: Chair, Cuff Size: Adult Small)  Pulse 75  Temp 97.6  F (36.4  C) (Tympanic)  Resp 18  Ht 5' 2.25\" (1.581 m)  Wt 151 lb (68.5 kg)  LMP 2017  Breastfeeding? Yes  BMI 27.4 kg/m2  perineal laceration healed, cervix parous and uterus small, non-tender, no adnexal masses    Assessment:  Postpartum    Plan:  RTC for annual exams and PRN  Kegel exercises reviewed  Ilsa Wilson MD  Formerly named [facility]      "

## 2018-11-03 ASSESSMENT — ANXIETY QUESTIONNAIRES: GAD7 TOTAL SCORE: 1

## 2019-11-03 ENCOUNTER — HEALTH MAINTENANCE LETTER (OUTPATIENT)
Age: 34
End: 2019-11-03

## 2020-02-10 ENCOUNTER — HEALTH MAINTENANCE LETTER (OUTPATIENT)
Age: 35
End: 2020-02-10

## 2020-04-02 ENCOUNTER — OFFICE VISIT (OUTPATIENT)
Dept: FAMILY MEDICINE | Facility: CLINIC | Age: 35
End: 2020-04-02
Payer: COMMERCIAL

## 2020-04-02 ENCOUNTER — OFFICE VISIT (OUTPATIENT)
Dept: OBGYN | Facility: CLINIC | Age: 35
End: 2020-04-02
Payer: COMMERCIAL

## 2020-04-02 VITALS
SYSTOLIC BLOOD PRESSURE: 102 MMHG | OXYGEN SATURATION: 100 % | HEIGHT: 62 IN | HEART RATE: 83 BPM | DIASTOLIC BLOOD PRESSURE: 62 MMHG | TEMPERATURE: 98.8 F | WEIGHT: 149.4 LBS | BODY MASS INDEX: 27.49 KG/M2

## 2020-04-02 DIAGNOSIS — N90.7 VULVAR CYST: Primary | ICD-10-CM

## 2020-04-02 PROCEDURE — 88305 TISSUE EXAM BY PATHOLOGIST: CPT | Performed by: OBSTETRICS & GYNECOLOGY

## 2020-04-02 PROCEDURE — 88112 CYTOPATH CELL ENHANCE TECH: CPT | Performed by: OBSTETRICS & GYNECOLOGY

## 2020-04-02 PROCEDURE — 00000155 ZZHCL STATISTIC H-CELL BLOCK W/STAIN: Performed by: OBSTETRICS & GYNECOLOGY

## 2020-04-02 PROCEDURE — 99207 ZZC NO BILLABLE SERVICE THIS VISIT: CPT | Performed by: INTERNAL MEDICINE

## 2020-04-02 PROCEDURE — 56405 I&D VULVA/PERINEAL ABSCESS: CPT | Performed by: OBSTETRICS & GYNECOLOGY

## 2020-04-02 PROCEDURE — 99213 OFFICE O/P EST LOW 20 MIN: CPT | Mod: 25 | Performed by: OBSTETRICS & GYNECOLOGY

## 2020-04-02 ASSESSMENT — ANXIETY QUESTIONNAIRES
7. FEELING AFRAID AS IF SOMETHING AWFUL MIGHT HAPPEN: NOT AT ALL
1. FEELING NERVOUS, ANXIOUS, OR ON EDGE: MORE THAN HALF THE DAYS
GAD7 TOTAL SCORE: 7
IF YOU CHECKED OFF ANY PROBLEMS ON THIS QUESTIONNAIRE, HOW DIFFICULT HAVE THESE PROBLEMS MADE IT FOR YOU TO DO YOUR WORK, TAKE CARE OF THINGS AT HOME, OR GET ALONG WITH OTHER PEOPLE: SOMEWHAT DIFFICULT
5. BEING SO RESTLESS THAT IT IS HARD TO SIT STILL: NOT AT ALL
2. NOT BEING ABLE TO STOP OR CONTROL WORRYING: SEVERAL DAYS
6. BECOMING EASILY ANNOYED OR IRRITABLE: MORE THAN HALF THE DAYS
3. WORRYING TOO MUCH ABOUT DIFFERENT THINGS: SEVERAL DAYS

## 2020-04-02 ASSESSMENT — MIFFLIN-ST. JEOR: SCORE: 1320.92

## 2020-04-02 ASSESSMENT — PATIENT HEALTH QUESTIONNAIRE - PHQ9
SUM OF ALL RESPONSES TO PHQ QUESTIONS 1-9: 11
5. POOR APPETITE OR OVEREATING: SEVERAL DAYS

## 2020-04-02 NOTE — PATIENT INSTRUCTIONS
Thank you for choosing [facility].  You may be receiving an email and/or telephone survey request from [facility] [employer] regarding your visit today.  Please take a few minutes to respond to the survey to let us know how we are doing.      If you have questions or concerns, please contact us via Walldress or you can contact your care team at [phone].    Our Clinic hours are:  Monday 6:40 am  to 7:00 pm  Tuesday -Friday 6:40 am to 5:00 pm    The [place] outpatient lab hours are:  Monday - Friday 6:10 am to 4:45 pm  Saturdays 7:00 am to 11:00 am  Appointments are required, call [phone]    If you have clinical questions after hours or would like to schedule an appointment,  call the clinic at [phone].

## 2020-04-02 NOTE — PROGRESS NOTES
"Subjective     Yvette Weiss is a 35 year old female who presents to clinic today for the following health issues:     Chief Complaint   Patient presents with     Vaginal Problem     lump in vaginal area      Health Maintenance     reminded due for pappe/pe, TD - will do Both at another time         Concern - Lump In Vaginal Area   Onset: 2 weeks ago     Description:   Lump - size is described by patient as bigger than a Bean, smaller than a Grape. Has gotten bigger     Intensity: mild    Progression of Symptoms:  worsening    Accompanying Signs & Symptoms:  Lump does itch     Previous history of similar problem:   Has had a vaginal lump in this same area in the past. Gynecologist told her last lump was a cyst .     Precipitating factors:   Worsened by: none     Alleviating factors:Improved by: Ice - Helps     Therapies Tried and outcome: Ice - Helps, Tylenol Helps Pain       Current Outpatient Medications   Medication Sig Dispense Refill     Cholecalciferol (VITAMIN D PO) Take 1,000 Units by mouth daily            Reviewed and updated as needed this visit by Provider         Review of Systems   ROS COMP: Constitutional, HEENT, cardiovascular, pulmonary, gi and gu systems are negative, except as otherwise noted.      Objective    /62 (BP Location: Right arm, Patient Position: Chair, Cuff Size: Adult Regular)   Pulse 83   Temp 98.8  F (37.1  C) (Tympanic)   Ht 1.575 m (5' 2\")   Wt 67.8 kg (149 lb 6.4 oz)   LMP 03/30/2020   SpO2 100%   Breastfeeding No   BMI 27.33 kg/m    Body mass index is 27.33 kg/m .  Physical Exam   GENERAL APPEARANCE: healthy, alert and no distress   (female): periclitoral cyst that is tender, no drainage and very minimal erythema over the swelling.            Assessment & Plan       ICD-10-CM    1. Vulvar cyst  N90.7         Was able to have OB/GYN see patient to perform I&D of the cyst. Please see GYN note for A/P.  No charge for todays visit      Reyna Oden, " DO  [facility]

## 2020-04-03 ASSESSMENT — ANXIETY QUESTIONNAIRES: GAD7 TOTAL SCORE: 7

## 2020-04-04 NOTE — PROGRESS NOTES
I was contacted by Dr. Oden to see Yvette, a 35-year-old para 3-0-0-3 with a recurrence of a vulvar cyst in the area of the clitoris on her right side.  She has a history of similar cyst, much smaller in the remote past which resolved on its own.  The cyst is getting larger and more uncomfortable.  She denies any new sexual partners.  She denies any trauma.Patient's last menstrual period was 03/30/2020.  She is not currently sexually active.  She denies fever chills or sweats.  She has had no vaginal discharge.  No voiding dysfunction.    Past Medical History:   Diagnosis Date     Anxiety     as teenager     ASCUS on Pap smear 2011    neg for High Risk HPV     Chickenpox     x2     Depression     as teenager     Gestational diabetes 2016     Lateral epicondylitis 9/14/2004     Postpartum depression 4/2014    mild     Past Surgical History:   Procedure Laterality Date     ORTHOPEDIC SURGERY  1997    ORIF left wrist        ROS: 10 point ROS neg other than the symptoms noted above in the HPI.      Temperature 98.8 weight 149.4 pounds pulse of 83 blood pressure 102/62.  General she is a adult white female in no acute distress.  In the dorsolithotomy position she points to the right side of her clitoris as the area of swelling.  Vulva is negative for erythema, lesions, ulcerations.  Hair pattern is normal.  She has a slight enlargement of the right side of the clitoris which is mildly tender to palpation.    She has no other lesions.  No vaginal discharge appreciated.    We discussed I&D using a needle.  Risks and benefits were discussed questions were answered and consent was signed.  L ET was applied to the lesion and allowed to set.  The area was wiped with Betadine solution.  A 21-gauge needle was then used approaching from superior, this was placed into the cyst and a small amount of serous fluid removed and sent for cytology.    She did tolerate the procedure well.    Assessment:  Right periclitoral cyst  Plan:  aspiration and fluid sent for cytology  Symptomatic care.    Cheng Sims MD

## 2020-04-06 LAB — COPATH REPORT: NORMAL

## 2020-04-13 ENCOUNTER — TELEPHONE (OUTPATIENT)
Dept: OBGYN | Facility: CLINIC | Age: 35
End: 2020-04-13

## 2020-04-13 NOTE — TELEPHONE ENCOUNTER
"Return call to patient.  Spoke with patient on the phone.    Patient looking for an appointment.  Offered tomorrow.  Patient reports \" my day off is Thursday this week so that is when I can come.\"    Patient scheduled for follow up / reassessment on Thursday. Patient denies further questions or concerns.    Lou Finnegan   Ob/Gyn Clinic  RN        "

## 2020-04-13 NOTE — TELEPHONE ENCOUNTER
Reason for Call:  Other     Detailed comments: Pt was seen on 04/02 and had a vulvar cyst partially drained and tried to drain the rest at home. States it is swollen and painful again - Please advise     Pt would be available for an OV this Thur or Fri after 12:00    Phone Number Patient can be reached at: Home number on file [phone] (home)    Best Time: Any    Can we leave a detailed message on this number? YES    Call taken on 4/13/2020 at 7:43 AM by Denise Behrendt

## 2020-04-16 ENCOUNTER — OFFICE VISIT (OUTPATIENT)
Dept: OBGYN | Facility: CLINIC | Age: 35
End: 2020-04-16
Payer: COMMERCIAL

## 2020-04-16 VITALS
BODY MASS INDEX: 27.99 KG/M2 | DIASTOLIC BLOOD PRESSURE: 80 MMHG | WEIGHT: 152.1 LBS | HEART RATE: 84 BPM | SYSTOLIC BLOOD PRESSURE: 132 MMHG | HEIGHT: 62 IN | TEMPERATURE: 98.6 F | RESPIRATION RATE: 16 BRPM

## 2020-04-16 DIAGNOSIS — N76.4 VULVAR ABSCESS: Primary | ICD-10-CM

## 2020-04-16 DIAGNOSIS — Z01.419 ENCOUNTER FOR GYNECOLOGICAL EXAMINATION WITHOUT ABNORMAL FINDING: ICD-10-CM

## 2020-04-16 PROCEDURE — 99213 OFFICE O/P EST LOW 20 MIN: CPT | Performed by: OBSTETRICS & GYNECOLOGY

## 2020-04-16 PROCEDURE — 87624 HPV HI-RISK TYP POOLED RSLT: CPT | Performed by: OBSTETRICS & GYNECOLOGY

## 2020-04-16 PROCEDURE — G0145 SCR C/V CYTO,THINLAYER,RESCR: HCPCS | Performed by: OBSTETRICS & GYNECOLOGY

## 2020-04-16 RX ORDER — CEPHALEXIN 500 MG/1
500 CAPSULE ORAL 3 TIMES DAILY
Qty: 30 CAPSULE | Refills: 0 | Status: SHIPPED | OUTPATIENT
Start: 2020-04-16 | End: 2020-04-26

## 2020-04-16 ASSESSMENT — MIFFLIN-ST. JEOR: SCORE: 1333.17

## 2020-04-16 NOTE — PROGRESS NOTES
[facility]:  Follow up  from Dr Oden for vulvar cyst  HPI:  Yvette Weiss is a 35 year old female is a   .  Patient's last menstrual period was 03/30/2020.  Menses are regular q 28-30 days, lasting 5 days.  Is cuca using condoms for control.  Her  is supposed to get a  vasectomy at some point.  In consultation with Dr. Oden 2 weeks ago at which time the needle the right periclitoral cyst.  Will return with neutrophils.  Since that time she had drainage for 2 days.  The drainage is stopped.  She developed a pimple in the superior aspect of the cyst which popped and drained blood and cloudy fluid consistent with a abscess.  She has had no fever.  She has no other lesions.  She has no history of yeast infection.  Patients records are available and reviewed at today's visit.    Past GYN history:  No STD history       Last PAP smear:  Normal  Last TSH:   TSH   Date Value Ref Range Status   08/01/2011 1.44 mcU/mL     , normal?  Yes    Past Medical History:   Diagnosis Date     Anxiety     as teenager     ASCUS on Pap smear 2011    neg for High Risk HPV     Chickenpox     x2     Depression     as teenager     Gestational diabetes 2016     Lateral epicondylitis 9/14/2004     Postpartum depression 4/2014    mild       Past Surgical History:   Procedure Laterality Date     ORTHOPEDIC SURGERY  1997    ORIF left wrist       Family History   Adopted: Yes   Problem Relation Age of Onset     Alcohol/Drug Mother         A&D     Alcohol/Drug Father         A&D     Parkinsonism Paternal Grandmother      Cerebrovascular Disease Maternal Grandmother         x3     Other - See Comments Sister         fetal alcohol syndrome     Cancer Maternal Grandfather         lung     Other Cancer Paternal Grandfather         Lung       Allergies: Latex    Current Outpatient Medications   Medication Sig Dispense Refill     Cholecalciferol (VITAMIN D PO) Take 1,000 Units by mouth daily          ROS:  C: NEGATIVE for fever,  "chills, change in weight  I: NEGATIVE for worrisome rashes, moles or lesions  E: NEGATIVE for vision changes or irritation  E/M: NEGATIVE for ear, mouth and throat problems  R: NEGATIVE for significant cough or SOB  CV: NEGATIVE for chest pain, palpitations or peripheral edema  GI: NEGATIVE for nausea, abdominal pain, heartburn, or change in bowel habits  : NEGATIVE for frequency, dysuria, hematuria, vaginal discharge  M: NEGATIVE for significant arthralgias or myalgia  N: NEGATIVE for weakness, dizziness or paresthesias  E: NEGATIVE for temperature intolerance, skin/hair changes  P: NEGATIVE for changes in mood or affect    EXAM:  Blood pressure 132/80, pulse 84, temperature 98.6  F (37  C), resp. rate 16, height 1.575 m (5' 2\"), weight 69 kg (152 lb 1.6 oz), last menstrual period 03/30/2020, not currently breastfeeding.   BMI= Body mass index is 27.82 kg/m .  General - pleasant female in no acute distress.  Neck - supple without lymphadenopathy or thyromegaly.  Abdomen - soft, nontender, nondistended, no hepatosplenomegaly.  No scars noted.    Pelvic -  EG: normal adult female,.  The right periclitoral area is mildly ecchymotic and fluctuant.  There is no erythema.  I am unable to produce any fluid today.  It is mildly tender.   BUS: within normal limits,   Vagina: well rugated, no discharge,   Cervix: no lesions or CMT, friable after Pap smear  Uterus: firm, normal sized and nontender,   Adnexae: no masses or tenderness.  Rectovaginal - deferred.  Musculoskeletal - no gross deformities.  Neurological - normal strength, sensation, and mental status.      ASSESSMENT/PLAN:  (N76.4) Vulvar abscess  (primary encounter diagnosis)  Comment: Right periclitoral area  Plan: cephALEXin (KEFLEX) 500 MG capsule        3 times daily for 10 days    (Z01.419) Encounter for gynecological examination without abnormal finding  Comment:   Plan: Pap imaged thin layer screen with HPV -         recommended age 30 - 65 years (select " HPV order        below), HPV High Risk Types DNA Cervical                Letter will be sent to the referring provider.    Cheng Sims MD

## 2020-04-16 NOTE — LETTER
April 23, 2020    Yvette Weiss  [address] [address]    Dear ,  This letter is regarding your recent Pap smear (cervical cancer screening) and Human Papillomavirus (HPV) test.  We are happy to inform you that your Pap smear result is normal. Cervical cancer is closely linked with certain types of HPV. Your results showed no evidence of high-risk HPV.  We recommend you have your next PAP smear and HPV test in 5 years.  You will still need to return to the clinic every year for an annual exam and other preventive tests.  If you have additional questions regarding this result, please call our registered nurse, Suze at [phone].  Sincerely,    Cheng Sims MD //[facility]

## 2020-04-20 LAB
COPATH REPORT: NORMAL
PAP: NORMAL

## 2020-04-22 LAB
FINAL DIAGNOSIS: NORMAL
HPV HR 12 DNA CVX QL NAA+PROBE: NEGATIVE
HPV16 DNA SPEC QL NAA+PROBE: NEGATIVE
HPV18 DNA SPEC QL NAA+PROBE: NEGATIVE
SPECIMEN DESCRIPTION: NORMAL
SPECIMEN SOURCE CVX/VAG CYTO: NORMAL

## 2020-11-16 ENCOUNTER — HEALTH MAINTENANCE LETTER (OUTPATIENT)
Age: 35
End: 2020-11-16

## 2021-04-03 ENCOUNTER — HEALTH MAINTENANCE LETTER (OUTPATIENT)
Age: 36
End: 2021-04-03

## 2021-04-16 ENCOUNTER — MYC MEDICAL ADVICE (OUTPATIENT)
Dept: OBGYN | Facility: CLINIC | Age: 36
End: 2021-04-16

## 2021-04-16 DIAGNOSIS — N76.4 VULVAR ABSCESS: Primary | ICD-10-CM

## 2021-04-16 RX ORDER — AMOXICILLIN AND CLAVULANATE POTASSIUM 500; 125 MG/1; MG/1
1 TABLET, FILM COATED ORAL 2 TIMES DAILY
Qty: 20 TABLET | Refills: 0 | Status: SHIPPED | OUTPATIENT
Start: 2021-04-16 | End: 2021-04-26

## 2021-04-16 NOTE — TELEPHONE ENCOUNTER
RX Augmentin 500mg po BID x 10 days   Ilsa Wilson MD   [facility]       Pt notified of above.  Pt reports understanding.  Pt does not have further questions or concerns.  Patient will follow up in clinic if worsening or symptoms fail to improve.    Lou Finnegan   Ob[facility]Gyn Clinic  RN

## 2021-04-16 NOTE — TELEPHONE ENCOUNTER
Vulvar abscess 4/16/2020  Saw Dr. Levi   Keflex 500 mg TID for 10 days    Please review and advise on patient  MyChart message.  Appointment recommended?  Rx as requested?    Thanks,    Lou Finnegan   Ob/Gyn Clinic  RN

## 2021-09-18 ENCOUNTER — HEALTH MAINTENANCE LETTER (OUTPATIENT)
Age: 36
End: 2021-09-18

## 2022-04-30 ENCOUNTER — HEALTH MAINTENANCE LETTER (OUTPATIENT)
Age: 37
End: 2022-04-30

## 2022-11-19 ENCOUNTER — HEALTH MAINTENANCE LETTER (OUTPATIENT)
Age: 37
End: 2022-11-19

## 2022-12-06 ASSESSMENT — PATIENT HEALTH QUESTIONNAIRE - PHQ9
10. IF YOU CHECKED OFF ANY PROBLEMS, HOW DIFFICULT HAVE THESE PROBLEMS MADE IT FOR YOU TO DO YOUR WORK, TAKE CARE OF THINGS AT HOME, OR GET ALONG WITH OTHER PEOPLE: SOMEWHAT DIFFICULT
SUM OF ALL RESPONSES TO PHQ QUESTIONS 1-9: 5
SUM OF ALL RESPONSES TO PHQ QUESTIONS 1-9: 5

## 2022-12-08 ENCOUNTER — OFFICE VISIT (OUTPATIENT)
Dept: FAMILY MEDICINE | Facility: CLINIC | Age: 37
End: 2022-12-08
Payer: COMMERCIAL

## 2022-12-08 VITALS
HEART RATE: 90 BPM | DIASTOLIC BLOOD PRESSURE: 74 MMHG | HEIGHT: 62 IN | OXYGEN SATURATION: 98 % | RESPIRATION RATE: 16 BRPM | BODY MASS INDEX: 25.8 KG/M2 | SYSTOLIC BLOOD PRESSURE: 116 MMHG | WEIGHT: 140.2 LBS | TEMPERATURE: 97.9 F

## 2022-12-08 DIAGNOSIS — Z11.59 NEED FOR HEPATITIS C SCREENING TEST: ICD-10-CM

## 2022-12-08 DIAGNOSIS — R10.2 PELVIC PAIN IN FEMALE: ICD-10-CM

## 2022-12-08 DIAGNOSIS — N94.10 DYSPAREUNIA, FEMALE: Primary | ICD-10-CM

## 2022-12-08 DIAGNOSIS — R10.13 EPIGASTRIC PAIN: ICD-10-CM

## 2022-12-08 LAB
ALBUMIN SERPL BCG-MCNC: 4.7 G/DL (ref 3.5–5.2)
ALBUMIN UR-MCNC: NEGATIVE MG/DL
ALP SERPL-CCNC: 68 U/L (ref 35–104)
ALT SERPL W P-5'-P-CCNC: 19 U/L (ref 10–35)
ANION GAP SERPL CALCULATED.3IONS-SCNC: 6 MMOL/L (ref 7–15)
APPEARANCE UR: CLEAR
AST SERPL W P-5'-P-CCNC: 23 U/L (ref 10–35)
BASOPHILS # BLD AUTO: 0 10E3/UL (ref 0–0.2)
BASOPHILS NFR BLD AUTO: 1 %
BILIRUB SERPL-MCNC: 0.3 MG/DL
BILIRUB UR QL STRIP: NEGATIVE
BUN SERPL-MCNC: 15.9 MG/DL (ref 6–20)
CALCIUM SERPL-MCNC: 9.7 MG/DL (ref 8.6–10)
CHLORIDE SERPL-SCNC: 102 MMOL/L (ref 98–107)
COLOR UR AUTO: YELLOW
CREAT SERPL-MCNC: 0.78 MG/DL (ref 0.51–0.95)
DEPRECATED HCO3 PLAS-SCNC: 30 MMOL/L (ref 22–29)
EOSINOPHIL # BLD AUTO: 0.3 10E3/UL (ref 0–0.7)
EOSINOPHIL NFR BLD AUTO: 5 %
ERYTHROCYTE [DISTWIDTH] IN BLOOD BY AUTOMATED COUNT: 12.4 % (ref 10–15)
GFR SERPL CREATININE-BSD FRML MDRD: >90 ML/MIN/1.73M2
GLUCOSE SERPL-MCNC: 102 MG/DL (ref 70–99)
GLUCOSE UR STRIP-MCNC: NEGATIVE MG/DL
HCT VFR BLD AUTO: 40.3 % (ref 35–47)
HGB BLD-MCNC: 13.2 G/DL (ref 11.7–15.7)
HGB UR QL STRIP: NEGATIVE
KETONES UR STRIP-MCNC: NEGATIVE MG/DL
LEUKOCYTE ESTERASE UR QL STRIP: NEGATIVE
LIPASE SERPL-CCNC: 34 U/L (ref 13–60)
LYMPHOCYTES # BLD AUTO: 1.3 10E3/UL (ref 0.8–5.3)
LYMPHOCYTES NFR BLD AUTO: 24 %
MCH RBC QN AUTO: 28.7 PG (ref 26.5–33)
MCHC RBC AUTO-ENTMCNC: 32.8 G/DL (ref 31.5–36.5)
MCV RBC AUTO: 88 FL (ref 78–100)
MONOCYTES # BLD AUTO: 0.4 10E3/UL (ref 0–1.3)
MONOCYTES NFR BLD AUTO: 8 %
NEUTROPHILS # BLD AUTO: 3.5 10E3/UL (ref 1.6–8.3)
NEUTROPHILS NFR BLD AUTO: 63 %
NITRATE UR QL: NEGATIVE
PH UR STRIP: 5.5 [PH] (ref 5–7)
PLATELET # BLD AUTO: 196 10E3/UL (ref 150–450)
POTASSIUM SERPL-SCNC: 4.1 MMOL/L (ref 3.4–5.3)
PROT SERPL-MCNC: 7.7 G/DL (ref 6.4–8.3)
RBC # BLD AUTO: 4.6 10E6/UL (ref 3.8–5.2)
SODIUM SERPL-SCNC: 138 MMOL/L (ref 136–145)
SP GR UR STRIP: 1.01 (ref 1–1.03)
UROBILINOGEN UR STRIP-ACNC: 0.2 E.U./DL
WBC # BLD AUTO: 5.5 10E3/UL (ref 4–11)

## 2022-12-08 PROCEDURE — 83690 ASSAY OF LIPASE: CPT | Performed by: FAMILY MEDICINE

## 2022-12-08 PROCEDURE — 85025 COMPLETE CBC W/AUTO DIFF WBC: CPT | Performed by: FAMILY MEDICINE

## 2022-12-08 PROCEDURE — 87591 N.GONORRHOEAE DNA AMP PROB: CPT | Performed by: FAMILY MEDICINE

## 2022-12-08 PROCEDURE — 99214 OFFICE O/P EST MOD 30 MIN: CPT | Performed by: FAMILY MEDICINE

## 2022-12-08 PROCEDURE — 81003 URINALYSIS AUTO W/O SCOPE: CPT | Performed by: FAMILY MEDICINE

## 2022-12-08 PROCEDURE — 87491 CHLMYD TRACH DNA AMP PROBE: CPT | Performed by: FAMILY MEDICINE

## 2022-12-08 PROCEDURE — 36415 COLL VENOUS BLD VENIPUNCTURE: CPT | Performed by: FAMILY MEDICINE

## 2022-12-08 PROCEDURE — 80053 COMPREHEN METABOLIC PANEL: CPT | Performed by: FAMILY MEDICINE

## 2022-12-08 ASSESSMENT — ENCOUNTER SYMPTOMS: ABDOMINAL PAIN: 1

## 2022-12-08 ASSESSMENT — PATIENT HEALTH QUESTIONNAIRE - PHQ9
10. IF YOU CHECKED OFF ANY PROBLEMS, HOW DIFFICULT HAVE THESE PROBLEMS MADE IT FOR YOU TO DO YOUR WORK, TAKE CARE OF THINGS AT HOME, OR GET ALONG WITH OTHER PEOPLE: SOMEWHAT DIFFICULT
SUM OF ALL RESPONSES TO PHQ QUESTIONS 1-9: 5

## 2022-12-08 ASSESSMENT — PAIN SCALES - GENERAL: PAINLEVEL: MILD PAIN (3)

## 2022-12-08 NOTE — PROGRESS NOTES
Assessment & Plan     Dyspareunia, female  Chronic. No full evaluation yet per patient reporr, and per her records.  DDx: scar from vaginal tears during vaginal birth, endometriosis, occult uterine/ovarian disease, infection (less likely).  Discussed to rule out occult infection, and to obtain pelvic imaging. Patient concurred.  Exam today did not reveal acute findings.  - Ob/Gyn Referral  - Chlamydia trachomatis/Neisseria gonorrhoeae by PCR  - UA reflex to Microscopic  - UA reflex to Microscopic  - Chlamydia trachomatis/Neisseria gonorrhoeae by PCR    Pelvic pain in female  See above  - Ob/Gyn Referral  - Chlamydia trachomatis/Neisseria gonorrhoeae by PCR  - UA reflex to Microscopic  - CBC with Platelets & Differential  - CBC with Platelets & Differential  - UA reflex to Microscopic  - Chlamydia trachomatis/Neisseria gonorrhoeae by PCR    Epigastric pain  No acute abdomen.  DDx: GERD, cholelithiasis, MSK, pancreatic disease, colitis. Not suspect for peritonitis or diverticulitis.  Labs to rule out occult pathology.  Consider abdominal imaging as appropriate.  Advised trial of PPI or famotidine. Patient preferred to buy otc.  Return precautions discussed and given to patient.   - CBC with Platelets & Differential  - Lipase  - Comprehensive metabolic panel  - Comprehensive metabolic panel  - Lipase  - CBC with Platelets & Differential    Need for hepatitis C screening test  Patient said she knows she had been sreened during pregnancy. She deferred testing at this time.      Patient Instructions   Still unclear cause of the pelvic and abdominal pain.    Considerations include scar from tears during delivery, endometriosis, other ovarian or uterus abnormalities.    Upper abdominal pain causes may include acid reflux, gallstones, pancreas disease, other digestive tract conditions.  Not highly suspect that pain is from umbilical hernia.    Labs ordered to day to rule out possible causes.  You will be contacted in 1-2  days for results of your lab tests.     To schedule the pelvic ultrasound , call [phone].     Referral to gynecology has been signed. Schedulers will call you in the next 3-5 business days.     Read more information about your conditions in the handouts attached to this visit summary.       Return in about 1 month (around 1/8/2023) for see provider again if with worsening or new symptoms.    Nemesio Perkins MD  [facility] is a 37 year [facility] presenting for the following health issues:  Abdominal Pain (Pt being seen for abdominal pain.), Pain (Pt also having pain with intercourse.), and Derm Problem (Pt also has a sore by her anus she would like checked, not healing.)      Abdominal Pain     Pain  Associated symptoms include abdominal pain.   History of Present Illness       Reason for visit:  Pain in abdomen, pain during sex, soar on bottom, blood in stool  Symptom onset:  More than a month  Symptom intensity:  Moderate  Symptom progression:  Staying the same  Had these symptoms before:  No    She eats 0-1 servings of fruits and vegetables daily.She consumes 1 sweetened beverage(s) daily.She exercises with enough effort to increase her heart rate 9 or less minutes per day.  She exercises with enough effort to increase her heart rate 3 or less days per week.   She is taking medications regularly.    Today's PHQ-9         PHQ-9 Total Score: 5    PHQ-9 Q9 Thoughts of better off dead/self-harm past 2 weeks :   Not at all    How difficult have these problems made it for you to do your work, take care of things at home, or get along with other people: Somewhat difficult       Concern - pain with intercourse   Onset: worse after each child she has had, last child was 4 years ago, 1st one 8 years ago   Description: Pain during intercourse, has gotten worse after each child.  Intensity: 5/10  Progression of Symptoms:  worsening  Accompanying Signs & Symptoms: none  Previous  "history of similar problem: prior to childbirth no problems  Precipitating factors:        Worsened by: intercourse  Alleviating factors:        Improved by: none  Therapies tried and outcome:  differrent positions    Pain History:  When did you first notice your pain? - Acute Pain   Have you seen anyone else for your pain? Yes - seen at [facility] this past summer  Where in your body do you have pain? Abdominal/Flank Pain  Onset/Duration: past 8 years  Description:   Character: Dull ache  Location: epigastric region pelvic region  Radiation: goes up under ribs also  Intensity: 3-5/10  Progression of Symptoms:  worsening and constant  Accompanying Signs & Symptoms:  Fever/Chills: No  Gas/Bloating: No  Nausea: YES- sometimes  Vomitting: No  Diarrhea: No  Constipation: YES- irregular bm  Dysuria or Hematuria: No  Possible blood in stool? Rust colored  History:   Trauma: YES- umbilical hernia, not repaired  Previous similar pain: struggled with constipation  Previous tests done: none, some lab work, iron studies  Precipitating factors:   Does the pain change with:     Food: YES- with overeating, coffee makes stomach hurt    Bowel Movement: No    Urination: No   Other factors:  No  Therapies tried and outcome: None    Patient's last menstrual period was 12/03/2022 (exact date).  Patient reports regular menstrual period intervals.      Concern - sore in anus  Onset: past couple of months  Description: reports sore \"on the space between my vagina and anus\" that is not healing; pain when pressure is applied.  Intensity: mild  Progression of Symptoms:  same and constant  Accompanying Signs & Symptoms: itching all the time   Previous history of similar problem: none  Precipitating factors:        Worsened by: having bm  Alleviating factors:        Improved by: none  Therapies tried and outcome: tucks, aquaphor - did not heal the sore      Review of Systems   Gastrointestinal: Positive for abdominal pain.      Constitutional, " "HEENT, cardiovascular, pulmonary, GI, , musculoskeletal, neuro, skin, endocrine and psych systems are negative, except as otherwise noted.      Objective    /74   Pulse 90   Temp 97.9  F (36.6  C) (Tympanic)   Resp 16   Ht 1.575 m (5' 2\")   Wt 63.6 kg (140 lb 3.2 oz)   LMP 12/03/2022 (Exact Date)   SpO2 98%   BMI 25.64 kg/m    Body mass index is 25.64 kg/m .  Physical Exam   GENERAL: healthy, alert and no distress, ambulatory w/o assist  SKIN: no suspicious lesions, no rashes    ABD:  Flat, no distension, no guarding, mild diffuse  tenderness , no mass palpable    SPECULUM EXAM: normal external genitalia, normal vaginal mucosae, np vaginal discharge; cervix visualized fully smooth with no blood per os    BIMANUAL EXAM: no CMT, uterus small but patient reported mild TTP; mild left adnexal tenderness, mild  right adnexal tenderness; no adnexal mass on either side    Results for orders placed or performed in visit on 12/08/22   Comprehensive metabolic panel     Status: Abnormal   Result Value Ref Range    Sodium 138 136 - 145 mmol/L    Potassium 4.1 3.4 - 5.3 mmol/L    Chloride 102 98 - 107 mmol/L    Carbon Dioxide (CO2) 30 (H) 22 - 29 mmol/L    Anion Gap 6 (L) 7 - 15 mmol/L    Urea Nitrogen 15.9 6.0 - 20.0 mg/dL    Creatinine 0.78 0.51 - 0.95 mg/dL    Calcium 9.7 8.6 - 10.0 mg/dL    Glucose 102 (H) 70 - 99 mg/dL    Alkaline Phosphatase 68 35 - 104 U/L    AST 23 10 - 35 U/L    ALT 19 10 - 35 U/L    Protein Total 7.7 6.4 - 8.3 g/dL    Albumin 4.7 3.5 - 5.2 g/dL    Bilirubin Total 0.3 <=1.2 mg/dL    GFR Estimate >90 >60 mL/min/1.73m2   Lipase     Status: Normal   Result Value Ref Range    Lipase 34 13 - 60 U/L   UA reflex to Microscopic     Status: Normal   Result Value Ref Range    Color Urine Yellow Colorless, Straw, Light Yellow, Yellow    Appearance Urine Clear Clear    Glucose Urine Negative Negative mg/dL    Bilirubin Urine Negative Negative    Ketones Urine Negative Negative mg/dL    Specific " Gravity Urine 1.010 1.003 - 1.035    Blood Urine Negative Negative    pH Urine 5.5 5.0 - 7.0    Protein Albumin Urine Negative Negative mg/dL    Urobilinogen Urine 0.2 0.2, 1.0 E.U./dL    Nitrite Urine Negative Negative    Leukocyte Esterase Urine Negative Negative    Narrative    Microscopic not indicated   CBC with platelets and differential     Status: None   Result Value Ref Range    WBC Count 5.5 4.0 - 11.0 10e3/uL    RBC Count 4.60 3.80 - 5.20 10e6/uL    Hemoglobin 13.2 11.7 - 15.7 g/dL    Hematocrit 40.3 35.0 - 47.0 %    MCV 88 78 - 100 fL    MCH 28.7 26.5 - 33.0 pg    MCHC 32.8 31.5 - 36.5 g/dL    RDW 12.4 10.0 - 15.0 %    Platelet Count 196 150 - 450 10e3/uL    % Neutrophils 63 %    % Lymphocytes 24 %    % Monocytes 8 %    % Eosinophils 5 %    % Basophils 1 %    Absolute Neutrophils 3.5 1.6 - 8.3 10e3/uL    Absolute Lymphocytes 1.3 0.8 - 5.3 10e3/uL    Absolute Monocytes 0.4 0.0 - 1.3 10e3/uL    Absolute Eosinophils 0.3 0.0 - 0.7 10e3/uL    Absolute Basophils 0.0 0.0 - 0.2 10e3/uL   CBC with Platelets & Differential     Status: None    Narrative    The following orders were created for panel order CBC with Platelets & Differential.  Procedure                               Abnormality         Status                     ---------                               -----------         ------                     CBC with platelets and d...[[id number]]                      Final result                 Please view results for these tests on the individual orders.

## 2022-12-09 LAB
C TRACH DNA SPEC QL PROBE+SIG AMP: NEGATIVE
N GONORRHOEA DNA SPEC QL NAA+PROBE: NEGATIVE

## 2022-12-13 ENCOUNTER — TELEPHONE (OUTPATIENT)
Dept: FAMILY MEDICINE | Facility: CLINIC | Age: 37
End: 2022-12-13

## 2022-12-13 DIAGNOSIS — N94.10 DYSPAREUNIA, FEMALE: Primary | ICD-10-CM

## 2022-12-13 DIAGNOSIS — R10.2 PELVIC PAIN IN FEMALE: ICD-10-CM

## 2022-12-13 NOTE — TELEPHONE ENCOUNTER
Order/Referral Request    Who is requesting: Patient    Orders being requested: Patient needs pelvic ultrasound ordered, patient saw Dr. Perkins on 12/8/22 and he told patient he would order this for her      Reason service is needed/diagnosis: pelvic pain    When are orders needed by: asap    Has this been discussed with Provider: Yes    Does patient have a preference on a Group/Provider/Facility? [facility] [place]    Does patient have an appointment scheduled?: No    Where to send orders: In chart    Could we send this information to you in [facility] or would you prefer to receive a phone call?:   Patient would prefer a phone call   Okay to leave a detailed message?: Yes at Home number on file [phone] (home)

## 2022-12-13 NOTE — TELEPHONE ENCOUNTER
Forwarding below request for pelvic ultrasound order to provider. Notes from 12/8/22 visit to indicate this would be ordered, in addition to OB/GYN referral, but RN not seeing any orders or referral for ultrasound.  Would provider like to order this?    Arelis Messer RN  [facility]

## 2022-12-13 NOTE — TELEPHONE ENCOUNTER
Patient notified with acknowledgement and has imaging scheduling number.     Arelis Messer RN  [facility]

## 2022-12-27 ENCOUNTER — HOSPITAL ENCOUNTER (OUTPATIENT)
Dept: ULTRASOUND IMAGING | Facility: CLINIC | Age: 37
Discharge: HOME | End: 2022-12-27
Attending: FAMILY MEDICINE | Admitting: FAMILY MEDICINE
Payer: COMMERCIAL

## 2022-12-27 DIAGNOSIS — N94.10 DYSPAREUNIA, FEMALE: ICD-10-CM

## 2022-12-27 DIAGNOSIS — R10.2 PELVIC PAIN IN FEMALE: ICD-10-CM

## 2022-12-27 PROCEDURE — 76830 TRANSVAGINAL US NON-OB: CPT

## 2022-12-28 ENCOUNTER — MYC MEDICAL ADVICE (OUTPATIENT)
Dept: FAMILY MEDICINE | Facility: CLINIC | Age: 37
End: 2022-12-28

## 2023-02-09 ENCOUNTER — OFFICE VISIT (OUTPATIENT)
Dept: OBGYN | Facility: CLINIC | Age: 38
End: 2023-02-09
Payer: COMMERCIAL

## 2023-02-09 VITALS
BODY MASS INDEX: 26.02 KG/M2 | RESPIRATION RATE: 14 BRPM | HEIGHT: 62 IN | WEIGHT: 141.4 LBS | TEMPERATURE: 98.5 F | SYSTOLIC BLOOD PRESSURE: 103 MMHG | HEART RATE: 86 BPM | DIASTOLIC BLOOD PRESSURE: 70 MMHG

## 2023-02-09 DIAGNOSIS — R10.13 ABDOMINAL PAIN, EPIGASTRIC: Primary | ICD-10-CM

## 2023-02-09 DIAGNOSIS — R10.2 PELVIC PAIN IN FEMALE: ICD-10-CM

## 2023-02-09 DIAGNOSIS — N94.10 DYSPAREUNIA IN FEMALE: ICD-10-CM

## 2023-02-09 PROCEDURE — 99214 OFFICE O/P EST MOD 30 MIN: CPT | Performed by: OBSTETRICS & GYNECOLOGY

## 2023-02-09 RX ORDER — NORGESTIMATE AND ETHINYL ESTRADIOL 0.25-0.035
1 KIT ORAL DAILY
Qty: 28 TABLET | Refills: 3 | Status: SHIPPED | OUTPATIENT
Start: 2023-02-09 | End: 2023-02-09

## 2023-02-09 RX ORDER — NORGESTIMATE AND ETHINYL ESTRADIOL 0.25-0.035
1 KIT ORAL DAILY
Qty: 28 TABLET | Refills: 3 | Status: SHIPPED | OUTPATIENT
Start: 2023-02-09 | End: 2023-03-08

## 2023-02-09 NOTE — PROGRESS NOTES
[facility]    Gynecology Consult Note    CC:     Chief Complaint   Patient presents with     Consult       HPI: Yvette Weiss is a 38 year old  being seen for GYN consultation for pelvic pain by the request of her provider, Dr. Nemesio Perkins. Today she reports two types of pelvic pain. She first describes a vague, dull pain in her lower abdomen. She has pain all over her abdomen, more severe in upper quadrants and epigastric region and then more dull in lower quadrants. Not having any other associated symptoms. The pain is not cyclic or associated with her period. Also complaining of deep dyspareunia. This pain is more sharp and only present with intercourse. Not positional, no pain with initial penetration, no concerns for lubrication.     GYN Hx:     Patient's last menstrual period was 2023 (exact date).    Cycle: regular, monthly  Duration: 5-6 days  Dysmenorrhea: denies  Contraception: condoms   Last Pap Smear: 2020 NIL, HPV negative      ROS:  Denies nausea, vomiting, diarrhea, constipation, fever, chills, dysuria, urinary frequency, vaginal discharge or pain, or menstrual irregularities.   +blood in stool on 2 occasions    PMH:   Past Medical History:   Diagnosis Date     Anxiety     as teenager     ASCUS on Pap smear     neg for High Risk HPV     Chickenpox     x2     Depression     as teenager     Gestational diabetes 2016     Lateral epicondylitis 2004     Postpartum depression 2014    mild       PSHx:   Past Surgical History:   Procedure Laterality Date     ORTHOPEDIC SURGERY      ORIF left wrist       OBHx:   OB History    Para Term  AB Living   3 3 3 0 0 3   SAB IAB Ectopic Multiple Live Births   0 0 0 0 3      # Outcome Date GA Lbr Yaron/2nd Weight Sex Delivery Anes PTL Lv   3 Term 18 40w5d 02:45 / 00:19 3.565 kg (7 lb 13.8 oz) F Vag-Spont EPI N TRUDY      Name: SHERRIE,BABYRahul BARRETO      Apgar1: 8  Apgar5: 9   2 Term 16 41w0d  03:40 / 00:04 4.423 kg (9 lb 12 oz) F Vag-Spont IV N TRUDY      Apgar1: 7  Apgar5: 9   1 Term 14 40w6d 17:50 / 01:06 3.742 kg (8 lb 4 oz) M Vag-Spont EPI N TRUDY      Name: Suresh      Apgar1: 8  Apgar5: 9       Medications:   Cholecalciferol (VITAMIN D PO), Take 1,000 Units by mouth daily     No current facility-administered medications on file prior to visit.      Allergies:   Allergies   Allergen Reactions     Latex Hives       Social History:   Social History     Socioeconomic History     Marital status:      Spouse name: Wesly     Number of children: 1     Years of education: Not on file     Highest education level: Not on file   Occupational History     Not on file   Tobacco Use     Smoking status: Former     Types: Cigarettes     Quit date: 2010     Years since quittin.0     Smokeless tobacco: Never   Vaping Use     Vaping Use: Never used   Substance and Sexual Activity     Alcohol use: Yes     Comment: occ     Drug use: No     Sexual activity: Not Currently     Partners: Male   Other Topics Concern     Parent/sibling w/ CABG, MI or angioplasty before 65F 55M? No     Comment: Adopted   Social History Narrative     Not on file     Social Determinants of Health     Financial Resource Strain: Not on file   Food Insecurity: Not on file   Transportation Needs: Not on file   Physical Activity: Not on file   Stress: Not on file   Social Connections: Not on file   Intimate Partner Violence: Not on file   Housing Stability: Not on file         Family History:   Family History   Adopted: Yes   Problem Relation Age of Onset     Alcohol/Drug Mother         A&D     Alcohol/Drug Father         A&D     Parkinsonism Paternal Grandmother      Cerebrovascular Disease Maternal Grandmother         x3     Other - See Comments Sister         fetal alcohol syndrome     Cancer Maternal Grandfather         lung     Other Cancer Paternal Grandfather         Lung       Physical Exam:   Vitals:    23 1328   BP:  "103/70   BP Location: Left arm   Patient Position: Sitting   Cuff Size: Adult Regular   Pulse: 86   Resp: 14   Temp: 98.5  F (36.9  C)   TempSrc: Tympanic   Weight: 64.1 kg (141 lb 6.4 oz)   Height: 1.575 m (5' 2\")      Estimated body mass index is 25.86 kg/m  as calculated from the following:    Height as of this encounter: 1.575 m (5' 2\").    Weight as of this encounter: 64.1 kg (141 lb 6.4 oz).    General appearance: well-hydrated, A&O x 3, no apparent distress  Lungs: Equal expansion bilaterally, no accessory muscle use  Heart: No heaves or thrills.   Constitutional: See vitals  Extremities: no edema  Neuro: CN II-XII grossly intact  Genitourinary:  External genitalia: no erythema, no lesions.   Bladder no fullness, masses, or tenderness.  Anus and Perineum: +external hemorrhoids  Vagina: Normal, healthy pink mucosa without any lesions. Physiologic vaginal discharge.   Cervix: normal appearance, no cervical motion tenderness.   Uterus: normal size, shape and consistency.   Adnexa: no masses or tenderness bilaterally.      Labs/Imaging: Reviewed images from pelvic US from 12/2022. Normal pelvic US, small corpus luteum cyst. Uterus normal with smooth endometrium, no fibroids.    Assessment and Plan:     Encounter Diagnoses   Name Primary?     Abdominal pain, epigastric Yes     Pelvic pain in female      Dyspareunia in female      Discussed patient's symptoms and possible etiologies. Uncertain if her pelvic discomfort is GYN in origin due to lack of cyclic nature of symptoms, normal pelvic US, normal exam. She is having dyspareunia, possible adenomyosis vs. Other. Offered hormonal management for empiric treatment to see if she has any improvement of symptoms. She would like to try this, discussed options and will try OCPs. Also briefly discussed diagnostic laparoscopy as possibility if severe or refractory symptoms.     Patient complaining of epigastric pain and has a history of blood in stool on 2 occasions. " External hemorrhoids present on exam and possible source of bleeding. Discussed options with patient and GI referral placed for evaluation.     Health maintenance: pap smear up to date  Return to clinic as needed.    Mana Rock DO

## 2023-02-09 NOTE — NURSING NOTE
"Initial /70 (BP Location: Left arm, Patient Position: Sitting, Cuff Size: Adult Regular)   Pulse 86   Temp 98.5  F (36.9  C) (Tympanic)   Resp 14   Ht 1.575 m (5' 2\")   Wt 64.1 kg (141 lb 6.4 oz)   LMP 01/24/2023 (Exact Date)   BMI 25.86 kg/m   Estimated body mass index is 25.86 kg/m  as calculated from the following:    Height as of this encounter: 1.575 m (5' 2\").    Weight as of this encounter: 64.1 kg (141 lb 6.4 oz). .      "

## 2023-03-08 DIAGNOSIS — R10.2 PELVIC PAIN IN FEMALE: ICD-10-CM

## 2023-03-08 DIAGNOSIS — N94.10 DYSPAREUNIA IN FEMALE: ICD-10-CM

## 2023-03-08 RX ORDER — NORGESTIMATE AND ETHINYL ESTRADIOL 0.25-0.035
1 KIT ORAL DAILY
Qty: 28 TABLET | Refills: 3 | Status: SHIPPED | OUTPATIENT
Start: 2023-03-08 | End: 2023-03-16

## 2023-03-08 NOTE — TELEPHONE ENCOUNTER
Pending Prescriptions:                       Disp   Refills    norgestimate-ethinyl estradiol (ORTHO-CYC*28 tab*3            Sig: Take 1 tablet by mouth daily

## 2023-03-13 ENCOUNTER — OFFICE VISIT (OUTPATIENT)
Dept: GASTROENTEROLOGY | Facility: CLINIC | Age: 38
End: 2023-03-13
Attending: OBSTETRICS & GYNECOLOGY
Payer: COMMERCIAL

## 2023-03-13 VITALS
HEIGHT: 62 IN | BODY MASS INDEX: 25.76 KG/M2 | WEIGHT: 140 LBS | SYSTOLIC BLOOD PRESSURE: 102 MMHG | DIASTOLIC BLOOD PRESSURE: 70 MMHG

## 2023-03-13 DIAGNOSIS — R10.13 ABDOMINAL PAIN, EPIGASTRIC: ICD-10-CM

## 2023-03-13 DIAGNOSIS — R19.8 ALTERNATING CONSTIPATION AND DIARRHEA: ICD-10-CM

## 2023-03-13 DIAGNOSIS — R11.0 NAUSEA: Primary | ICD-10-CM

## 2023-03-13 PROCEDURE — 99204 OFFICE O/P NEW MOD 45 MIN: CPT | Performed by: NURSE PRACTITIONER

## 2023-03-13 ASSESSMENT — PAIN SCALES - GENERAL: PAINLEVEL: MILD PAIN (3)

## 2023-03-13 NOTE — LETTER
"    3/13/2023         RE: Yvette Weiss  [address] [address]        Dear Colleague,    Thank you for referring your patient, Yvette Weiss, to the [facility]. Please see a copy of my visit note below.    Gastroenterology CLINIC VISIT, NEW PATIENT    CC/REFERRING PROVIDER: Mana Rock  REASON FOR CONSULTATION: abdominal pain    HPI: 38 year old female was referred to GI clinic by gynecologist for evaluation of abdominal discomfort. Ongoing issue but got worse over the past year. Pain is all over her abdomen, more severe in upper quadrants and epigastric region and then more dull in lower quadrants. Some substernal discomfort. Stated that the pain varies by intensity and character and could be cramping, dull, cuca, and sharp. Pain is also migrates through the abdomen. Not related to food intake or bowel/bladder elimination.  Stated that she had acid reflux during her pregnancy, but no issues anymore. No dysphagia symptoms.  Bowel habit consists of alternation of diarrhea and constipation and th pattern has always been irregular, \"goes in cycles\". Has up to 2-3 mushy stools during her diarrhea episodes. No black stools or blood in stools.  No weight loss. No changes in appetite. No chronic NSAIDs use.      ROS: 10pt ROS performed and otherwise negative.    PAST MEDICAL HISTORY:  Past Medical History:   Diagnosis Date     Anxiety     as teenager     ASCUS on Pap smear 2011    neg for High Risk HPV     Chickenpox     x2     Depression     as teenager     Gestational diabetes 2016     Lateral epicondylitis 9/14/2004     Postpartum depression 4/2014    mild       PREVIOUS ABDOMINAL/GYNECOLOGIC SURGERIES:    Past Surgical History:   Procedure Laterality Date     ORTHOPEDIC SURGERY  1997    ORIF left wrist         PERTINENT MEDICATIONS:  Current Outpatient Medications   Medication Sig Dispense Refill     Cholecalciferol (VITAMIN D PO) Take 1,000 Units by mouth " daily        norgestimate-ethinyl estradiol (ORTHO-CYCLEN) 0.25-35 MG-MCG tablet Take 1 tablet by mouth daily 28 tablet 3       No other OTC/herbal/supplements reported by patient.    SOCIAL HISTORY:  Social History     Socioeconomic History     Marital status:      Spouse name: Wesly     Number of children: 1     Years of education: Not on file     Highest education level: Not on file   Occupational History     Not on file   Tobacco Use     Smoking status: Former     Types: Cigarettes     Quit date: 2010     Years since quittin.1     Smokeless tobacco: Never   Vaping Use     Vaping Use: Never used   Substance and Sexual Activity     Alcohol use: Yes     Comment: occ     Drug use: No     Sexual activity: Not Currently     Partners: Male   Other Topics Concern     Parent/sibling w/ CABG, MI or angioplasty before 65F 55M? No     Comment: Adopted   Social History Narrative     Not on file     Social Determinants of Health     Financial Resource Strain: Not on file   Food Insecurity: Not on file   Transportation Needs: Not on file   Physical Activity: Not on file   Stress: Not on file   Social Connections: Not on file   Intimate Partner Violence: Not on file   Housing Stability: Not on file       FAMILY HISTORY:  Denies colon/panc/esophageal/other GI CA, no other Rodriguez or other HPS-related Juvencio. No IBD/celiac, no other AI/liver/thyroid disease.    Family History   Adopted: Yes   Problem Relation Age of Onset     Alcohol/Drug Mother         A&D     Alcohol/Drug Father         A&D     Parkinsonism Paternal Grandmother      Cerebrovascular Disease Maternal Grandmother         x3     Other - See Comments Sister         fetal alcohol syndrome     Cancer Maternal Grandfather         lung     Other Cancer Paternal Grandfather         Lung       PHYSICAL EXAMINATION:  Vitals reviewed  LMP 2023 (Exact Date)     General: Patient appears well in no acute distress.   Skin: No visualized rash or lesions on  visualized skin  Eyes: EOMI, no erythema, sclera icterus or discharge noted  Resp: breathing comfortably without accessory muscle usage, speaking in full sentences, no cough  Lung sounds clear  Card: Regular and rhythmic S1 and S2. No murmur,gallop, or rub  Abdomen: Hypoactive bowel sounds X 4 quadrants.Reported diffuse abdominal tenderness on palpation, worse in periumbilical area and RUQ.  Possible umbilical hernia? Soft to palpation, no guarding or rebound tenderness.   MSK: Appears to have normal range of motion based on visualized movements  Neurologic: No apparent tremors, facial movements symmetric  Psych: affect normal, alert and oriented      PERTINENT STUDIES Reviewed in EMR    ASSESSMENT/PLAN:  38 year old female  presented  to GI clinic for evaluation of ongoing abdominal pain for many year, but started getting more intense and frequent in the past year. Pain if diffused, worse in upper abdomen and harvey-umbilical area. Complains of altered stool pattern ranging from diarrhea to constipation. No history of abdominal surgeries. Not on chronic NSAIDs..  We discussed differential diagnosis. Explained to the patient that her symptoms are likely related to IBS, mixed type. Suspect a small periumbilical area. Offer further evaluation of other possible etiologies.  Will proceed with upper GI endoscopy and CT of abdomen.  Recommended to start a low dose of fiber and gradually taper the dose up as tolerates.      ICD-10-CM    1. Nausea  R11.0 Adult GI  Referral - Procedure Only      2. Abdominal pain, epigastric  R10.13 Adult GI  Referral - Consult Only     Adult GI  Referral - Procedure Only     CT Abdomen w/o & w Contrast      3. Alternating constipation and diarrhea  R19.8 Adult GI  Referral - Procedure Only     CT Abdomen w/o & w Contrast        Patient verbalized understanding and appreciation of care provided. Stated that all of the questions were answered to her/his  satisfaction.    Additional 15 minutes on the date of service was spent performing the following:   -Preparing to see the patient (eg, review of tests,providers progress notes, medical/surgical history,etc)   -Ordering medications, tests, or procedures   -Documenting clinical information in the electronic or other health record     RTC in 3 months    Thank you for this consultation. It was a pleasure to participate in the care of this patient; please contact us with any further questions.    JUNE Cloud, GENOVEVAP-C  [facility]  Gastroenterology Department  [place], [place]    This note was created with Dragon voice recognition software, and while reviewed for accuracy, inadvertent minor typographic errors may occur. Please contact the provider if you have any questions.      Again, thank you for allowing me to participate in the care of your patient.        Sincerely,        JUNE CLOUD CNP

## 2023-03-13 NOTE — PATIENT INSTRUCTIONS
It was a pleasure taking care of you today.  I've included a brief summary of our discussion and care plan from today's visit below.  Please review this information with your primary care provider.  ______________________________________________________________________    My recommendations are summarized as follows:    As we discussed today, let's proceed with upper GI endoscopy for evaluation of your abdominal pain and nausea.    2. CT scan was also ordered to check for hernia and causes of diarrhea/ constipation issue and abdominal pain.    3. Start a fiber supplement with a small dose and gradually taper it up as tolerate. It should help to regulate your stools.    Return to GI Clinic in 3 month to review your progress.    ______________________________________________________________________    A high-fiber diet is a commonly recommended treatment for digestive problems, such as constipation, diarrhea, and hemorrhoids.   Most dietary fiber is not digested or absorbed, so it stays within the intestine where it modulates digestion of other foods and affects the consistency of stool. There are two types of fiber, each of which is thought to have its own benefits:  ?Soluble fiber consists of a group of substances that is made of carbohydrates and dissolves in water. Examples of foods that contain soluble fiber include fruits, oats, barley, and legumes (peas and beans).  ?Insoluble fiber comes from plant cell walls and does not dissolve in water. Examples of foods that contain insoluble fiber include wheat, rye, and other grains. Insoluble fiber (wheat bran, and some fruits and vegetables) has been recommended to treat digestive problems such as constipation, hemorrhoids, chronic diarrhea, and fecal incontinence.   ?Dietary fiber is the sum of all soluble and insoluble fiber.Fiber bulks the stool, making it softer and easier to pass. Fiber helps the stool pass regularly, although it is not a laxative.   -  Recommended daily dose of fiber is 25-35 gram. It is difficult to consume this amount of fiber from food alone. Therefore, I would suggest to take fiber supplement.  - Please start supplementation with a powdered soluble fiber. When used on a daily basis, this can help regulate the consistency of your stools.   - Metamucil (psyllium) and Citrucel are preferred examples. You can start with 1-2 teaspoons per day, with goal to gradually increase the dose  to 1 tablespoon daily. You can increase up to 1 tablespoon three times daily if needed.   - It is important to stay well-hydrated with use of fiber supplementation and to make sure that the fiber powder is well mixed with water as directed on the label.   - You may experience some bloating with initiation of fiber, which will improve over the first few weeks. We will evaluate the effect  of fiber in 3-6 months.   - Of note, many of the fiber products contain artificial sweeteners, which can cause bloating, gas, and diarrhea in those who may be sensitive to artificial sweeteners. If this is the case, I would recommend trying Metamucil Premium Blend (with Stevia), Metamucil 4-in-1 without Added Sweeteners, or Bellway (sweetened with Monk fruit extract).     ______________________________________________________________________    Who do I call with any questions after my visit?  Please be in touch if there are any further questions that arise following today's visit.  There are multiple ways to contact your gastroenterology care team.      During business hours, you may reach a Gastroenterology nurse at [phone], option 3.     To schedule or reschedule an appointment, please call [phone].   To schedule your imaging studies (CT, MRI, ultrasound)  call [phone] (or toll-free # [phone])  To schedule your lab work at [facility], please call [phone]    You can always send a secure message through Embedded Chat.  Fineline  are answered by your nurse or doctor typically within 24 hours.  Please allow extra time on weekends and holidays.      For urgent/emergent questions after business hours, you may reach the on-call GI Fellow by contacting the [facility]  at [phone].    In order for your refill to be processed in a timely fashion, it is your responsibility to ensure you follow the recommendations from your provider regarding your laboratory studies and follow up appointments.       How will I get the results of any tests ordered?    You will receive all of your results.  If you have signed up for [email or web address], any tests ordered at your visit will be available to you after your physician reviews them.  Typically this takes 1-2 weeks.  If there are urgent results that require a change in your care plan, your physician or nurse will call you to discuss the next steps.   What is [employer]?  [employer] is a secure way for you to access all of your healthcare records from the [facility].  It is a web based computer program, so you can sign on to it from any location.  It also allows you to send secure messages to your care team.  I recommend signing up for [employer] access if you have not already done so and are comfortable with using a computer.    How to I schedule a follow-up visit?  If you did not schedule a follow-up visit today, please call [phone] to schedule a follow-up office visit.      Sincerely,  FAB Cloud,  [facility],  Division of Gastroenterology   ([facility])

## 2023-03-13 NOTE — PROGRESS NOTES
"Gastroenterology CLINIC VISIT, NEW PATIENT    CC/REFERRING PROVIDER: Mana Rock  REASON FOR CONSULTATION: abdominal pain    HPI: 38 year old female was referred to GI clinic by gynecologist for evaluation of abdominal discomfort. Ongoing issue but got worse over the past year. Pain is all over her abdomen, more severe in upper quadrants and epigastric region and then more dull in lower quadrants. Some substernal discomfort. Stated that the pain varies by intensity and character and could be cramping, dull, cuca, and sharp. Pain is also migrates through the abdomen. Not related to food intake or bowel/bladder elimination.  Stated that she had acid reflux during her pregnancy, but no issues anymore. No dysphagia symptoms.  Bowel habit consists of alternation of diarrhea and constipation and th pattern has always been irregular, \"goes in cycles\". Has up to 2-3 mushy stools during her diarrhea episodes. No black stools or blood in stools.  No weight loss. No changes in appetite. No chronic NSAIDs use.      ROS: 10pt ROS performed and otherwise negative.    PAST MEDICAL HISTORY:  Past Medical History:   Diagnosis Date     Anxiety     as teenager     ASCUS on Pap smear 2011    neg for High Risk HPV     Chickenpox     x2     Depression     as teenager     Gestational diabetes 2016     Lateral epicondylitis 9/14/2004     Postpartum depression 4/2014    mild       PREVIOUS ABDOMINAL/GYNECOLOGIC SURGERIES:    Past Surgical History:   Procedure Laterality Date     ORTHOPEDIC SURGERY  1997    ORIF left wrist         PERTINENT MEDICATIONS:  Current Outpatient Medications   Medication Sig Dispense Refill     Cholecalciferol (VITAMIN D PO) Take 1,000 Units by mouth daily        norgestimate-ethinyl estradiol (ORTHO-CYCLEN) 0.25-35 MG-MCG tablet Take 1 tablet by mouth daily 28 tablet 3       No other OTC/herbal/supplements reported by patient.    SOCIAL HISTORY:  Social History     Socioeconomic History     Marital " status:      Spouse name: Wesly     Number of children: 1     Years of education: Not on file     Highest education level: Not on file   Occupational History     Not on file   Tobacco Use     Smoking status: Former     Types: Cigarettes     Quit date: 2010     Years since quittin.1     Smokeless tobacco: Never   Vaping Use     Vaping Use: Never used   Substance and Sexual Activity     Alcohol use: Yes     Comment: occ     Drug use: No     Sexual activity: Not Currently     Partners: Male   Other Topics Concern     Parent/sibling w/ CABG, MI or angioplasty before 65F 55M? No     Comment: Adopted   Social History Narrative     Not on file     Social Determinants of Health     Financial Resource Strain: Not on file   Food Insecurity: Not on file   Transportation Needs: Not on file   Physical Activity: Not on file   Stress: Not on file   Social Connections: Not on file   Intimate Partner Violence: Not on file   Housing Stability: Not on file       FAMILY HISTORY:  Denies colon/panc/esophageal/other GI CA, no other Rodriguez or other HPS-related Juvencio. No IBD/celiac, no other AI/liver/thyroid disease.    Family History   Adopted: Yes   Problem Relation Age of Onset     Alcohol/Drug Mother         A&D     Alcohol/Drug Father         A&D     Parkinsonism Paternal Grandmother      Cerebrovascular Disease Maternal Grandmother         x3     Other - See Comments Sister         fetal alcohol syndrome     Cancer Maternal Grandfather         lung     Other Cancer Paternal Grandfather         Lung       PHYSICAL EXAMINATION:  Vitals reviewed  LMP 2023 (Exact Date)     General: Patient appears well in no acute distress.   Skin: No visualized rash or lesions on visualized skin  Eyes: EOMI, no erythema, sclera icterus or discharge noted  Resp: breathing comfortably without accessory muscle usage, speaking in full sentences, no cough  Lung sounds clear  Card: Regular and rhythmic S1 and S2. No murmur,gallop, or  rub  Abdomen: Hypoactive bowel sounds X 4 quadrants.Reported diffuse abdominal tenderness on palpation, worse in periumbilical area and RUQ.  Possible umbilical hernia? Soft to palpation, no guarding or rebound tenderness.   MSK: Appears to have normal range of motion based on visualized movements  Neurologic: No apparent tremors, facial movements symmetric  Psych: affect normal, alert and oriented      PERTINENT STUDIES Reviewed in EMR    ASSESSMENT/PLAN:  38 year old female  presented  to GI clinic for evaluation of ongoing abdominal pain for many year, but started getting more intense and frequent in the past year. Pain if diffused, worse in upper abdomen and harvey-umbilical area. Complains of altered stool pattern ranging from diarrhea to constipation. No history of abdominal surgeries. Not on chronic NSAIDs..  We discussed differential diagnosis. Explained to the patient that her symptoms are likely related to IBS, mixed type. Suspect a small periumbilical area. Offer further evaluation of other possible etiologies.  Will proceed with upper GI endoscopy and CT of abdomen.  Recommended to start a low dose of fiber and gradually taper the dose up as tolerates.      ICD-10-CM    1. Nausea  R11.0 Adult GI  Referral - Procedure Only      2. Abdominal pain, epigastric  R10.13 Adult GI  Referral - Consult Only     Adult GI  Referral - Procedure Only     CT Abdomen w/o & w Contrast      3. Alternating constipation and diarrhea  R19.8 Adult GI  Referral - Procedure Only     CT Abdomen w/o & w Contrast        Patient verbalized understanding and appreciation of care provided. Stated that all of the questions were answered to her/his satisfaction.    Additional 15 minutes on the date of service was spent performing the following:   -Preparing to see the patient (eg, review of tests,providers progress notes, medical/surgical history,etc)   -Ordering medications, tests, or procedures    -Documenting clinical information in the electronic or other health record     RTC in 3 months    Thank you for this consultation. It was a pleasure to participate in the care of this patient; please contact us with any further questions.    JUNE Cloud, FAB  [facility]  Gastroenterology Department  [place], [place]    This note was created with Dragon voice recognition software, and while reviewed for accuracy, inadvertent minor typographic errors may occur. Please contact the provider if you have any questions.

## 2023-03-16 ENCOUNTER — HOSPITAL ENCOUNTER (EMERGENCY)
Facility: CLINIC | Age: 38
Discharge: HOME | End: 2023-03-16
Attending: PHYSICIAN ASSISTANT | Admitting: PHYSICIAN ASSISTANT
Payer: COMMERCIAL

## 2023-03-16 ENCOUNTER — TELEPHONE (OUTPATIENT)
Dept: GASTROENTEROLOGY | Facility: CLINIC | Age: 38
End: 2023-03-16
Payer: COMMERCIAL

## 2023-03-16 ENCOUNTER — HOSPITAL ENCOUNTER (OUTPATIENT)
Dept: CT IMAGING | Facility: CLINIC | Age: 38
Discharge: HOME | End: 2023-03-16
Attending: NURSE PRACTITIONER | Admitting: NURSE PRACTITIONER
Payer: COMMERCIAL

## 2023-03-16 VITALS
DIASTOLIC BLOOD PRESSURE: 91 MMHG | RESPIRATION RATE: 18 BRPM | OXYGEN SATURATION: 97 % | SYSTOLIC BLOOD PRESSURE: 130 MMHG | HEART RATE: 85 BPM | TEMPERATURE: 98 F | BODY MASS INDEX: 25.42 KG/M2 | WEIGHT: 139 LBS

## 2023-03-16 DIAGNOSIS — I26.99 PULMONARY EMBOLISM (H): ICD-10-CM

## 2023-03-16 DIAGNOSIS — R19.8 ALTERNATING CONSTIPATION AND DIARRHEA: ICD-10-CM

## 2023-03-16 DIAGNOSIS — R10.13 ABDOMINAL PAIN, EPIGASTRIC: ICD-10-CM

## 2023-03-16 LAB
ANION GAP SERPL CALCULATED.3IONS-SCNC: 10 MMOL/L (ref 7–15)
BASOPHILS # BLD AUTO: 0.1 10E3/UL (ref 0–0.2)
BASOPHILS NFR BLD AUTO: 1 %
BUN SERPL-MCNC: 10.2 MG/DL (ref 6–20)
CALCIUM SERPL-MCNC: 8.8 MG/DL (ref 8.6–10)
CHLORIDE SERPL-SCNC: 104 MMOL/L (ref 98–107)
CREAT SERPL-MCNC: 0.76 MG/DL (ref 0.51–0.95)
D DIMER PPP FEU-MCNC: 2.49 UG/ML FEU (ref 0–0.5)
DEPRECATED HCO3 PLAS-SCNC: 24 MMOL/L (ref 22–29)
EOSINOPHIL # BLD AUTO: 0.1 10E3/UL (ref 0–0.7)
EOSINOPHIL NFR BLD AUTO: 2 %
ERYTHROCYTE [DISTWIDTH] IN BLOOD BY AUTOMATED COUNT: 12.7 % (ref 10–15)
GFR SERPL CREATININE-BSD FRML MDRD: >90 ML/MIN/1.73M2
GLUCOSE SERPL-MCNC: 94 MG/DL (ref 70–99)
HCT VFR BLD AUTO: 37.4 % (ref 35–47)
HGB BLD-MCNC: 12.2 G/DL (ref 11.7–15.7)
IMM GRANULOCYTES # BLD: 0 10E3/UL
IMM GRANULOCYTES NFR BLD: 0 %
LYMPHOCYTES # BLD AUTO: 1.1 10E3/UL (ref 0.8–5.3)
LYMPHOCYTES NFR BLD AUTO: 18 %
MCH RBC QN AUTO: 28.2 PG (ref 26.5–33)
MCHC RBC AUTO-ENTMCNC: 32.6 G/DL (ref 31.5–36.5)
MCV RBC AUTO: 86 FL (ref 78–100)
MONOCYTES # BLD AUTO: 0.3 10E3/UL (ref 0–1.3)
MONOCYTES NFR BLD AUTO: 6 %
NEUTROPHILS # BLD AUTO: 4.2 10E3/UL (ref 1.6–8.3)
NEUTROPHILS NFR BLD AUTO: 73 %
NRBC # BLD AUTO: 0 10E3/UL
NRBC BLD AUTO-RTO: 0 /100
PLATELET # BLD AUTO: 156 10E3/UL (ref 150–450)
POTASSIUM SERPL-SCNC: 4.1 MMOL/L (ref 3.4–5.3)
RADIOLOGIST FLAGS: ABNORMAL
RBC # BLD AUTO: 4.33 10E6/UL (ref 3.8–5.2)
SODIUM SERPL-SCNC: 138 MMOL/L (ref 136–145)
TROPONIN T SERPL HS-MCNC: <6 NG/L
WBC # BLD AUTO: 5.8 10E3/UL (ref 4–11)

## 2023-03-16 PROCEDURE — 84484 ASSAY OF TROPONIN QUANT: CPT | Performed by: PHYSICIAN ASSISTANT

## 2023-03-16 PROCEDURE — 85379 FIBRIN DEGRADATION QUANT: CPT | Performed by: PHYSICIAN ASSISTANT

## 2023-03-16 PROCEDURE — 250N000011 HC RX IP 250 OP 636: Performed by: NURSE PRACTITIONER

## 2023-03-16 PROCEDURE — 99284 EMERGENCY DEPT VISIT MOD MDM: CPT | Performed by: PHYSICIAN ASSISTANT

## 2023-03-16 PROCEDURE — 93010 ELECTROCARDIOGRAM REPORT: CPT | Performed by: PHYSICIAN ASSISTANT

## 2023-03-16 PROCEDURE — 82310 ASSAY OF CALCIUM: CPT | Performed by: PHYSICIAN ASSISTANT

## 2023-03-16 PROCEDURE — 85025 COMPLETE CBC W/AUTO DIFF WBC: CPT | Performed by: PHYSICIAN ASSISTANT

## 2023-03-16 PROCEDURE — 93005 ELECTROCARDIOGRAM TRACING: CPT | Performed by: PHYSICIAN ASSISTANT

## 2023-03-16 PROCEDURE — 99284 EMERGENCY DEPT VISIT MOD MDM: CPT | Mod: 25 | Performed by: PHYSICIAN ASSISTANT

## 2023-03-16 PROCEDURE — 74177 CT ABD & PELVIS W/CONTRAST: CPT

## 2023-03-16 PROCEDURE — 250N000009 HC RX 250: Performed by: NURSE PRACTITIONER

## 2023-03-16 PROCEDURE — 36415 COLL VENOUS BLD VENIPUNCTURE: CPT | Performed by: PHYSICIAN ASSISTANT

## 2023-03-16 RX ORDER — IOPAMIDOL 755 MG/ML
500 INJECTION, SOLUTION INTRAVASCULAR ONCE
Status: COMPLETED | OUTPATIENT
Start: 2023-03-16 | End: 2023-03-16

## 2023-03-16 RX ADMIN — SODIUM CHLORIDE 60 ML: 9 INJECTION, SOLUTION INTRAVENOUS at 13:13

## 2023-03-16 RX ADMIN — IOPAMIDOL 65 ML: 755 INJECTION, SOLUTION INTRAVENOUS at 13:14

## 2023-03-16 ASSESSMENT — ACTIVITIES OF DAILY LIVING (ADL): ADLS_ACUITY_SCORE: 35

## 2023-03-16 NOTE — DISCHARGE INSTRUCTIONS
Please discontinue your birth control and start taking the Xarelto as prescribed.  This is a blood thinner which will increase your risk of bleeding, but will help treat the blood clot found in your lungs today.      Please follow-up with your clinic provider next week for reassessment.  If you do have any worsening symptoms like we talked about please return to the emergency department.    Thank you for choosing [facility]'s Emergency Department. It was a pleasure taking care of you today. If you have any questions, please call [phone].    Flower Acevedo PA-C

## 2023-03-16 NOTE — ED TRIAGE NOTES
Pt presents to ER after having CT scan this afternoon. Pt was called with results of PE found on the scan and sent to ER for treatment

## 2023-03-16 NOTE — TELEPHONE ENCOUNTER
Dr. Starr with radiology calling with critical finding on CT scan.      There is an unexpected findin.  Appearance of partially visualized right lower lobe pulmonaryembolism. Please consider PE study for full evaluation.     2.  Small fat-containing umbilical hernia with mild inflammation. No herniated or obstructed bowel.    Call placed to Adalgisa Cohen NP, she did review CT report and would like patient to be seen in the ED.  Able to speak with patient and she expressed understanding of need for eval in ED per recommendation from Adalgisa.      Marzena Castle RN

## 2023-03-16 NOTE — ED PROVIDER NOTES
History     Chief Complaint   Patient presents with     PE       HPI   Yvette Weiss is a 38 year old female who presents to the emergency department for a pulmonary embolus.  The patient had a CT scan completed today for her ongoing GI complaints and there was an incidental right lower lung pulmonary embolus found.  Because of this, she was referred to the ED.  The patient denies any shortness of breath, pleuritic chest pain.  She denies any lower leg swelling or pain.  She has had central chest discomfort radiating into her abdomen for which she is seeing GI.  No reported fevers or cough.  She just started oral estrogen contraceptives 3 weeks ago for dyspareunia.  She is a non-smoker.        Allergies:  Allergies   Allergen Reactions     Latex Hives       Problem List:    Patient Active Problem List    Diagnosis Date Noted     Vulvar cyst 04/02/2020     Priority: Medium     CARDIOVASCULAR SCREENING; LDL GOAL LESS THAN 160 02/09/2012     Priority: Medium     24 hour clinic contact list given 02/09/2012     Priority: Medium     EMERGENCY CARE PLAN  Presenting Problem Signs and Symptoms Treatment Plan    Questions or conerns during clinic hours    I will call the clinic directly     Questions or conerns outside clinic hours    I will call the 24 hour nurse line at [phone]    Patient needs to schedule an appointment    I will call the 24 hour scheduling team at [phone] or clinic directly    Same day treatment     I will call the clinic first, nurse line if after hours, urgent care and express care if needed                                  Past Medical History:    Past Medical History:   Diagnosis Date     Anxiety      ASCUS on Pap smear 2011     Chickenpox      Depression      Gestational diabetes 2016     Lateral epicondylitis 9/14/2004     Postpartum depression 4/2014       Past Surgical History:    Past Surgical History:   Procedure Laterality Date     ORTHOPEDIC SURGERY  1997    ORIF left wrist        Family History:    Family History   Adopted: Yes   Problem Relation Age of Onset     Alcohol/Drug Mother         A&D     Alcohol/Drug Father         A&D     Parkinsonism Paternal Grandmother      Cerebrovascular Disease Maternal Grandmother         x3     Other - See Comments Sister         fetal alcohol syndrome     Cancer Maternal Grandfather         lung     Other Cancer Paternal Grandfather         Lung       Social History:  Marital Status:   [2]  Social History     Tobacco Use     Smoking status: Former     Types: Cigarettes     Quit date: 2010     Years since quittin.1     Smokeless tobacco: Never   Vaping Use     Vaping Use: Never used   Substance Use Topics     Alcohol use: Yes     Comment: occ     Drug use: No        Medications:    Rivaroxaban ANTICOAGULANT 15 & 20 MG TBPK Starter Therapy Pack  Cholecalciferol (VITAMIN D PO)          Review of Systems   All other systems reviewed and are negative.         Physical Exam   BP: (!) 130/91  Pulse: 85  Temp: 98  F (36.7  C)  Resp: 18  Weight: 63 kg (139 lb)  SpO2: 97 %      Physical Exam  Vitals and nursing note reviewed.   Constitutional:       General: She is not in acute distress.     Appearance: Normal appearance. She is well-developed. She is not ill-appearing, toxic-appearing or diaphoretic.   HENT:      Head: Normocephalic and atraumatic.   Eyes:      Conjunctiva/sclera: Conjunctivae normal.      Pupils: Pupils are equal, round, and reactive to light.   Cardiovascular:      Rate and Rhythm: Normal rate and regular rhythm.      Heart sounds: Normal heart sounds.   Pulmonary:      Effort: Pulmonary effort is normal. No respiratory distress.      Breath sounds: Normal breath sounds.   Abdominal:      General: Bowel sounds are normal. There is no distension.      Palpations: Abdomen is soft.      Tenderness: There is abdominal tenderness (mild generalized).   Musculoskeletal:         General: No deformity.      Cervical back: Neck  supple.      Right lower leg: No edema.      Left lower leg: No edema.   Skin:     General: Skin is warm and dry.   Neurological:      General: No focal deficit present.      Mental Status: She is alert and oriented to person, place, and time. Mental status is at baseline.      Coordination: Coordination normal.   Psychiatric:         Mood and Affect: Mood normal.         Behavior: Behavior normal.         Thought Content: Thought content normal.            ED Course           Procedures           EKG Interpretation:      Interpreted by Flower Acevedo PA-C  Time reviewed: 1610  Symptoms at time of EKG: pulmonary embolus   Rhythm: normal sinus   Rate: normal  Axis: normal  Ectopy: none  Conduction: normal  ST Segments/ T Waves: No ST-T wave changes  Q Waves: none  Comparison to prior: No old EKG available    Clinical Impression: normal EKG        Results for orders placed or performed during the hospital encounter of 03/16/23 (from the past 24 hour(s))   CBC with platelets differential    Narrative    The following orders were created for panel order CBC with platelets differential.  Procedure                               Abnormality         Status                     ---------                               -----------         ------                     CBC with platelets and d...[[id number]]                      Final result                 Please view results for these tests on the individual orders.   Basic metabolic panel   Result Value Ref Range    Sodium 138 136 - 145 mmol/L    Potassium 4.1 3.4 - 5.3 mmol/L    Chloride 104 98 - 107 mmol/L    Carbon Dioxide (CO2) 24 22 - 29 mmol/L    Anion Gap 10 7 - 15 mmol/L    Urea Nitrogen 10.2 6.0 - 20.0 mg/dL    Creatinine 0.76 0.51 - 0.95 mg/dL    Calcium 8.8 8.6 - 10.0 mg/dL    Glucose 94 70 - 99 mg/dL    GFR Estimate >90 >60 mL/min/1.73m2   Troponin T, High Sensitivity   Result Value Ref Range    Troponin T, High Sensitivity <6 <=14 ng/L   D dimer quantitative    Result Value Ref Range    D-Dimer Quantitative 2.49 (H) 0.00 - 0.50 ug/mL FEU    Narrative    This D-dimer assay is intended for use in conjunction with a clinical pretest probability assessment model to exclude pulmonary embolism (PE) and deep venous thrombosis (DVT) in outpatients suspected of PE or DVT. The cut-off value is 0.50 ug/mL FEU.   CBC with platelets and differential   Result Value Ref Range    WBC Count 5.8 4.0 - 11.0 10e3/uL    RBC Count 4.33 3.80 - 5.20 10e6/uL    Hemoglobin 12.2 11.7 - 15.7 g/dL    Hematocrit 37.4 35.0 - 47.0 %    MCV 86 78 - 100 fL    MCH 28.2 26.5 - 33.0 pg    MCHC 32.6 31.5 - 36.5 g/dL    RDW 12.7 10.0 - 15.0 %    Platelet Count 156 150 - 450 10e3/uL    % Neutrophils 73 %    % Lymphocytes 18 %    % Monocytes 6 %    % Eosinophils 2 %    % Basophils 1 %    % Immature Granulocytes 0 %    NRBCs per 100 WBC 0 <1 /100    Absolute Neutrophils 4.2 1.6 - 8.3 10e3/uL    Absolute Lymphocytes 1.1 0.8 - 5.3 10e3/uL    Absolute Monocytes 0.3 0.0 - 1.3 10e3/uL    Absolute Eosinophils 0.1 0.0 - 0.7 10e3/uL    Absolute Basophils 0.1 0.0 - 0.2 10e3/uL    Absolute Immature Granulocytes 0.0 <=0.4 10e3/uL    Absolute NRBCs 0.0 10e3/uL       Medications - No data to display       Assessments & Plan (with Medical Decision Making)  Yvette Weiss is a 38 year old female who presented to the ED due to an abnormal CT scan showing a pulmonary embolus.  CT had been done of the abdomen/pelvis for GI issues.  She denies any chest pain or shortness of breath or lightheadedness.  Other than her chronic GI concerns she has been feeling her baseline.  CT scan reviewed and did show an appearance of a partially visualized right lower lobe pulmonary embolus.  On arrival to the ED she was mildly hypertensive but otherwise had normal vital signs, heart rate was in the 80s, O2 saturations 97% on room air.  Exam today completely benign.  It appears patient is asymptomatic for this PE.  Because of this, lower concern  for any cardiac strain however we did check an EKG and a troponin today and these were unremarkable.  Her D-dimer was quite elevated today at 2.49 correlating with CT results.  She just started estrogen pills 3 weeks ago for pelvic pain and I think this is probably a contributing factor to this new PE.  I discussed management options with the patient and after going over risks and benefits of NOACs versus Coumadin, she preferred to go with the newer anticoagulant.  Xarelto will be prescribed.  She was advised to stop her OCPs and to follow-up with her clinic provider next week for reassessment.  We did not do a CT PE study today because she just had contrast from the CT abdomen/pelvis and I did not want to cause a renal injury, and it would not change immediate management for this PE since patient is completely asymptomatic with normal vital signs.  She may benefit from further imaging in the near future to rule out any other potential contributing factors to this pulmonary embolus.  Will defer this to her clinic provider.  I did go over warning signs and symptoms of when the patient should return to the ED.  All questions answered and patient discharged home in suitable condition.     I have reviewed the nursing notes.    I have reviewed the findings, diagnosis, plan and need for follow up with the patient.      New Prescriptions    RIVAROXABAN ANTICOAGULANT 15 & 20 MG TBPK STARTER THERAPY PACK    Take 15 mg by mouth 2 times daily (with meals) for 21 days, THEN 20 mg daily with food for 9 days.       Final diagnoses:   Pulmonary embolism (H)     Note: Chart documentation done in part with Dragon Voice Recognition software. Although reviewed after completion, some word and grammatical errors may remain.     3/16/2023   [facility] EMERGENCY DEPT     Flower Acevedo PA-C  03/16/23 [id number]

## 2023-03-20 ENCOUNTER — TELEPHONE (OUTPATIENT)
Dept: GASTROENTEROLOGY | Facility: CLINIC | Age: 38
End: 2023-03-20
Payer: COMMERCIAL

## 2023-03-20 NOTE — TELEPHONE ENCOUNTER
Screening Questions  BLUE  KIND OF PREP RED  LOCATION [review exclusion criteria] GREEN  SEDATION TYPE        Y Are you active on mychart?       NOE VORA Ordering/Referring Provider?        BCBS What type of coverage do you have?      N Have you had a positive covid test in the last 14 days?     25.9 1. BMI  [BMI 40+ - review exclusion criteria]    Y  2. Are you able to give consent for your medical care? [IF NO,RN REVIEW]          N  3. Are you taking any prescription pain medications on a routine schedule   (ex narcotics: oxycodone, roxicodone, oxycontin,  and percocet)? [RN Review]          3a. EXTENDED PREP What kind of prescription?     N 4. Do you have any chemical dependencies such as alcohol, street drugs, or methadone?        **If yes 3- 5 , please schedule with MAC sedation.**          IF YES TO ANY 6 - 10 - HOSPITAL SETTING ONLY.     N 6.   Do you need assistance transferring?     N 7.   Have you had a heart or lung transplant?    N 8.   Are you currently on dialysis?   N 9.   Do you use daily home oxygen?   N 10. Do you take nitroglycerin?   10a.  If yes, how often?     11. [FEMALES]  N Are you currently pregnant?    11a.  If yes, how many weeks? [ Greater than 12 weeks, OR NEEDED]    N 12. Do you have Pulmonary Hypertension? *NEED PAC APPT AT [facility] w/ MAC*     N 13. [review exclusion criteria]  Do you have any implantable devices in your body (pacemaker, defib, LVAD)?    N 14. In the past 6 months, have you had any heart related issues including cardiomyopathy or heart attack?     14a.  If yes, did it require cardiac stenting if so when?     N 15. Have you had a stroke or Transient ischemic attack (TIA - aka  mini stroke ) within 6 months?      N 16. Do you have mod to severe Obstructive Sleep Apnea?  [Hospital only]    N 17. Do you have SEVERE AND UNCONTROLLED asthma? *NEED PAC APPT AT [facility] w/MAC*     18. Are you currently taking any blood thinners?     18a. Yes, Are you taking  "Effient/Prasugrel?   18b. Yes/no Blood Thinner: No. Inform patient to \"follow up w/ ordering provider for bridging instructions.\"    N 19. Do you take the medication Phentermine?    19a. If yes, \"Hold for 7 days before procedure.  Please consult your prescribing provider if you have questions about holding this medication.\"     N  20. Do you have chronic kidney disease?      N  21. Do you have a diagnosis of diabetes?       22. On a regular basis do you go 3-5 days between bowel movements?      23. Preferred LOCAL Pharmacy for Pre Prescription    [ LIST ONLY ONE PHARMACY]       [facility] [id number] [place] [address]        - [facility] REMINDERS -    Informed patient they will need an adult    Cannot take any type of public or medical transportation alone    Conscious Sedation- Needs  for 6 hours after the procedure       MAC/General-Needs  for 24 hours after procedure    Pre-Procedure Covid test to be completed [[facility] PCR Testing Required]    Confirmed Nurse will call to complete assessment       - SCHEDULING DETAILS -  N Hospital Setting Required? If yes, what is the exclusion?:    NABIL  Surgeon    5/2  Date of Procedure  Upper Endoscopy [EGD]  Type of Procedure Scheduled  [facility] Location   Which Colonoscopy Prep was Sent?     MAC Sedation Type     N PAC / Pre-op Required                 "

## 2023-03-21 ENCOUNTER — MYC MEDICAL ADVICE (OUTPATIENT)
Dept: INTERNAL MEDICINE | Facility: CLINIC | Age: 38
End: 2023-03-21
Payer: COMMERCIAL

## 2023-03-21 NOTE — TELEPHONE ENCOUNTER
Spoke with patient and she was unsure if she could take anything for her headache due to being on a Rivaroxaban. Patient instructed not to take ibuprofen or aspirin and instead to take Tylenol and to try an ice pack to see if that alleviates the headache. She will also push fluids and try some caffeine as well. She will call back with any further concerns.    Es Soto,MADDIN, RN

## 2023-03-24 ENCOUNTER — OFFICE VISIT (OUTPATIENT)
Dept: INTERNAL MEDICINE | Facility: CLINIC | Age: 38
End: 2023-03-24
Payer: COMMERCIAL

## 2023-03-24 VITALS
HEART RATE: 80 BPM | SYSTOLIC BLOOD PRESSURE: 128 MMHG | DIASTOLIC BLOOD PRESSURE: 70 MMHG | RESPIRATION RATE: 16 BRPM | BODY MASS INDEX: 25.14 KG/M2 | OXYGEN SATURATION: 100 % | WEIGHT: 136.6 LBS | TEMPERATURE: 97.7 F | HEIGHT: 62 IN

## 2023-03-24 DIAGNOSIS — I27.82 CHRONIC PULMONARY EMBOLISM WITHOUT ACUTE COR PULMONALE, UNSPECIFIED PULMONARY EMBOLISM TYPE (H): Primary | ICD-10-CM

## 2023-03-24 PROCEDURE — 99213 OFFICE O/P EST LOW 20 MIN: CPT | Performed by: INTERNAL MEDICINE

## 2023-03-24 ASSESSMENT — PAIN SCALES - GENERAL: PAINLEVEL: MILD PAIN (3)

## 2023-03-24 NOTE — PROGRESS NOTES
"      Brittani Crawford is a 38 year old, presenting for the following health issues:  ER F/U  No flowsheet data found.  History of Present Illness       Reason for visit:  Had a ct scan they found a pulmonary embolism in my lower right lung, went to er, now I need a follow up appointment    She eats 0-1 servings of fruits and vegetables daily.She consumes 1 sweetened beverage(s) daily.She exercises with enough effort to increase her heart rate 10 to 19 minutes per day.  She exercises with enough effort to increase her heart rate 3 or less days per week.   She is taking medications regularly.        EMR reviewed including:             Complaint, History of Chief Complaint, Corresponding Review of Systems, and Complaint Specific Physical Examination.    #1   Serendipitously found small pulmonary embolism during abdominal CAT scan.  Already started on Xarelto  Patient was taking oral birth control and marijuana.  (Specifically notes that she does not smoke cigarettes)  .  No longer taking oral birth control.  No prior family history of spontaneous embolic events.        Exam:   LUNGS: clear bilaterally, airflow is brisk, no intercostal retraction or stridor is noted. No coughing is noted during visit.   Constitutional: The patient appears to be in no acute distress. The patient appears to be adequately hydrated. No acute respiratory or hemodynamic distress is noted at this time.      #2   Intermittent abdominal pain.  Work-up underway per gastroenterology.  Upper and lower endoscopy pending in May.        Patient has been interviewed, applicable history and applied review of systems have been performed.    Vital Signs:   /70 (BP Location: Right arm, Patient Position: Chair)   Pulse 80   Temp 97.7  F (36.5  C) (Temporal)   Resp 16   Ht 1.577 m (5' 2.1\")   Wt 62 kg (136 lb 9.6 oz)   LMP 03/20/2023   SpO2 100%   BMI 24.90 kg/m        Decision Making    Problem and Complexity     1. Chronic pulmonary " embolism without acute cor pulmonale, unspecified pulmonary embolism type (H)  Continue Xarelto for 12 to 16 weeks                          FOLLOW UP   I have asked the patient to make an appointment for followup with either:  1.  Me,  2.  The patient's preferred provider,  3.  Any available provider  In 2 months.  Follow-up with gastroenterology as scheduled        Regarding routine vaccinations:  I have reviewed the patient's vaccination schedule and discussed the benefits of prophylactic vaccination in detail.  I recommend the patient contact their pharmacist (not the pharmacy within the clinic) for vaccinations.  Discussed that most insurance companies now favor reimbursement to the pharmacies and it will financially behoove the patient to have vaccinations performed at their pharmacy.        I have carefully explained the diagnosis and treatment options to the patient.  The patient has displayed an understanding of the above, and all subsequent questions were answered.      DO GITA Sandoval    Portions of this note were produced using [employer]  Although every attempt at real-time proof reading has been made, occasional grammar/syntax errors may have been missed.

## 2023-04-13 DIAGNOSIS — I27.82 CHRONIC PULMONARY EMBOLISM WITHOUT ACUTE COR PULMONALE, UNSPECIFIED PULMONARY EMBOLISM TYPE (H): Primary | ICD-10-CM

## 2023-04-14 NOTE — TELEPHONE ENCOUNTER
Routing refill request to provider for review/approval because:    Requested Prescriptions   Pending Prescriptions Disp Refills    XARELTO STARTER PACK ANTICOAGULANT 15 & 20 MG TBPK Starter Therapy Pack [Pharmacy Med Name: XARELTO DVT-PE TREAT START 30D]       Sig: TAKE 15 MG BY MOUTH 2 TIMES DAILY (WITH MEALS) FOR 21 DAYS, THEN 20 MG DAILY WITH FOOD FOR 9 DAYS.       Direct Oral Anticoagulant Agents Failed - 4/13/2023  1:34 PM        Failed - Creatinine Clearance greater than 50 ml/min on file in past 3 mos     No lab results found.

## 2023-05-01 ENCOUNTER — ANESTHESIA EVENT (OUTPATIENT)
Dept: GASTROENTEROLOGY | Facility: CLINIC | Age: 38
End: 2023-05-01
Payer: COMMERCIAL

## 2023-05-01 ASSESSMENT — LIFESTYLE VARIABLES: TOBACCO_USE: 1

## 2023-05-01 NOTE — ANESTHESIA PREPROCEDURE EVALUATION
Anesthesia Pre-Procedure Evaluation    Patient: Yvette Weiss   MRN: [id number] : 1985        Procedure : Procedure(s):  Esophagoscopy, gastroscopy, duodenoscopy (EGD), combined          Past Medical History:   Diagnosis Date     Anxiety     as teenager     ASCUS on Pap smear     neg for High Risk HPV     Chickenpox     x2     Depression     as teenager     Gestational diabetes 2016     Lateral epicondylitis 2004     Postpartum depression 2014    mild      Past Surgical History:   Procedure Laterality Date     ORTHOPEDIC SURGERY      ORIF left wrist      Allergies   Allergen Reactions     Latex Hives      Social History     Tobacco Use     Smoking status: Former     Types: Cigarettes     Quit date: 2010     Years since quittin.2     Smokeless tobacco: Never   Vaping Use     Vaping status: Never Used   Substance Use Topics     Alcohol use: Yes     Comment: occ      Wt Readings from Last 1 Encounters:   23 62 kg (136 lb 9.6 oz)        Anesthesia Evaluation   Pt has had prior anesthetic. Type: Regional and General.    No history of anesthetic complications       ROS/MED HX  ENT/Pulmonary: Comment: Quit Smokin/20/10 - neg pulmonary ROS   (+) tobacco use, Past use,     Neurologic:  - neg neurologic ROS     Cardiovascular:  - neg cardiovascular ROS     METS/Exercise Tolerance: >4 METS    Hematologic:  - neg hematologic  ROS     Musculoskeletal:  - neg musculoskeletal ROS     GI/Hepatic:  - neg GI/hepatic ROS     Renal/Genitourinary:  - neg Renal ROS     Endo:  - neg endo ROS     Psychiatric/Substance Use:  - neg psychiatric ROS   (+) psychiatric history anxiety and depression     Infectious Disease:  - neg infectious disease ROS     Malignancy:  - neg malignancy ROS     Other:  - neg other ROS          Physical Exam    Airway  airway exam normal      Mallampati: II   TM distance: > 3 FB   Neck ROM: full   Mouth opening: > 3 cm    Respiratory Devices and Support         Dental            Cardiovascular   cardiovascular exam normal       Rhythm and rate: regular and normal     Pulmonary   pulmonary exam normal        breath sounds clear to auscultation           OUTSIDE LABS:  CBC:   Lab Results   Component Value Date    WBC 5.8 03/16/2023    WBC 5.5 12/08/2022    HGB 12.2 03/16/2023    HGB 13.2 12/08/2022    HCT 37.4 03/16/2023    HCT 40.3 12/08/2022     03/16/2023     12/08/2022     BMP:   Lab Results   Component Value Date     03/16/2023     12/08/2022    POTASSIUM 4.1 03/16/2023    POTASSIUM 4.1 12/08/2022    CHLORIDE 104 03/16/2023    CHLORIDE 102 12/08/2022    CO2 24 03/16/2023    CO2 30 (H) 12/08/2022    BUN 10.2 03/16/2023    BUN 15.9 12/08/2022    CR 0.76 03/16/2023    CR 0.78 12/08/2022    GLC 94 03/16/2023     (H) 12/08/2022     COAGS: No results found for: PTT, INR, FIBR  POC:   Lab Results   Component Value Date     (H) 01/19/2016    HCG Negative 02/09/2012     HEPATIC:   Lab Results   Component Value Date    ALBUMIN 4.7 12/08/2022    PROTTOTAL 7.7 12/08/2022    ALT 19 12/08/2022    AST 23 12/08/2022    ALKPHOS 68 12/08/2022    BILITOTAL 0.3 12/08/2022     OTHER:   Lab Results   Component Value Date    JACOB 8.8 03/16/2023    LIPASE 34 12/08/2022    TSH 1.44 08/01/2011    T4 1.44 08/01/2011       Anesthesia Plan    ASA Status:  1   NPO Status:  NPO Appropriate    Anesthesia Type: MAC.     - Reason for MAC: straight local not clinically adequate   Induction: Intravenous, Propofol.   Maintenance: TIVA.        Consents    Anesthesia Plan(s) and associated risks, benefits, and realistic alternatives discussed. Questions answered and patient/representative(s) expressed understanding.    - Discussed:     - Discussed with:  Patient      - Extended Intubation/Ventilatory Support Discussed: No.      - Patient is DNR/DNI Status: No    Use of blood products discussed: No .     Postoperative Care    Pain management: IV analgesics.   PONV prophylaxis:  Ondansetron (or other 5HT-3), Dexamethasone or Solumedrol     Comments:    Other Comments: The risks and benefits of anesthesia, and the alternatives where applicable, have been discussed with the patient, and they wish to proceed.            JUNE Ely CRNA

## 2023-05-01 NOTE — H&P
[facility] History and Physical    Yvette Weiss MRN# [id number]   Age: 38 year old YOB: 1985     Date of Admission:  (Not on file)    Home clinic: [facility]  Primary care provider: No Ref-Primary, Physician          Impression and Plan:   Impression:   Nausea [R11.0]  Epigastric pain  No prior EGD      Plan:   Proceed to EGD as planned.  The procedure, risks(bleeding, perforation), benefits and alternatives were discussed and the patient agrees to proceed. Cleared for Anesthesia             Chief Complaint:   Nausea [R11.0]    History is obtained from the patient          History of Present Illness:   This 38 year old female is being seen at this time for evaluation for EGD. Pt with chest and epigastric.  Associated nausea.  No fatty food intolerance.  Pain radiating to back mainly on the right side.           Past Medical History:     Past Medical History:   Diagnosis Date     Anxiety     as teenager     ASCUS on Pap smear     neg for High Risk HPV     Chickenpox     x2     Depression     as teenager     Gestational diabetes 2016     Lateral epicondylitis 2004     Postpartum depression 2014    mild            Past Surgical History:     Past Surgical History:   Procedure Laterality Date     ORTHOPEDIC SURGERY      ORIF left wrist            Social History:     Social History     Tobacco Use     Smoking status: Former     Types: Cigarettes     Quit date: 2010     Years since quittin.2     Smokeless tobacco: Never   Vaping Use     Vaping status: Never Used   Substance Use Topics     Alcohol use: Yes     Comment: occ            Family History:     Family History   Adopted: Yes   Problem Relation Age of Onset     Alcohol/Drug Mother         A&D     Alcohol/Drug Father         A&D     Parkinsonism Paternal Grandmother      Cerebrovascular Disease Maternal Grandmother         x3     Other - See Comments Sister         fetal alcohol syndrome      Cancer Maternal Grandfather         lung     Other Cancer Paternal Grandfather         Lung            Immunizations:     VACCINE/DOSE   Diptheria   DPT   DTAP   HBIG   Hepatitis A   Hepatitis B   HIB   Influenza   Measles   Meningococcal   MMR   Mumps   Pneumococcal   Polio   Rubella   Small Pox   TDAP   Varicella   Zoster            Allergies:     Allergies   Allergen Reactions     Latex Hives            Medications:     No current facility-administered medications for this encounter.     Current Outpatient Medications   Medication Sig     Cholecalciferol (VITAMIN D PO) Take 1,000 Units by mouth daily      rivaroxaban ANTICOAGULANT (XARELTO) 20 MG TABS tablet Take 1 tablet (20 mg) by mouth daily (with dinner)             Review of Systems:   The review of systems was positive for the following findings.  None.  The remainder of the review of systems was unremarkable.          Physical Exam:   All vitals have been reviewed  Last menstrual period 04/23/2023, not currently breastfeeding.  No intake or output data in the 24 hours ending 05/01/23 1325  SHEENT examination revealed NC/AT, EOMI.  Examination of the chest revealed CTA.  Examination of the heart revealed RRR.  Examination of the abdomen revealed soft, non distended RUQ tenderness.  The neuromuscular examination was NL.          Data:   All laboratory data reviewed  No results found for any visits on 05/02/23.  All imaging studies reviewed by me.     Messi Neil MD, FACS

## 2023-05-02 ENCOUNTER — HOSPITAL ENCOUNTER (OUTPATIENT)
Facility: CLINIC | Age: 38
Discharge: HOME | End: 2023-05-02
Attending: SPECIALIST | Admitting: SPECIALIST
Payer: COMMERCIAL

## 2023-05-02 ENCOUNTER — ANESTHESIA (OUTPATIENT)
Dept: GASTROENTEROLOGY | Facility: CLINIC | Age: 38
End: 2023-05-02
Payer: COMMERCIAL

## 2023-05-02 VITALS
SYSTOLIC BLOOD PRESSURE: 115 MMHG | OXYGEN SATURATION: 100 % | HEART RATE: 81 BPM | TEMPERATURE: 98.4 F | RESPIRATION RATE: 12 BRPM | DIASTOLIC BLOOD PRESSURE: 72 MMHG

## 2023-05-02 DIAGNOSIS — R10.11 RUQ ABDOMINAL PAIN: Primary | ICD-10-CM

## 2023-05-02 LAB — UPPER GI ENDOSCOPY: NORMAL

## 2023-05-02 PROCEDURE — 43239 EGD BIOPSY SINGLE/MULTIPLE: CPT | Performed by: SPECIALIST

## 2023-05-02 PROCEDURE — 258N000003 HC RX IP 258 OP 636: Performed by: NURSE ANESTHETIST, CERTIFIED REGISTERED

## 2023-05-02 PROCEDURE — 88305 TISSUE EXAM BY PATHOLOGIST: CPT | Mod: TC | Performed by: SPECIALIST

## 2023-05-02 PROCEDURE — 250N000011 HC RX IP 250 OP 636: Performed by: NURSE ANESTHETIST, CERTIFIED REGISTERED

## 2023-05-02 PROCEDURE — 370N000017 HC ANESTHESIA TECHNICAL FEE, PER MIN: Performed by: SPECIALIST

## 2023-05-02 PROCEDURE — 250N000009 HC RX 250: Performed by: NURSE ANESTHETIST, CERTIFIED REGISTERED

## 2023-05-02 PROCEDURE — 88305 TISSUE EXAM BY PATHOLOGIST: CPT | Mod: 26 | Performed by: PATHOLOGY

## 2023-05-02 RX ORDER — LIDOCAINE 40 MG/G
CREAM TOPICAL
Status: DISCONTINUED | OUTPATIENT
Start: 2023-05-02 | End: 2023-05-02 | Stop reason: HOSPADM

## 2023-05-02 RX ORDER — OXYCODONE HYDROCHLORIDE 5 MG/1
10 TABLET ORAL
Status: DISCONTINUED | OUTPATIENT
Start: 2023-05-02 | End: 2023-05-02 | Stop reason: HOSPADM

## 2023-05-02 RX ORDER — SODIUM CHLORIDE, SODIUM LACTATE, POTASSIUM CHLORIDE, CALCIUM CHLORIDE 600; 310; 30; 20 MG/100ML; MG/100ML; MG/100ML; MG/100ML
INJECTION, SOLUTION INTRAVENOUS CONTINUOUS
Status: DISCONTINUED | OUTPATIENT
Start: 2023-05-02 | End: 2023-05-02 | Stop reason: HOSPADM

## 2023-05-02 RX ORDER — PROPOFOL 10 MG/ML
INJECTION, EMULSION INTRAVENOUS CONTINUOUS PRN
Status: DISCONTINUED | OUTPATIENT
Start: 2023-05-02 | End: 2023-05-02

## 2023-05-02 RX ORDER — LIDOCAINE HYDROCHLORIDE 20 MG/ML
INJECTION, SOLUTION INFILTRATION; PERINEURAL PRN
Status: DISCONTINUED | OUTPATIENT
Start: 2023-05-02 | End: 2023-05-02

## 2023-05-02 RX ORDER — PROPOFOL 10 MG/ML
INJECTION, EMULSION INTRAVENOUS PRN
Status: DISCONTINUED | OUTPATIENT
Start: 2023-05-02 | End: 2023-05-02

## 2023-05-02 RX ORDER — OXYCODONE HYDROCHLORIDE 5 MG/1
5 TABLET ORAL
Status: DISCONTINUED | OUTPATIENT
Start: 2023-05-02 | End: 2023-05-02 | Stop reason: HOSPADM

## 2023-05-02 RX ORDER — FENTANYL CITRATE 50 UG/ML
25 INJECTION, SOLUTION INTRAMUSCULAR; INTRAVENOUS
Status: DISCONTINUED | OUTPATIENT
Start: 2023-05-02 | End: 2023-05-02 | Stop reason: HOSPADM

## 2023-05-02 RX ORDER — SODIUM CHLORIDE, SODIUM LACTATE, POTASSIUM CHLORIDE, CALCIUM CHLORIDE 600; 310; 30; 20 MG/100ML; MG/100ML; MG/100ML; MG/100ML
INJECTION, SOLUTION INTRAVENOUS CONTINUOUS PRN
Status: DISCONTINUED | OUTPATIENT
Start: 2023-05-02 | End: 2023-05-02

## 2023-05-02 RX ADMIN — PROPOFOL 250 MCG/KG/MIN: 10 INJECTION, EMULSION INTRAVENOUS at 13:51

## 2023-05-02 RX ADMIN — PROPOFOL 100 MG: 10 INJECTION, EMULSION INTRAVENOUS at 13:52

## 2023-05-02 RX ADMIN — PROPOFOL 100 MG: 10 INJECTION, EMULSION INTRAVENOUS at 13:51

## 2023-05-02 RX ADMIN — SODIUM CHLORIDE, POTASSIUM CHLORIDE, SODIUM LACTATE AND CALCIUM CHLORIDE: 600; 310; 30; 20 INJECTION, SOLUTION INTRAVENOUS at 13:40

## 2023-05-02 RX ADMIN — LIDOCAINE HYDROCHLORIDE 100 MG: 20 INJECTION, SOLUTION INFILTRATION; PERINEURAL at 13:51

## 2023-05-02 ASSESSMENT — ACTIVITIES OF DAILY LIVING (ADL): ADLS_ACUITY_SCORE: 33

## 2023-05-02 NOTE — ANESTHESIA POSTPROCEDURE EVALUATION
Patient: Yvette Weiss    Procedure: Procedure(s):  Esophagoscopy, gastroscopy, duodenoscopy (EGD), combined with biopsies       Anesthesia Type:  MAC    Note:  Disposition: Outpatient   Postop Pain Control: Uneventful            Sign Out: Well controlled pain   PONV: No   Neuro/Psych: Uneventful            Sign Out: Acceptable/Baseline neuro status   Airway/Respiratory: Uneventful            Sign Out: Acceptable/Baseline resp. status   CV/Hemodynamics: Uneventful            Sign Out: Acceptable CV status   Other NRE: NONE   DID A NON-ROUTINE EVENT OCCUR? No    Event details/Postop Comments:  Pt was happy with anesthesia care.  No complications.  I will follow up with the pt if needed.           Last vitals:  Vitals Value Taken Time   /62 05/02/23 1420   Temp     Pulse 79 05/02/23 1420   Resp     SpO2 100 % 05/02/23 1425   Vitals shown include unvalidated device data.    Electronically Signed By: JUNE Ely CRNA  May 2, 2023  2:26 PM

## 2023-05-02 NOTE — ANESTHESIA CARE TRANSFER NOTE
Patient: Yvette Weiss    Procedure: Procedure(s):  Esophagoscopy, gastroscopy, duodenoscopy (EGD), combined with biopsies       Diagnosis: Nausea [R11.0]  Diagnosis Additional Information: No value filed.    Anesthesia Type:   MAC     Note:    Oropharynx: spontaneously breathing  Level of Consciousness: drowsy  Oxygen Supplementation: room air    Independent Airway: airway patency satisfactory and stable  Dentition: dentition unchanged  Vital Signs Stable: post-procedure vital signs reviewed and stable  Report to RN Given: handoff report given  Patient transferred to: Phase II    Handoff Report: Identifed the Patient, Identified the Reponsible Provider, Reviewed the pertinent medical history, Discussed the surgical course, Reviewed Intra-OP anesthesia mangement and issues during anesthesia, Set expectations for post-procedure period and Allowed opportunity for questions and acknowledgement of understanding      Vitals:  Vitals Value Taken Time   /62 05/02/23 1420   Temp     Pulse 79 05/02/23 1420   Resp     SpO2 100 % 05/02/23 1424   Vitals shown include unvalidated device data.    Electronically Signed By: JUNE Ely [facility]  May 2, 2023  2:25 PM

## 2023-05-02 NOTE — DISCHARGE INSTRUCTIONS
[facility]    Home Care Following Endoscopy          Activity:  You have just undergone an endoscopic procedure usually performed with conscious sedation.  Do not work or operate machinery (including a car) for at least 12 hours.    I encourage you to walk and attempt to pass this air as soon as possible.    Diet:  Return to the diet you were on before your procedure but eat lightly for the first 12-24 hours.  Drink plenty of water.  Resume any regular medications unless otherwise advised by your physician.  Please begin any new medication prescribed as a result of your procedure as directed by your physician.   If you had any biopsy or polyp removed please refrain from aspirin or aspirin products for 2 days.  If on Coumadin please restart as instructed by your physician.   Pain:  You may take Tylenol as needed for pain.  Expected Recovery:  You can expect some mild abdominal fullness and/or discomfort due to the air used to inflate your intestinal tract. It is also normal to have a mild sore throat after upper endoscopy.    Call Your Physician if You Have:  After Upper Endoscopy:  Shoulder, back or chest pain.  Difficulty breathing or swallowing.  Vomiting blood.  Any questions or concerns about your recovery, please call [phone] or after hours [facility] ([phone]) Nurse Advice Line.    Follow-up Care:  You did have polyps/biopsy tissue sample(s) removed.  The polyps/biopsy tissue sample(s) will be sent to pathology.    You should receive letter in your My Chart from Dr. Neil   with your results within 1-2 weeks. If you do not participate in My Chart a physical letter will come in the mail in 2-3 weeks.  Please call if you have not received a notification of your results.  If asked to return to clinic please make an appointment 1 week after your procedure.  Call [phone].

## 2023-05-04 LAB
PATH REPORT.COMMENTS IMP SPEC: NORMAL
PATH REPORT.COMMENTS IMP SPEC: NORMAL
PATH REPORT.FINAL DX SPEC: NORMAL
PATH REPORT.GROSS SPEC: NORMAL
PATH REPORT.MICROSCOPIC SPEC OTHER STN: NORMAL
PATH REPORT.RELEVANT HX SPEC: NORMAL
PHOTO IMAGE: NORMAL

## 2023-05-24 ENCOUNTER — HOSPITAL ENCOUNTER (OUTPATIENT)
Dept: ULTRASOUND IMAGING | Facility: CLINIC | Age: 38
Discharge: HOME | End: 2023-05-24
Attending: SPECIALIST | Admitting: SPECIALIST
Payer: COMMERCIAL

## 2023-05-24 ENCOUNTER — HOSPITAL ENCOUNTER (OUTPATIENT)
Dept: NUCLEAR MEDICINE | Facility: CLINIC | Age: 38
Setting detail: NUCLEAR MEDICINE
Discharge: HOME | End: 2023-05-24
Attending: SPECIALIST | Admitting: SPECIALIST
Payer: COMMERCIAL

## 2023-05-24 DIAGNOSIS — R10.11 RUQ ABDOMINAL PAIN: ICD-10-CM

## 2023-05-24 PROCEDURE — 343N000001 HC RX 343: Performed by: SPECIALIST

## 2023-05-24 PROCEDURE — 250N000011 HC RX IP 250 OP 636: Performed by: SPECIALIST

## 2023-05-24 PROCEDURE — 78227 HEPATOBIL SYST IMAGE W/DRUG: CPT

## 2023-05-24 PROCEDURE — A9537 TC99M MEBROFENIN: HCPCS | Performed by: SPECIALIST

## 2023-05-24 PROCEDURE — 258N000003 HC RX IP 258 OP 636: Performed by: SPECIALIST

## 2023-05-24 PROCEDURE — 76705 ECHO EXAM OF ABDOMEN: CPT

## 2023-05-24 RX ORDER — KIT FOR THE PREPARATION OF TECHNETIUM TC 99M MEBROFENIN 45 MG/10ML
5 INJECTION, POWDER, LYOPHILIZED, FOR SOLUTION INTRAVENOUS ONCE
Status: COMPLETED | OUTPATIENT
Start: 2023-05-24 | End: 2023-05-24

## 2023-05-24 RX ADMIN — MEBROFENIN 5 MILLICURIE: 45 INJECTION, POWDER, LYOPHILIZED, FOR SOLUTION INTRAVENOUS at 08:10

## 2023-05-24 RX ADMIN — SODIUM CHLORIDE 1.2 MCG: 9 INJECTION INTRAMUSCULAR; INTRAVENOUS; SUBCUTANEOUS at 09:41

## 2023-06-01 ENCOUNTER — HEALTH MAINTENANCE LETTER (OUTPATIENT)
Age: 38
End: 2023-06-01

## 2023-06-01 ENCOUNTER — TELEPHONE (OUTPATIENT)
Dept: SURGERY | Facility: CLINIC | Age: 38
End: 2023-06-01

## 2023-06-01 ENCOUNTER — OFFICE VISIT (OUTPATIENT)
Dept: SURGERY | Facility: CLINIC | Age: 38
End: 2023-06-01
Payer: COMMERCIAL

## 2023-06-01 VITALS
HEIGHT: 62 IN | SYSTOLIC BLOOD PRESSURE: 120 MMHG | BODY MASS INDEX: 24.29 KG/M2 | TEMPERATURE: 97.3 F | WEIGHT: 132 LBS | DIASTOLIC BLOOD PRESSURE: 82 MMHG

## 2023-06-01 DIAGNOSIS — K82.8 BILIARY DYSKINESIA: Primary | ICD-10-CM

## 2023-06-01 PROCEDURE — 99213 OFFICE O/P EST LOW 20 MIN: CPT | Performed by: SPECIALIST

## 2023-06-01 ASSESSMENT — PAIN SCALES - GENERAL: PAINLEVEL: NO PAIN (1)

## 2023-06-01 NOTE — LETTER
6/1/2023         RE: Yvette Weiss  [address] [address]        Dear Colleague,    Thank you for referring your patient, Yvette Weiss, to the [facility]. Please see a copy of my visit note below.    Follow-up for EGD    Subjective:  Patient continues to have nausea and now reports her pain is localized more to the right upper quadrant occasionally radiates to her back.  She was seen several weeks ago for an EGD that was essentially normal.  She subsequently had a HIDA scan that revealed a low ejection fraction and the injection of CCK did reproduce her symptoms.  Also of note, she is on Xarelto for a PE diagnosed in March.    Objective:  B/P: 120/82, T: 97.3, P: Data Unavailable, R: Data Unavailable  Abdomen: Soft, nondistended, right upper quadrant tenderness to deep palpation.    HIDA  Examination:  NM HEPATOBILIARY SCAN WITH GB EF       Date: 5/24/2023 10:17 AM.     Indication:   RUQ pain radiating to back; RUQ abdominal pain      Technique:     The patient received 5.0 mCi of Tc-99m Choletec intravenously. Images  were obtained out through 60 minutes.   At 60 minutes the patient  received 1.2 mcg of CCK over 12 minutes minutes. An additional  30minutes of images were obtained after the gall bladder contraction  intervention.     Findings:     There is prompt clearance of the radionuclide from the blood pool into  the liver. By 10 minutes there is clear visualization of the  intrahepatic ducts as well as the upper common bile duct. By 15  minutes there is visualization of the gallbladder. After 60 minutes  there is emptying from the common bile duct into the small bowel.     After CCK administration the Gallbladder ejection fraction was  measured at 31%.   Gallbladder ejection fraction plateaued at 17  minutes.     Enterogastric reflux was not present.                                                                      Impression:  Gallbladder ejection  fraction is borderline decreased, these findings  can be seen with mild biliary dyskinesia.           DEANDRA CORONA MD      Assessment/plan:  This is a 38-year-old lady presenting with nausea and right upper quadrant pain.  She had an abnormal HIDA scan with the injection of CCK reproducing her symptoms.  I suspect that biliary dyskinesia versus chronic cholecystitis with a cause of her symptoms.  I discussed the signs with the patient she expressed understanding.  After discussion the patient plan at this time is to proceed to laparoscopic cholecystectomy.  She will be given perioperative Lovenox for her PE diagnosed 3 months ago.   The procedure, risks, benefits, and alternatives were discussed and the patient agrees to proceed.    Messi Neil MD, FACS        Again, thank you for allowing me to participate in the care of your patient.        Sincerely,        Messi Neil MD

## 2023-06-01 NOTE — TELEPHONE ENCOUNTER
Type of surgery: CHOLECYSTECTOMY, LAPAROSCOPIC    Location of surgery: [facility]  Date and time of surgery: 6/23  Surgeon: Rashaad  Pre-Op Appt Date: 6/16  Post-Op Appt Date: 7/3   Packet sent out: Yes  Pre-cert/Authorization completed:  Not Applicable  Date: na    
no wheezing/no dyspnea/no cough

## 2023-06-01 NOTE — PROGRESS NOTES
Follow-up for EGD    Subjective:  Patient continues to have nausea and now reports her pain is localized more to the right upper quadrant occasionally radiates to her back.  She was seen several weeks ago for an EGD that was essentially normal.  She subsequently had a HIDA scan that revealed a low ejection fraction and the injection of CCK did reproduce her symptoms.  Also of note, she is on Xarelto for a PE diagnosed in March.    Objective:  B/P: 120/82, T: 97.3, P: Data Unavailable, R: Data Unavailable  Abdomen: Soft, nondistended, right upper quadrant tenderness to deep palpation.    HIDA  Examination:  NM HEPATOBILIARY SCAN WITH GB EF       Date: 5/24/2023 10:17 AM.     Indication:   RUQ pain radiating to back; RUQ abdominal pain      Technique:     The patient received 5.0 mCi of Tc-99m Choletec intravenously. Images  were obtained out through 60 minutes.   At 60 minutes the patient  received 1.2 mcg of CCK over 12 minutes minutes. An additional  30minutes of images were obtained after the gall bladder contraction  intervention.     Findings:     There is prompt clearance of the radionuclide from the blood pool into  the liver. By 10 minutes there is clear visualization of the  intrahepatic ducts as well as the upper common bile duct. By 15  minutes there is visualization of the gallbladder. After 60 minutes  there is emptying from the common bile duct into the small bowel.     After CCK administration the Gallbladder ejection fraction was  measured at 31%.   Gallbladder ejection fraction plateaued at 17  minutes.     Enterogastric reflux was not present.                                                                      Impression:  Gallbladder ejection fraction is borderline decreased, these findings  can be seen with mild biliary dyskinesia.           DEANDRA CORONA MD      Assessment/plan:  This is a 38-year-old lady presenting with nausea and right upper quadrant pain.  She had an abnormal HIDA scan  with the injection of CCK reproducing her symptoms.  I suspect that biliary dyskinesia versus chronic cholecystitis with a cause of her symptoms.  I discussed the signs with the patient she expressed understanding.  After discussion the patient plan at this time is to proceed to laparoscopic cholecystectomy.  She will be given perioperative Lovenox for her PE diagnosed 3 months ago.   The procedure, risks, benefits, and alternatives were discussed and the patient agrees to proceed.    Messi Neil MD, FACS

## 2023-06-13 ENCOUNTER — OFFICE VISIT (OUTPATIENT)
Dept: GASTROENTEROLOGY | Facility: CLINIC | Age: 38
End: 2023-06-13
Attending: NURSE PRACTITIONER
Payer: COMMERCIAL

## 2023-06-13 VITALS
TEMPERATURE: 98.1 F | SYSTOLIC BLOOD PRESSURE: 118 MMHG | BODY MASS INDEX: 24.29 KG/M2 | HEIGHT: 62 IN | DIASTOLIC BLOOD PRESSURE: 68 MMHG | WEIGHT: 132 LBS

## 2023-06-13 DIAGNOSIS — R10.13 ABDOMINAL PAIN, EPIGASTRIC: ICD-10-CM

## 2023-06-13 DIAGNOSIS — K82.8 DYSKINESIA OF GALLBLADDER: Primary | ICD-10-CM

## 2023-06-13 DIAGNOSIS — K82.4 GALLBLADDER POLYP: ICD-10-CM

## 2023-06-13 DIAGNOSIS — K42.9 UMBILICAL HERNIA WITHOUT OBSTRUCTION AND WITHOUT GANGRENE: ICD-10-CM

## 2023-06-13 DIAGNOSIS — K62.5 BRBPR (BRIGHT RED BLOOD PER RECTUM): ICD-10-CM

## 2023-06-13 PROCEDURE — 99214 OFFICE O/P EST MOD 30 MIN: CPT | Performed by: NURSE PRACTITIONER

## 2023-06-13 ASSESSMENT — PAIN SCALES - GENERAL: PAINLEVEL: MILD PAIN (2)

## 2023-06-13 NOTE — PATIENT INSTRUCTIONS
"It was a pleasure taking care of you today.  I've included a brief summary of our discussion and care plan from today's visit below.  Please review this information with your primary care provider.  ______________________________________________________________________    My recommendations are summarized as follows:    As we discussed today, I recommend to proceed with gallbladder surgery due to dysfunction of your gallbladder and polyps within the gallbladder.Your surgeon may decide to do umbilical hernia repair at the same time.    2. Avoid greasy and spicy foods, caffeine and alcohol to prevent attacks of upper abdominal pain.    3. Your upper GI endoscopy was within normal limits. No signs of infection or inflammation. Negative screening for celiac disease.    4. Please continue monitoring your lower abdominal pain and blood per rectum. We will discuss indications for colonoscopy on your next follow up visit.    Return to GI Clinic in 3 months to review your progress.    ______________________________________________________________________  What is the gallbladder?  The gallbladder is a small, pear-shaped organ that is tucked under the liver. It stores bile, a fluid that is made in the liver and helps the body break down fat. When you eat a meal that has fat in it, the gallbladder empties the bile into a tube called the \"bile duct.\" The bile duct carries the bile into the small intestine to help with digestion.  What is gallbladder removal?  Gallbladder removal is surgery to remove the gallbladder. This surgery is also called \"cholecystectomy.\"  There are 2 main ways to remove the gallbladder:  ?Laparoscopic surgery - This means the surgeon uses a \"laparoscope,\" a long, thin tube that has a light and a tiny camera on the end to see inside the body. (The laparoscope is sometimes called a \"scope\" for short.) For this type of surgery, the surgeon makes a few small incisions. Then they insert the scope through 1 " "of the incisions and other special tools through the other incisions. Next the surgeon uses the scope and the tools to do the operation. Most gallbladder removals in the [place] are done using laparoscopic surgery. Sometimes, though, open surgery is necessary because the gallbladder and bile duct are too infected or scarred to do laparoscopic surgery safely.  ?Open surgery - This means the surgeon makes an incision in your belly big enough to do the surgery directly.  Why would a person have their gallbladder removed?  The most common reason is to treat gallstones. Gallstones are small stones that form inside the gallbladder. These stones can block the ducts that bile flows through. The stones can cause inflammation, pain, and other symptoms.  This article is about gallbladder removal to treat gallstones. People can also have gallbladder removal to treat cancer of the gallbladder. But if cancer is the reason for the surgery, it usually involves removing more than just the gallbladder.  What happens before gallbladder removal?  Before the surgery:  ?Your doctor will order blood tests to check if your liver is working normally.  ?Your doctor will order an imaging test called an ultrasound, which uses sound waves to create pictures of the inside of your body. This test will show if you have gallstones and if the bile duct is enlarged or blocked.  ?If the bile duct is blocked by a stone, your doctor might order a procedure called \"ERCP.\" ERCP stands for \"endoscopic retrograde cholangiopancreatography.\" During this procedure, a doctor slides a tube called an \"endoscope\" down your throat. The endoscope has a tiny camera and a light on the end. The doctor advances the tube past your stomach and into your intestine to the spot where the bile duct empties into the intestine. Then the doctor injects a special dye that shows up on X-ray into the ducts that leads to the gallbladder, liver, and pancreas and takes an X-ray. That way " "they can see where the dye goes. In some cases, the doctor might also use the endoscope to remove some gallstones or widen the duct opening so that small stones can pass through.  ?Your doctor might give you antibiotics through a thin tube that goes into a vein, called an \"IV,\" to reduce the risk of infection during and after surgery.  What are the benefits of gallbladder removal?  If you have the surgery to treat gallstones, the main benefit is that it will make your symptoms go away.  What are the risks of gallbladder removal?  The risks of the surgery are low, but they can include:  ?Damage to other bile ducts near the gallbladder  ?Bile leaks  ?Bleeding  ?Damage to the bowels  ?Infection  ?Leaving gallstones \"trapped\" in the bile duct (which would need to be removed with ERCP after surgery)  In some cases, a person will continue to have belly pain even after the gallbladder is removed.  What will my recovery be like?  Recovery is a little different depending on whether you have laparoscopic or open surgery.  ?If you have laparoscopic surgery, you will probably be able to leave the hospital the same day you have surgery. But there is some chance you will need to stay overnight. Even though the cuts on the belly are small, the operation inside was the same as if you had open surgery. Your doctor will want you to rest and avoid heavy lifting, sports, and swimming for at least a week.  ?If you have open surgery, you will probably stay in the hospital for 1 to 2 days. While there, do your best to start walking as soon as possible. Also, do the breathing exercises that your nurse recommends. After you go home, you should be able to do most of your normal activities, but you should avoid heavy lifting, sports, and swimming for a few weeks.  If you are taking an opioid pain medicine during recovery, you might get constipated. Take a stool softener to prevent this problem.  If you develop any the following symptoms in " the weeks after surgery, call your doctor:  ?Fever or chills  ?Redness or swelling around the cuts from your surgery  ?Nausea or vomiting  ?Cramping or more severe belly pain  ?Bloating (feeling like your belly is full of gas)  ?Yellow skin or eyes  ?Urine that is very dark in color  Will the surgery affect how my body breaks down food?  The surgery does not affect digestion very much. But about half the people who have surgery have mild symptoms afterward, including loose bowel movements, gas, or bloating. These symptoms usually get better.         ______________________________________________________________________    Who do I call with any questions after my visit?  Please be in touch if there are any further questions that arise following today's visit.  There are multiple ways to contact your gastroenterology care team.      During business hours, you may reach a Gastroenterology nurse at [phone], option 3.     To schedule or reschedule an appointment, please call [phone].   To schedule your imaging studies (CT, MRI, ultrasound)  call [phone] (or toll-free # [phone])  To schedule your lab work at [facility], please call [phone]    You can always send a secure message through Card[employer] Guard.  Cardiac Guard messages are answered by your nurse or doctor typically within 24 hours.  Please allow extra time on weekends and holidays.      For urgent/emergent questions after business hours, you may reach the on-call GI Fellow by contacting the [facility]  at [phone].    In order for your refill to be processed in a timely fashion, it is your responsibility to ensure you follow the recommendations from your provider regarding your laboratory studies and follow up appointments.       How will I get the results of any tests ordered?    You will receive all of your results.  If you have signed up for Cardiac Guard, any tests ordered at your visit will be available  to you after your physician reviews them.  Typically this takes 1-2 weeks.  If there are urgent results that require a change in your care plan, your physician or nurse will call you to discuss the next steps.   What is [employer]?  [employer]ida is a secure way for you to access all of your healthcare records from the [facility].  It is a web based computer program, so you can sign on to it from any location.  It also allows you to send secure messages to your care team.  I recommend signing up for Emida access if you have not already done so and are comfortable with using a computer.    How to I schedule a follow-up visit?  If you did not schedule a follow-up visit today, please call [phone] to schedule a follow-up office visit.      Sincerely,  FAB Cloud,  [facility],  Division of Gastroenterology   ([facility])

## 2023-06-13 NOTE — PROGRESS NOTES
"Gastroenterology CLINIC VISIT, RETURN PATIENT    CC/REFERRING PROVIDER: Adalgisa Cohen  REASON FOR CONSULTATION: follow up    HPI: 38 year old female presenting/ was referred to GI clinic for a follow up on symptoms consistent with IBS per her PCP- abdominal bloating and alternation of diarrhea and constipation. Patient was also complaining of lower substernal pain, worse with breathing.   She reported some postprandial nausea and epigastric pain that radiated to mid-back. I initially referred the patient to upper GI endoscopy and CT of abdomen. She found to have right lung PE, which thought to be related to starting estrogen pill. She was started on Xeralto.  Upper GI endoscopy was unremarkable. Found to have small gallbladder polyps on abdominal ultrasound, approximately 4 mm in size. HIDA was suggestive for dyskinesia of gallbladder with EF of 31%. Patient was referred to general surgery to consider cholecystectomy.    Today, she reported resolution of substernal pain. Complains of postprandial nausea with gagging and R. sided abdominal pain that radiates to her back. Symptoms last for about 30 minutes. Subside without interventions. Also, complains of intermittent lower abdominal pain and pain during coitus. Has been evaluated by GYN. She also reported \"coppery color\" mucus wrapped around her stools and red blood on toilet paper after defecation. Known hx of hemorrhoids.    Copy from initial visit HPI:  Patient was referred to GI clinic by gynecologist for evaluation of abdominal discomfort. Ongoing issue but got worse over the past year. Pain is all over her abdomen, more severe in upper quadrants and epigastric region and then more dull in lower quadrants. Some substernal discomfort. Stated that the pain varies by intensity and character and could be cramping, dull, ucca, and sharp. Pain is also migrates through the abdomen. Not related to food intake or bowel/bladder elimination.  Stated that she had " "acid reflux during her pregnancy, but no issues anymore. No dysphagia symptoms.  Bowel habit consists of alternation of diarrhea and constipation and th pattern has always been irregular, \"goes in cycles\". Has up to 2-3 mushy stools during her diarrhea episodes. No black stools or blood in stools.  No weight loss. No changes in appetite. No chronic NSAIDs use.    Previous endoscopy:  5/3/2023 EGD  Findings:        The Z-line was regular and was found 36 cm from the incisors.        The entire examined stomach was normal. Biopsies were taken with a cold        forceps for Helicobacter pylori testing.        The examined duodenum was normal. Biopsies for histology were taken with        a cold forceps for evaluation of celiac disease.     Final Diagnosis   A(1).  Duodenum, biopsy:  -Small intestinal mucosa with inactive chronic peptic duodenitis.  -Normal villous architecture identified and no prominence in intraepithelial lymphocytes seen.  -Negative for luminal organisms.  -Negative for dysplasia or malignancy.      B(2). Stomach, antrum, biopsy  - Oxyntic type gastric mucosa with mild chronic inflammation.  - Negative for H. Pylori organisms on routine stains.  - Negative for intestinal metaplasia.   -Negative for dysplasia or malignancy         ROS: 10pt ROS performed and otherwise negative.    PAST MEDICAL HISTORY:  Past Medical History:   Diagnosis Date     Anxiety     as teenager     ASCUS on Pap smear 2011    neg for High Risk HPV     Chickenpox     x2     Depression     as teenager     Gestational diabetes 2016     Lateral epicondylitis 9/14/2004     Postpartum depression 4/2014    mild       PREVIOUS ABDOMINAL/GYNECOLOGIC SURGERIES:    Past Surgical History:   Procedure Laterality Date     ESOPHAGOSCOPY, GASTROSCOPY, DUODENOSCOPY (EGD), COMBINED N/A 5/2/2023    Procedure: Esophagoscopy, gastroscopy, duodenoscopy (EGD), combined with biopsies;  Surgeon: Messi Neil MD;  Location:  GI     ORTHOPEDIC " SURGERY  1997    ORIF left wrist         PERTINENT MEDICATIONS:  Current Outpatient Medications   Medication Sig Dispense Refill     rivaroxaban ANTICOAGULANT (XARELTO) 20 MG TABS tablet Take 1 tablet (20 mg) by mouth daily (with dinner) 90 tablet 0       No other OTC/herbal/supplements reported by patient.    SOCIAL HISTORY:  Social History     Socioeconomic History     Marital status:      Spouse name: Wesly     Number of children: 1     Years of education: Not on file     Highest education level: Not on file   Occupational History     Not on file   Tobacco Use     Smoking status: Former     Types: Cigarettes     Quit date: 2010     Years since quittin.4     Smokeless tobacco: Never   Vaping Use     Vaping status: Never Used   Substance and Sexual Activity     Alcohol use: Yes     Comment: occ     Drug use: No     Sexual activity: Not Currently     Partners: Male   Other Topics Concern     Parent/sibling w/ CABG, MI or angioplasty before 65F 55M? No     Comment: Adopted   Social History Narrative     Not on file     Social Determinants of Health     Financial Resource Strain: Not on file   Food Insecurity: Not on file   Transportation Needs: Not on file   Physical Activity: Not on file   Stress: Not on file   Social Connections: Not on file   Intimate Partner Violence: Not on file   Housing Stability: Not on file       FAMILY HISTORY:  Denies colon/panc/esophageal/other GI CA, no other Rodriguez or other HPS-related Juvecnio. No IBD/celiac, no other AI/liver/thyroid disease.    Family History   Adopted: Yes   Problem Relation Age of Onset     Alcohol/Drug Mother         A&D     Alcohol/Drug Father         A&D     Parkinsonism Paternal Grandmother      Cerebrovascular Disease Maternal Grandmother         x3     Other - See Comments Sister         fetal alcohol syndrome     Cancer Maternal Grandfather         lung     Other Cancer Paternal Grandfather         Lung       PHYSICAL EXAMINATION:  /68    "Temp 98.1  F (36.7  C) (Temporal)   Ht 1.577 m (5' 2.1\")   Wt 59.9 kg (132 lb)   LMP 04/23/2023 (Exact Date)   BMI 24.07 kg/m    LMP 04/23/2023 (Exact Date)     General: Patient appears well in no acute distress.   Skin: No visualized rash or lesions on visualized skin  Eyes: EOMI, no erythema, sclera icterus or discharge noted  Resp: breathing comfortably without accessory muscle usage, speaking in full sentences, no cough. Lung sounds clear  Card: Regular and rhythmic S1 and S2. No gallop or rub. No murmur. No LE edema.  Abdomen: Active bowel sounds X 4 quadrants. Soft to palpation.Tenderness in left abdomen to light palpation, radiates to left flank.  No guarding or rebound tenderness   MSK: Appears to have normal range of motion based on visualized movements  Neurologic: No apparent tremors, facial movements symmetric  Psych: affect normal, alert and oriented    PERTINENT STUDIES Reviewed in EMR  5/24/23 HIDA  Impression:  Gallbladder ejection fraction is borderline decreased, these findings  can be seen with mild biliary dyskinesia.    5/24/23 Abdominal ultrasound  IMPRESSION:  1.  No sonographic findings to explain pain. Specifically no findings  to suggest acute cholecystitis.  2.  Incidental low risk gallbladder polyps measuring up to 4 mm. No  follow-up is recommended for low risk polyps 6 mm or smaller.    ASSESSMENT/PLAN:  38 year old female  presented  to GI clinic for a follow up on abdominal pain, bloating, and alternation of diarrhea and constipation.Complains of postprandial nausea and right upper quadrant pain. We sent the patient for upper GI endoscopy, abdominal CT, and HIDA.  She had an abnormal HIDA scan with the injection of CCK reproducing her symptoms and EF of 31%. Biliary dyskinesia versus chronic cholecystitis is a likely cause of her symptoms. Patient was referred to a general surgery and has cholecystectomy scheduled for 6/23/23.   Patient also found to have a right lung PE on CT " scan. Currently, on Xeralto. She will be given perioperative Lovenox for her PE prophylaxis.  We discussed possible etiology of lower abdominal discomfort. Patient was seen by GYN in the past, who recommended to try estrogen, which probably provoked PE. It was discontinued. Complains of dyspareunia. Suggested to follow up with GYN after gallbladder surgery.  Concerns for alternation of diarrhea and constipation, which could be due to IBS  and dyskinesia of gallbladder. Reported intermittent BRBPR; stated that she has hemorrhoids. Suggested to continue monitoring her symptoms. If persist until next follow up visit, colonoscopy referral could be considered. May try a fiber supplement.      ICD-10-CM    1. Dyskinesia of gallbladder  K82.8       2. Gallbladder polyp  K82.4       3. Abdominal pain, epigastric  R10.13       4. BRBPR (bright red blood per rectum)  K62.5       5. Umbilical hernia without obstruction and without gangrene  K42.9         Patient verbalized understanding and appreciation of care provided. Stated that all of the questions were answered to her/his satisfaction.  RTC in 3 months    Thank you for this consultation. It was a pleasure to participate in the care of this patient; please contact us with any further questions.    JUNE Cloud, FNP-C  [facility]  Gastroenterology Department  [place], [place]    This note was created with Dragon voice recognition software, and while reviewed for accuracy, inadvertent minor typographic errors may occur. Please contact the provider if you have any questions.

## 2023-06-13 NOTE — LETTER
"    6/13/2023         RE: Yvette Weiss  [address] [id number]        Dear Colleague,    Thank you for referring your patient, Yvette Weiss, to the [facility]. Please see a copy of my visit note below.    Gastroenterology CLINIC VISIT, RETURN PATIENT    CC/REFERRING PROVIDER: Adalgisa Cohen  REASON FOR CONSULTATION: follow up    HPI: 38 year old female presenting/ was referred to GI clinic for a follow up on symptoms consistent with IBS per her PCP- abdominal bloating and alternation of diarrhea and constipation. Patient was also complaining of lower substernal pain, worse with breathing.   She reported some postprandial nausea and epigastric pain that radiated to mid-back. I initially referred the patient to upper GI endoscopy and CT of abdomen. She found to have right lung PE, which thought to be related to starting estrogen pill. She was started on Xeralto.  Upper GI endoscopy was unremarkable. Found to have small gallbladder polyps on abdominal ultrasound, approximately 4 mm in size. HIDA was suggestive for dyskinesia of gallbladder with EF of 31%. Patient was referred to general surgery to consider cholecystectomy.    Today, she reported resolution of substernal pain. Complains of postprandial nausea with gagging and R. sided abdominal pain that radiates to her back. Symptoms last for about 30 minutes. Subside without interventions. Also, complains of intermittent lower abdominal pain and pain during coitus. Has been evaluated by GYN. She also reported \"coppery color\" mucus wrapped around her stools and red blood on toilet paper after defecation. Known hx of hemorrhoids.    Copy from initial visit HPI:  Patient was referred to GI clinic by gynecologist for evaluation of abdominal discomfort. Ongoing issue but got worse over the past year. Pain is all over her abdomen, more severe in upper quadrants and epigastric region and then more dull in lower quadrants. Some " "substernal discomfort. Stated that the pain varies by intensity and character and could be cramping, dull, cuca, and sharp. Pain is also migrates through the abdomen. Not related to food intake or bowel/bladder elimination.  Stated that she had acid reflux during her pregnancy, but no issues anymore. No dysphagia symptoms.  Bowel habit consists of alternation of diarrhea and constipation and th pattern has always been irregular, \"goes in cycles\". Has up to 2-3 mushy stools during her diarrhea episodes. No black stools or blood in stools.  No weight loss. No changes in appetite. No chronic NSAIDs use.    Previous endoscopy:  5/3/2023 EGD  Findings:        The Z-line was regular and was found 36 cm from the incisors.        The entire examined stomach was normal. Biopsies were taken with a cold        forceps for Helicobacter pylori testing.        The examined duodenum was normal. Biopsies for histology were taken with        a cold forceps for evaluation of celiac disease.     Final Diagnosis   A(1).  Duodenum, biopsy:  -Small intestinal mucosa with inactive chronic peptic duodenitis.  -Normal villous architecture identified and no prominence in intraepithelial lymphocytes seen.  -Negative for luminal organisms.  -Negative for dysplasia or malignancy.      B(2). Stomach, antrum, biopsy  - Oxyntic type gastric mucosa with mild chronic inflammation.  - Negative for H. Pylori organisms on routine stains.  - Negative for intestinal metaplasia.   -Negative for dysplasia or malignancy         ROS: 10pt ROS performed and otherwise negative.    PAST MEDICAL HISTORY:  Past Medical History:   Diagnosis Date     Anxiety     as teenager     ASCUS on Pap smear 2011    neg for High Risk HPV     Chickenpox     x2     Depression     as teenager     Gestational diabetes 2016     Lateral epicondylitis 9/14/2004     Postpartum depression 4/2014    mild       PREVIOUS ABDOMINAL/GYNECOLOGIC SURGERIES:    Past Surgical History: "   Procedure Laterality Date     ESOPHAGOSCOPY, GASTROSCOPY, DUODENOSCOPY (EGD), COMBINED N/A 2023    Procedure: Esophagoscopy, gastroscopy, duodenoscopy (EGD), combined with biopsies;  Surgeon: Messi Neil MD;  Location:  GI     ORTHOPEDIC SURGERY      ORIF left wrist         PERTINENT MEDICATIONS:  Current Outpatient Medications   Medication Sig Dispense Refill     rivaroxaban ANTICOAGULANT (XARELTO) 20 MG TABS tablet Take 1 tablet (20 mg) by mouth daily (with dinner) 90 tablet 0       No other OTC/herbal/supplements reported by patient.    SOCIAL HISTORY:  Social History     Socioeconomic History     Marital status:      Spouse name: Wesly     Number of children: 1     Years of education: Not on file     Highest education level: Not on file   Occupational History     Not on file   Tobacco Use     Smoking status: Former     Types: Cigarettes     Quit date: 2010     Years since quittin.4     Smokeless tobacco: Never   Vaping Use     Vaping status: Never Used   Substance and Sexual Activity     Alcohol use: Yes     Comment: occ     Drug use: No     Sexual activity: Not Currently     Partners: Male   Other Topics Concern     Parent/sibling w/ CABG, MI or angioplasty before 65F 55M? No     Comment: Adopted   Social History Narrative     Not on file     Social Determinants of Health     Financial Resource Strain: Not on file   Food Insecurity: Not on file   Transportation Needs: Not on file   Physical Activity: Not on file   Stress: Not on file   Social Connections: Not on file   Intimate Partner Violence: Not on file   Housing Stability: Not on file       FAMILY HISTORY:  Denies colon/panc/esophageal/other GI CA, no other Rodriguez or other HPS-related Juvencio. No IBD/celiac, no other AI/liver/thyroid disease.    Family History   Adopted: Yes   Problem Relation Age of Onset     Alcohol/Drug Mother         A&D     Alcohol/Drug Father         A&D     Parkinsonism Paternal Grandmother       "Cerebrovascular Disease Maternal Grandmother         x3     Other - See Comments Sister         fetal alcohol syndrome     Cancer Maternal Grandfather         lung     Other Cancer Paternal Grandfather         Lung       PHYSICAL EXAMINATION:  /68   Temp 98.1  F (36.7  C) (Temporal)   Ht 1.577 m (5' 2.1\")   Wt 59.9 kg (132 lb)   LMP 04/23/2023 (Exact Date)   BMI 24.07 kg/m    LMP 04/23/2023 (Exact Date)     General: Patient appears well in no acute distress.   Skin: No visualized rash or lesions on visualized skin  Eyes: EOMI, no erythema, sclera icterus or discharge noted  Resp: breathing comfortably without accessory muscle usage, speaking in full sentences, no cough. Lung sounds clear  Card: Regular and rhythmic S1 and S2. No gallop or rub. No murmur. No LE edema.  Abdomen: Active bowel sounds X 4 quadrants. Soft to palpation.Tenderness in left abdomen to light palpation, radiates to left flank.  No guarding or rebound tenderness   MSK: Appears to have normal range of motion based on visualized movements  Neurologic: No apparent tremors, facial movements symmetric  Psych: affect normal, alert and oriented    PERTINENT STUDIES Reviewed in EMR  5/24/23 HIDA  Impression:  Gallbladder ejection fraction is borderline decreased, these findings  can be seen with mild biliary dyskinesia.    5/24/23 Abdominal ultrasound  IMPRESSION:  1.  No sonographic findings to explain pain. Specifically no findings  to suggest acute cholecystitis.  2.  Incidental low risk gallbladder polyps measuring up to 4 mm. No  follow-up is recommended for low risk polyps 6 mm or smaller.    ASSESSMENT/PLAN:  38 year old female  presented  to GI clinic for a follow up on abdominal pain, bloating, and alternation of diarrhea and constipation.Complains of postprandial nausea and right upper quadrant pain. We sent the patient for upper GI endoscopy, abdominal CT, and HIDA.  She had an abnormal HIDA scan with the injection of CCK " reproducing her symptoms and EF of 31%. Biliary dyskinesia versus chronic cholecystitis is a likely cause of her symptoms. Patient was referred to a general surgery and has cholecystectomy scheduled for 6/23/23.   Patient also found to have a right lung PE on CT scan. Currently, on Xeralto. She will be given perioperative Lovenox for her PE prophylaxis.  We discussed possible etiology of lower abdominal discomfort. Patient was seen by GYN in the past, who recommended to try estrogen, which probably provoked PE. It was discontinued. Complains of dyspareunia. Suggested to follow up with GYN after gallbladder surgery.  Concerns for alternation of diarrhea and constipation, which could be due to IBS  and dyskinesia of gallbladder. Reported intermittent BRBPR; stated that she has hemorrhoids. Suggested to continue monitoring her symptoms. If persist until next follow up visit, colonoscopy referral could be considered. May try a fiber supplement.      ICD-10-CM    1. Dyskinesia of gallbladder  K82.8       2. Gallbladder polyp  K82.4       3. Abdominal pain, epigastric  R10.13       4. BRBPR (bright red blood per rectum)  K62.5       5. Umbilical hernia without obstruction and without gangrene  K42.9         Patient verbalized understanding and appreciation of care provided. Stated that all of the questions were answered to her/his satisfaction.  RTC in 3 months    Thank you for this consultation. It was a pleasure to participate in the care of this patient; please contact us with any further questions.    JUNE Cloud, FNP-C  [facility]  Gastroenterology Department  [place], [place]    This note was created with Dragon voice recognition software, and while reviewed for accuracy, inadvertent minor typographic errors may occur. Please contact the provider if you have any questions.      Again, thank you for allowing me to participate in the care of your patient.        Sincerely,        JUNE CLOUD CNP

## 2023-06-19 ENCOUNTER — OFFICE VISIT (OUTPATIENT)
Dept: FAMILY MEDICINE | Facility: CLINIC | Age: 38
End: 2023-06-19
Payer: COMMERCIAL

## 2023-06-19 VITALS
RESPIRATION RATE: 16 BRPM | BODY MASS INDEX: 23.57 KG/M2 | OXYGEN SATURATION: 98 % | SYSTOLIC BLOOD PRESSURE: 110 MMHG | WEIGHT: 133 LBS | HEART RATE: 83 BPM | HEIGHT: 63 IN | TEMPERATURE: 98.7 F | DIASTOLIC BLOOD PRESSURE: 62 MMHG

## 2023-06-19 DIAGNOSIS — F12.20 CANNABIS DEPENDENCE (H): ICD-10-CM

## 2023-06-19 DIAGNOSIS — Z91.040 LATEX ALLERGY: ICD-10-CM

## 2023-06-19 DIAGNOSIS — Z01.818 PREOP GENERAL PHYSICAL EXAM: Primary | ICD-10-CM

## 2023-06-19 DIAGNOSIS — K82.8 BILIARY DYSKINESIA: ICD-10-CM

## 2023-06-19 DIAGNOSIS — I27.82 CHRONIC PULMONARY EMBOLISM WITHOUT ACUTE COR PULMONALE, UNSPECIFIED PULMONARY EMBOLISM TYPE (H): ICD-10-CM

## 2023-06-19 PROCEDURE — 99214 OFFICE O/P EST MOD 30 MIN: CPT | Performed by: STUDENT IN AN ORGANIZED HEALTH CARE EDUCATION/TRAINING PROGRAM

## 2023-06-19 ASSESSMENT — PAIN SCALES - GENERAL: PAINLEVEL: MILD PAIN (3)

## 2023-06-19 NOTE — PROGRESS NOTES
[facility]  [address] [place] [address]  Phone: [phone]  Fax: [phone]  Primary Provider: No Ref-Primary, Physician  Pre-op Performing Provider: NANCY COLEMAN    PREOPERATIVE EVALUATION:  Today's date: 6/19/2023    Yvette Weiss is a 38 year old female who presents for a preoperative evaluation.      6/19/2023    10:15 AM   Additional Questions   Roomed by Diana     Surgical Information:  Surgery/Procedure: CHOLECYSTECTOMY, LAPAROSCOPIC  Surgery Location: Dr. Messi Neil  Surgeon: PHOR  Surgery Date: 6/23/2023  Time of Surgery: 0730  Where patient plans to recover: At home with family  Fax number for surgical facility: Note does not need to be faxed, will be available electronically in Epic.    Assessment & Plan     The proposed surgical procedure is considered INTERMEDIATE risk.    Preop general physical exam  Biliary dyskinesia  Latex allergy  Patient with chronic abdominal pain. On workup, was found to have right sided PE. Not fully imaged, however, so unsure the extent of clot burden. Was symptomatic with central chest tightness, which is now resolved. Continues to have abdominal pain and has planned yohana later this week. Reviewed labs from March including CBC and BMP with normal hemoglobin and renal function. Note: patient has latex allergy    Chronic pulmonary embolism without acute cor pulmonale, unspecified pulmonary embolism type (H)  Patient would like to ensure clots fully treated prior to discontinuation of DOAC. In discussion, will opt to continue treatment through 30 days from surgery day, but will obtain CT prior to discontinuation.   - CT Chest Pulmonary Embolism w Contrast    Cannabis dependence (H)  3x daily use. Smokes.          - No identified additional risk factors other than previously addressed    Antiplatelet or Anticoagulation Medication Instructions:   - apixaban (Eliquis), edoxaban (Savaysa), rivaroxaban (Xarelto): Bleeding  risk is moderate or high for this procedure AND CrCl  (>=) 50 mL/min. HOLD 2 days before surgery.     Additional Medication Instructions:  Patient is on no additional chronic medications    RECOMMENDATION:  APPROVAL GIVEN to proceed with proposed procedure, without further diagnostic evaluation.    I spent a total of 30 minutes on the day of the visit.   Time spent by me doing chart review, history and exam, documentation and further activities per the note      Subjective       HPI related to upcoming procedure:   Biliary dyskinesia         6/13/2023     4:41 PM   Preop Questions   1. Have you ever had a heart attack or stroke? No   2. Have you ever had surgery on your heart or blood vessels, such as a stent placement, a coronary artery bypass, or surgery on an artery in your head, neck, heart, or legs? No   3. Do you have chest pain with activity? No   4. Do you have a history of  heart failure? No   5. Do you currently have a cold, bronchitis or symptoms of other infection? No   6. Do you have a cough, shortness of breath, or wheezing? No   7. Do you or anyone in your family have previous history of blood clots? UNKNOWN - had clot this April 8. Do you or does anyone in your family have a serious bleeding problem such as prolonged bleeding following surgeries or cuts? No   9. Have you ever had problems with anemia or been told to take iron pills? No   10. Have you had any abnormal blood loss such as black, tarry or bloody stools, or abnormal vaginal bleeding? No   11. Have you ever had a blood transfusion? No   12. Are you willing to have a blood transfusion if it is medically needed before, during, or after your surgery? Yes   13. Have you or any of your relatives ever had problems with anesthesia? No   14. Do you have sleep apnea, excessive snoring or daytime drowsiness? No   15. Do you have any artifical heart valves or other implanted medical devices like a pacemaker, defibrillator, or continuous glucose  monitor? No   16. Do you have artificial joints? No   17. Are you allergic to latex? YES   18. Is there any chance that you may be pregnant? No       Preoperative Review of :   reviewed - no record of controlled substances prescribed.    Status of Chronic Conditions:    Review of Systems  Positive for ongoing abdominal and low back pain. Negative for chest pain, shortness of breath, headaches, vision changes, leg pain, leg swelling, urinary symptoms.     Constitutional, neuro, ENT, endocrine, pulmonary, cardiac, gastrointestinal, genitourinary, musculoskeletal, integument and psychiatric systems are negative, except as otherwise noted.        Patient Active Problem List    Diagnosis Date Noted     Vulvar cyst 04/02/2020     Priority: Medium     CARDIOVASCULAR SCREENING; LDL GOAL LESS THAN 160 02/09/2012     Priority: Medium     24 hour clinic contact list given 02/09/2012     Priority: Medium     EMERGENCY CARE PLAN  Presenting Problem Signs and Symptoms Treatment Plan    Questions or conerns during clinic hours    I will call the clinic directly     Questions or conerns outside clinic hours    I will call the 24 hour nurse line at [phone]    Patient needs to schedule an appointment    I will call the 24 hour scheduling team at [phone] or clinic directly    Same day treatment     I will call the clinic first, nurse line if after hours, urgent care and express care if needed                                Past Medical History:   Diagnosis Date     Anxiety     as teenager     ASCUS on Pap smear 2011    neg for High Risk HPV     Chickenpox     x2     Depression     as teenager     Gestational diabetes 2016     Lateral epicondylitis 09/14/2004     Postpartum depression 04/2014    mild     Past Surgical History:   Procedure Laterality Date     ESOPHAGOSCOPY, GASTROSCOPY, DUODENOSCOPY (EGD), COMBINED N/A 5/2/2023    Procedure: Esophagoscopy, gastroscopy, duodenoscopy (EGD), combined with biopsies;   "Surgeon: Messi Neil MD;  Location:  GI     ORTHOPEDIC SURGERY      ORIF left wrist     Current Outpatient Medications   Medication Sig Dispense Refill     rivaroxaban ANTICOAGULANT (XARELTO) 20 MG TABS tablet Take 1 tablet (20 mg) by mouth daily (with dinner) (Patient taking differently: Take 20 mg by mouth daily (with dinner) Stopped 2023 due to surgery 23) 90 tablet 0       Allergies   Allergen Reactions     Latex Hives        Social History     Tobacco Use     Smoking status: Former     Packs/day: 1.00     Years: 10.00     Pack years: 10.00     Types: Cigarettes     Quit date: 2010     Years since quittin.4     Smokeless tobacco: Never   Vaping Use     Vaping status: Never Used   Substance Use Topics     Alcohol use: Yes     Comment: occ     Family History   Adopted: Yes   Problem Relation Age of Onset     Alcohol/Drug Mother         A&D     Alcohol/Drug Father         A&D     Parkinsonism Paternal Grandmother      Cerebrovascular Disease Maternal Grandmother         x3     Other - See Comments Sister         fetal alcohol syndrome     Cancer Maternal Grandfather         lung     Other Cancer Paternal Grandfather         Lung     History   Drug Use No         Objective     /62 (Cuff Size: Adult Regular)   Pulse 83   Temp 98.7  F (37.1  C) (Tympanic)   Resp 16   Ht 1.588 m (5' 2.5\")   Wt 60.3 kg (133 lb)   LMP 2023 (Exact Date)   SpO2 98%   BMI 23.94 kg/m      Physical Exam    GENERAL APPEARANCE: healthy, alert and no distress     EYES: EOMI     HENT: ear canals and TM's normal and nose and mouth without ulcers or lesions     NECK: no adenopathy, no asymmetry, masses, or scars     RESP: lungs clear to auscultation - no rales, rhonchi or wheezes     CV: regular rates and rhythm, normal S1 S2, no S3 or S4 and no murmur, click or rub     ABDOMEN:  soft, tender to RUQ and RLQ, mild to LLQ, no HSM or masses and bowel sounds normal     MS: extremities normal- no gross " deformities noted, no evidence of inflammation in joints, FROM in all extremities.     SKIN: no suspicious lesions or rashes     NEURO: Normal strength and tone, sensory exam grossly normal, mentation intact and speech normal     PSYCH: mentation appears normal. and affect normal/bright     LYMPHATICS: No cervical adenopathy      Recent Labs   Lab Test 03/16/23  1616 12/08/22  1219   HGB 12.2 13.2    196    138   POTASSIUM 4.1 4.1   CR 0.76 0.78        Diagnostics:  No labs were ordered during this visit.   No EKG required for low risk surgery (cataract, skin procedure, breast biopsy, etc).    Revised Cardiac Risk Index (RCRI):  The patient has the following serious cardiovascular risks for perioperative complications:   - No serious cardiac risks = 0 points     RCRI Interpretation: 0 points: Class I (very low risk - 0.4% complication rate)           Signed Electronically by: Estrella Nesbitt MD  Copy of this evaluation report is provided to requesting physician.

## 2023-06-19 NOTE — PATIENT INSTRUCTIONS
For informational purposes only. Not to replace the advice of your health care provider. Copyright   2003,  [facility] [facility]. All rights reserved. Clinically reviewed by Pauline King MD. [employer] [id number] - REV .  Preparing for Your Surgery  Getting started  A nurse will call you to review your health history and instructions. They will give you an arrival time based on your scheduled surgery time. Please be ready to share:  Your doctor's clinic name and phone number  Your medical, surgical, and anesthesia history  A list of allergies and sensitivities  A list of medicines, including herbal treatments and over-the-counter drugs  Whether the patient has a legal guardian (ask how to send us the papers in advance)  Please tell us if you're pregnant--or if there's any chance you might be pregnant. Some surgeries may injure a fetus (unborn baby), so they require a pregnancy test. Surgeries that are safe for a fetus don't always need a test, and you can choose whether to have one.   If you have a child who's having surgery, please ask for a copy of Preparing for Your Child's Surgery.    Preparing for surgery  Within 10 to 30 days of surgery: Have a pre-op exam (sometimes called an H&P, or History and Physical). This can be done at a clinic or pre-operative center.  If you're having a , you may not need this exam. Talk to your care team.  At your pre-op exam, talk to your care team about all medicines you take. If you need to stop any medicines before surgery, ask when to start taking them again.  We do this for your safety. Many medicines can make you bleed too much during surgery. Some change how well surgery (anesthesia) drugs work.  Call your insurance company to let them know you're having surgery. (If you don't have insurance, call [phone].)  Call your clinic if there's any change in your health. This includes signs of a cold or flu (sore throat, runny nose, cough, rash, fever). It  also includes a scrape or scratch near the surgery site.  If you have questions on the day of surgery, call your hospital or surgery center.  Eating and drinking guidelines  For your safety: Unless your surgeon tells you otherwise, follow the guidelines below.  Eat and drink as usual until 8 hours before you arrive for surgery. After that, no food or milk.  Drink clear liquids until 2 hours before you arrive. These are liquids you can see through, like water, Gatorade, and Propel Water. They also include plain black coffee and tea (no cream or milk), candy, and breath mints. You can spit out gum when you arrive.  If you drink alcohol: Stop drinking it the night before surgery.  If your care team tells you to take medicine on the morning of surgery, it's okay to take it with a sip of water.  Preventing infection  Shower or bathe the night before and morning of your surgery. Follow the instructions your clinic gave you. (If no instructions, use regular soap.)  Don't shave or clip hair near your surgery site. We'll remove the hair if needed.  Don't smoke or vape the morning of surgery. You may chew nicotine gum up to 2 hours before surgery. A nicotine patch is okay.  Note: Some surgeries require you to completely quit smoking and nicotine. Check with your surgeon.  Your care team will make every effort to keep you safe from infection. We will:  Clean our hands often with soap and water (or an alcohol-based hand rub).  Clean the skin at your surgery site with a special soap that kills germs.  Give you a special gown to keep you warm. (Cold raises the risk of infection.)  Wear special hair covers, masks, gowns and gloves during surgery.  Give antibiotic medicine, if prescribed. Not all surgeries need antibiotics.  What to bring on the day of surgery  Photo ID and insurance card  Copy of your health care directive, if you have one  Glasses and hearing aids (bring cases)  You can't wear contacts during surgery  Inhaler and  eye drops, if you use them (tell us about these when you arrive)  CPAP machine or breathing device, if you use them  A few personal items, if spending the night  If you have . . .  A pacemaker, ICD (cardiac defibrillator) or other implant: Bring the ID card.  An implanted stimulator: Bring the remote control.  A legal guardian: Bring a copy of the certified (court-stamped) guardianship papers.  Please remove any jewelry, including body piercings. Leave jewelry and other valuables at home.  If you're going home the day of surgery  You must have a responsible adult drive you home. They should stay with you overnight as well.  If you don't have someone to stay with you, and you aren't safe to go home alone, we may keep you overnight. Insurance often won't pay for this.  After surgery  If it's hard to control your pain or you need more pain medicine, please call your surgeon's office.  Questions?   If you have any questions for your care team, list them here: _________________________________________________________________________________________________________________________________________________________________________ ____________________________________ ____________________________________ ____________________________________    How to Take Your Medication Before Surgery  - Take all of your medications before surgery except as noted below  - HOLD (do not take) Xarelto for 2 days before surgery.

## 2023-06-19 NOTE — H&P (VIEW-ONLY)
[facility]  [address] [place] [id number]  Phone: [phone]  Fax: [phone]  Primary Provider: No Ref-Primary, Physician  Pre-op Performing Provider: NANCY COLEMAN    PREOPERATIVE EVALUATION:  Today's date: 6/19/2023    Yvette Weiss is a 38 year old female who presents for a preoperative evaluation.      6/19/2023    10:15 AM   Additional Questions   Roomed by Diana     Surgical Information:  Surgery/Procedure: CHOLECYSTECTOMY, LAPAROSCOPIC  Surgery Location: Dr. Messi Neil  Surgeon: PHOR  Surgery Date: 6/23/2023  Time of Surgery: 0730  Where patient plans to recover: At home with family  Fax number for surgical facility: Note does not need to be faxed, will be available electronically in Epic.    Assessment & Plan     The proposed surgical procedure is considered INTERMEDIATE risk.    Preop general physical exam  Biliary dyskinesia  Latex allergy  Patient with chronic abdominal pain. On workup, was found to have right sided PE. Not fully imaged, however, so unsure the extent of clot burden. Was symptomatic with central chest tightness, which is now resolved. Continues to have abdominal pain and has planned yohana later this week. Reviewed labs from March including CBC and BMP with normal hemoglobin and renal function. Note: patient has latex allergy    Chronic pulmonary embolism without acute cor pulmonale, unspecified pulmonary embolism type (H)  Patient would like to ensure clots fully treated prior to discontinuation of DOAC. In discussion, will opt to continue treatment through 30 days from surgery day, but will obtain CT prior to discontinuation.   - CT Chest Pulmonary Embolism w Contrast    Cannabis dependence (H)  3x daily use. Smokes.          - No identified additional risk factors other than previously addressed    Antiplatelet or Anticoagulation Medication Instructions:   - apixaban (Eliquis), edoxaban (Savaysa), rivaroxaban (Xarelto): Bleeding  risk is moderate or high for this procedure AND CrCl  (>=) 50 mL/min. HOLD 2 days before surgery.     Additional Medication Instructions:  Patient is on no additional chronic medications    RECOMMENDATION:  APPROVAL GIVEN to proceed with proposed procedure, without further diagnostic evaluation.    I spent a total of 30 minutes on the day of the visit.   Time spent by me doing chart review, history and exam, documentation and further activities per the note      Subjective       HPI related to upcoming procedure:   Biliary dyskinesia         6/13/2023     4:41 PM   Preop Questions   1. Have you ever had a heart attack or stroke? No   2. Have you ever had surgery on your heart or blood vessels, such as a stent placement, a coronary artery bypass, or surgery on an artery in your head, neck, heart, or legs? No   3. Do you have chest pain with activity? No   4. Do you have a history of  heart failure? No   5. Do you currently have a cold, bronchitis or symptoms of other infection? No   6. Do you have a cough, shortness of breath, or wheezing? No   7. Do you or anyone in your family have previous history of blood clots? UNKNOWN - had clot this April 8. Do you or does anyone in your family have a serious bleeding problem such as prolonged bleeding following surgeries or cuts? No   9. Have you ever had problems with anemia or been told to take iron pills? No   10. Have you had any abnormal blood loss such as black, tarry or bloody stools, or abnormal vaginal bleeding? No   11. Have you ever had a blood transfusion? No   12. Are you willing to have a blood transfusion if it is medically needed before, during, or after your surgery? Yes   13. Have you or any of your relatives ever had problems with anesthesia? No   14. Do you have sleep apnea, excessive snoring or daytime drowsiness? No   15. Do you have any artifical heart valves or other implanted medical devices like a pacemaker, defibrillator, or continuous glucose  monitor? No   16. Do you have artificial joints? No   17. Are you allergic to latex? YES   18. Is there any chance that you may be pregnant? No       Preoperative Review of :   reviewed - no record of controlled substances prescribed.    Status of Chronic Conditions:    Review of Systems  Positive for ongoing abdominal and low back pain. Negative for chest pain, shortness of breath, headaches, vision changes, leg pain, leg swelling, urinary symptoms.     Constitutional, neuro, ENT, endocrine, pulmonary, cardiac, gastrointestinal, genitourinary, musculoskeletal, integument and psychiatric systems are negative, except as otherwise noted.        Patient Active Problem List    Diagnosis Date Noted     Vulvar cyst 04/02/2020     Priority: Medium     CARDIOVASCULAR SCREENING; LDL GOAL LESS THAN 160 02/09/2012     Priority: Medium     24 hour clinic contact list given 02/09/2012     Priority: Medium     EMERGENCY CARE PLAN  Presenting Problem Signs and Symptoms Treatment Plan    Questions or conerns during clinic hours    I will call the clinic directly     Questions or conerns outside clinic hours    I will call the 24 hour nurse line at [phone]    Patient needs to schedule an appointment    I will call the 24 hour scheduling team at [phone] or clinic directly    Same day treatment     I will call the clinic first, nurse line if after hours, urgent care and express care if needed                                Past Medical History:   Diagnosis Date     Anxiety     as teenager     ASCUS on Pap smear 2011    neg for High Risk HPV     Chickenpox     x2     Depression     as teenager     Gestational diabetes 2016     Lateral epicondylitis 09/14/2004     Postpartum depression 04/2014    mild     Past Surgical History:   Procedure Laterality Date     ESOPHAGOSCOPY, GASTROSCOPY, DUODENOSCOPY (EGD), COMBINED N/A 5/2/2023    Procedure: Esophagoscopy, gastroscopy, duodenoscopy (EGD), combined with biopsies;   "Surgeon: Messi Neil MD;  Location:  GI     ORTHOPEDIC SURGERY      ORIF left wrist     Current Outpatient Medications   Medication Sig Dispense Refill     rivaroxaban ANTICOAGULANT (XARELTO) 20 MG TABS tablet Take 1 tablet (20 mg) by mouth daily (with dinner) (Patient taking differently: Take 20 mg by mouth daily (with dinner) Stopped 2023 due to surgery 23) 90 tablet 0       Allergies   Allergen Reactions     Latex Hives        Social History     Tobacco Use     Smoking status: Former     Packs/day: 1.00     Years: 10.00     Pack years: 10.00     Types: Cigarettes     Quit date: 2010     Years since quittin.4     Smokeless tobacco: Never   Vaping Use     Vaping status: Never Used   Substance Use Topics     Alcohol use: Yes     Comment: occ     Family History   Adopted: Yes   Problem Relation Age of Onset     Alcohol/Drug Mother         A&D     Alcohol/Drug Father         A&D     Parkinsonism Paternal Grandmother      Cerebrovascular Disease Maternal Grandmother         x3     Other - See Comments Sister         fetal alcohol syndrome     Cancer Maternal Grandfather         lung     Other Cancer Paternal Grandfather         Lung     History   Drug Use No         Objective     /62 (Cuff Size: Adult Regular)   Pulse 83   Temp 98.7  F (37.1  C) (Tympanic)   Resp 16   Ht 1.588 m (5' 2.5\")   Wt 60.3 kg (133 lb)   LMP 2023 (Exact Date)   SpO2 98%   BMI 23.94 kg/m      Physical Exam    GENERAL APPEARANCE: healthy, alert and no distress     EYES: EOMI     HENT: ear canals and TM's normal and nose and mouth without ulcers or lesions     NECK: no adenopathy, no asymmetry, masses, or scars     RESP: lungs clear to auscultation - no rales, rhonchi or wheezes     CV: regular rates and rhythm, normal S1 S2, no S3 or S4 and no murmur, click or rub     ABDOMEN:  soft, tender to RUQ and RLQ, mild to LLQ, no HSM or masses and bowel sounds normal     MS: extremities normal- no gross " deformities noted, no evidence of inflammation in joints, FROM in all extremities.     SKIN: no suspicious lesions or rashes     NEURO: Normal strength and tone, sensory exam grossly normal, mentation intact and speech normal     PSYCH: mentation appears normal. and affect normal/bright     LYMPHATICS: No cervical adenopathy      Recent Labs   Lab Test 03/16/23  1616 12/08/22  1219   HGB 12.2 13.2    196    138   POTASSIUM 4.1 4.1   CR 0.76 0.78        Diagnostics:  No labs were ordered during this visit.   No EKG required for low risk surgery (cataract, skin procedure, breast biopsy, etc).    Revised Cardiac Risk Index (RCRI):  The patient has the following serious cardiovascular risks for perioperative complications:   - No serious cardiac risks = 0 points     RCRI Interpretation: 0 points: Class I (very low risk - 0.4% complication rate)           Signed Electronically by: Estrella Nesbitt MD  Copy of this evaluation report is provided to requesting physician.

## 2023-06-23 ENCOUNTER — HOSPITAL ENCOUNTER (OUTPATIENT)
Facility: CLINIC | Age: 38
Discharge: HOME | End: 2023-06-23
Attending: SPECIALIST | Admitting: SPECIALIST
Payer: COMMERCIAL

## 2023-06-23 ENCOUNTER — ANESTHESIA EVENT (OUTPATIENT)
Dept: SURGERY | Facility: CLINIC | Age: 38
End: 2023-06-23
Payer: COMMERCIAL

## 2023-06-23 ENCOUNTER — ANESTHESIA (OUTPATIENT)
Dept: SURGERY | Facility: CLINIC | Age: 38
End: 2023-06-23
Payer: COMMERCIAL

## 2023-06-23 VITALS
OXYGEN SATURATION: 100 % | DIASTOLIC BLOOD PRESSURE: 70 MMHG | HEART RATE: 71 BPM | SYSTOLIC BLOOD PRESSURE: 113 MMHG | BODY MASS INDEX: 23.94 KG/M2 | WEIGHT: 133 LBS | TEMPERATURE: 97.4 F | RESPIRATION RATE: 18 BRPM

## 2023-06-23 DIAGNOSIS — G89.18 POST-OP PAIN: Primary | ICD-10-CM

## 2023-06-23 PROCEDURE — 250N000009 HC RX 250: Performed by: NURSE ANESTHETIST, CERTIFIED REGISTERED

## 2023-06-23 PROCEDURE — 999N000141 HC STATISTIC PRE-PROCEDURE NURSING ASSESSMENT: Performed by: SPECIALIST

## 2023-06-23 PROCEDURE — 360N000076 HC SURGERY LEVEL 3, PER MIN: Performed by: SPECIALIST

## 2023-06-23 PROCEDURE — 370N000017 HC ANESTHESIA TECHNICAL FEE, PER MIN: Performed by: SPECIALIST

## 2023-06-23 PROCEDURE — 250N000026 HC DESFLURANE, PER MIN: Performed by: SPECIALIST

## 2023-06-23 PROCEDURE — 47562 LAPAROSCOPIC CHOLECYSTECTOMY: CPT | Performed by: SPECIALIST

## 2023-06-23 PROCEDURE — 88304 TISSUE EXAM BY PATHOLOGIST: CPT | Mod: TC | Performed by: SPECIALIST

## 2023-06-23 PROCEDURE — 250N000011 HC RX IP 250 OP 636: Performed by: NURSE ANESTHETIST, CERTIFIED REGISTERED

## 2023-06-23 PROCEDURE — 88304 TISSUE EXAM BY PATHOLOGIST: CPT | Mod: 26 | Performed by: PATHOLOGY

## 2023-06-23 PROCEDURE — 710N000012 HC RECOVERY PHASE 2, PER MINUTE: Performed by: SPECIALIST

## 2023-06-23 PROCEDURE — 250N000011 HC RX IP 250 OP 636: Performed by: SPECIALIST

## 2023-06-23 PROCEDURE — 710N000010 HC RECOVERY PHASE 1, LEVEL 2, PER MIN: Performed by: SPECIALIST

## 2023-06-23 PROCEDURE — 250N000009 HC RX 250: Performed by: SPECIALIST

## 2023-06-23 PROCEDURE — 258N000003 HC RX IP 258 OP 636: Performed by: NURSE ANESTHETIST, CERTIFIED REGISTERED

## 2023-06-23 PROCEDURE — 272N000001 HC OR GENERAL SUPPLY STERILE: Performed by: SPECIALIST

## 2023-06-23 PROCEDURE — 250N000013 HC RX MED GY IP 250 OP 250 PS 637: Performed by: SPECIALIST

## 2023-06-23 RX ORDER — OXYCODONE HYDROCHLORIDE 5 MG/1
5-10 TABLET ORAL EVERY 6 HOURS PRN
Qty: 10 TABLET | Refills: 0 | Status: SHIPPED | OUTPATIENT
Start: 2023-06-23 | End: 2023-07-03

## 2023-06-23 RX ORDER — HYDRALAZINE HYDROCHLORIDE 20 MG/ML
2.5-5 INJECTION INTRAMUSCULAR; INTRAVENOUS EVERY 10 MIN PRN
Status: DISCONTINUED | OUTPATIENT
Start: 2023-06-23 | End: 2023-06-23 | Stop reason: HOSPADM

## 2023-06-23 RX ORDER — CEFAZOLIN SODIUM/WATER 2 G/20 ML
2 SYRINGE (ML) INTRAVENOUS SEE ADMIN INSTRUCTIONS
Status: DISCONTINUED | OUTPATIENT
Start: 2023-06-23 | End: 2023-06-23 | Stop reason: HOSPADM

## 2023-06-23 RX ORDER — SODIUM CHLORIDE, SODIUM LACTATE, POTASSIUM CHLORIDE, CALCIUM CHLORIDE 600; 310; 30; 20 MG/100ML; MG/100ML; MG/100ML; MG/100ML
INJECTION, SOLUTION INTRAVENOUS CONTINUOUS PRN
Status: DISCONTINUED | OUTPATIENT
Start: 2023-06-23 | End: 2023-06-23

## 2023-06-23 RX ORDER — OXYCODONE AND ACETAMINOPHEN 5; 325 MG/1; MG/1
2 TABLET ORAL
Status: COMPLETED | OUTPATIENT
Start: 2023-06-23 | End: 2023-06-23

## 2023-06-23 RX ORDER — PROPOFOL 10 MG/ML
INJECTION, EMULSION INTRAVENOUS CONTINUOUS PRN
Status: DISCONTINUED | OUTPATIENT
Start: 2023-06-23 | End: 2023-06-23

## 2023-06-23 RX ORDER — CEFAZOLIN SODIUM/WATER 2 G/20 ML
2 SYRINGE (ML) INTRAVENOUS
Status: COMPLETED | OUTPATIENT
Start: 2023-06-23 | End: 2023-06-23

## 2023-06-23 RX ORDER — ONDANSETRON 4 MG/1
4 TABLET, ORALLY DISINTEGRATING ORAL EVERY 30 MIN PRN
Status: DISCONTINUED | OUTPATIENT
Start: 2023-06-23 | End: 2023-06-23 | Stop reason: HOSPADM

## 2023-06-23 RX ORDER — BUPIVACAINE HYDROCHLORIDE AND EPINEPHRINE 2.5; 5 MG/ML; UG/ML
INJECTION, SOLUTION INFILTRATION; PERINEURAL PRN
Status: DISCONTINUED | OUTPATIENT
Start: 2023-06-23 | End: 2023-06-23 | Stop reason: HOSPADM

## 2023-06-23 RX ORDER — DIMENHYDRINATE 50 MG/ML
INJECTION, SOLUTION INTRAMUSCULAR; INTRAVENOUS PRN
Status: DISCONTINUED | OUTPATIENT
Start: 2023-06-23 | End: 2023-06-23

## 2023-06-23 RX ORDER — HYDROMORPHONE HYDROCHLORIDE 1 MG/ML
0.5 INJECTION, SOLUTION INTRAMUSCULAR; INTRAVENOUS; SUBCUTANEOUS EVERY 5 MIN PRN
Status: DISCONTINUED | OUTPATIENT
Start: 2023-06-23 | End: 2023-06-23 | Stop reason: HOSPADM

## 2023-06-23 RX ORDER — SODIUM CHLORIDE, SODIUM LACTATE, POTASSIUM CHLORIDE, CALCIUM CHLORIDE 600; 310; 30; 20 MG/100ML; MG/100ML; MG/100ML; MG/100ML
INJECTION, SOLUTION INTRAVENOUS CONTINUOUS
Status: DISCONTINUED | OUTPATIENT
Start: 2023-06-23 | End: 2023-06-23 | Stop reason: HOSPADM

## 2023-06-23 RX ORDER — ENOXAPARIN SODIUM 100 MG/ML
40 INJECTION SUBCUTANEOUS ONCE
Status: COMPLETED | OUTPATIENT
Start: 2023-06-23 | End: 2023-06-23

## 2023-06-23 RX ORDER — ALBUTEROL SULFATE 0.83 MG/ML
2.5 SOLUTION RESPIRATORY (INHALATION) EVERY 4 HOURS PRN
Status: DISCONTINUED | OUTPATIENT
Start: 2023-06-23 | End: 2023-06-23 | Stop reason: HOSPADM

## 2023-06-23 RX ORDER — KETOROLAC TROMETHAMINE 30 MG/ML
INJECTION, SOLUTION INTRAMUSCULAR; INTRAVENOUS PRN
Status: DISCONTINUED | OUTPATIENT
Start: 2023-06-23 | End: 2023-06-23

## 2023-06-23 RX ORDER — FENTANYL CITRATE 50 UG/ML
50 INJECTION, SOLUTION INTRAMUSCULAR; INTRAVENOUS EVERY 5 MIN PRN
Status: DISCONTINUED | OUTPATIENT
Start: 2023-06-23 | End: 2023-06-23 | Stop reason: HOSPADM

## 2023-06-23 RX ORDER — LIDOCAINE HYDROCHLORIDE 20 MG/ML
INJECTION, SOLUTION INFILTRATION; PERINEURAL PRN
Status: DISCONTINUED | OUTPATIENT
Start: 2023-06-23 | End: 2023-06-23

## 2023-06-23 RX ORDER — METOPROLOL TARTRATE 1 MG/ML
1-2 INJECTION, SOLUTION INTRAVENOUS EVERY 5 MIN PRN
Status: DISCONTINUED | OUTPATIENT
Start: 2023-06-23 | End: 2023-06-23 | Stop reason: HOSPADM

## 2023-06-23 RX ORDER — DIMENHYDRINATE 50 MG/ML
25 INJECTION, SOLUTION INTRAMUSCULAR; INTRAVENOUS
Status: DISCONTINUED | OUTPATIENT
Start: 2023-06-23 | End: 2023-06-23 | Stop reason: HOSPADM

## 2023-06-23 RX ORDER — ONDANSETRON 2 MG/ML
4 INJECTION INTRAMUSCULAR; INTRAVENOUS EVERY 30 MIN PRN
Status: DISCONTINUED | OUTPATIENT
Start: 2023-06-23 | End: 2023-06-23 | Stop reason: HOSPADM

## 2023-06-23 RX ORDER — FENTANYL CITRATE 50 UG/ML
INJECTION, SOLUTION INTRAMUSCULAR; INTRAVENOUS PRN
Status: DISCONTINUED | OUTPATIENT
Start: 2023-06-23 | End: 2023-06-23

## 2023-06-23 RX ORDER — MEPERIDINE HYDROCHLORIDE 25 MG/ML
12.5 INJECTION INTRAMUSCULAR; INTRAVENOUS; SUBCUTANEOUS EVERY 5 MIN PRN
Status: DISCONTINUED | OUTPATIENT
Start: 2023-06-23 | End: 2023-06-23 | Stop reason: HOSPADM

## 2023-06-23 RX ORDER — ONDANSETRON 2 MG/ML
INJECTION INTRAMUSCULAR; INTRAVENOUS PRN
Status: DISCONTINUED | OUTPATIENT
Start: 2023-06-23 | End: 2023-06-23

## 2023-06-23 RX ORDER — PROPOFOL 10 MG/ML
INJECTION, EMULSION INTRAVENOUS PRN
Status: DISCONTINUED | OUTPATIENT
Start: 2023-06-23 | End: 2023-06-23

## 2023-06-23 RX ORDER — HYDROXYZINE HYDROCHLORIDE 25 MG/1
25 TABLET, FILM COATED ORAL EVERY 6 HOURS PRN
Status: DISCONTINUED | OUTPATIENT
Start: 2023-06-23 | End: 2023-06-23 | Stop reason: HOSPADM

## 2023-06-23 RX ADMIN — LIDOCAINE HYDROCHLORIDE 50 MG: 20 INJECTION, SOLUTION INFILTRATION; PERINEURAL at 07:29

## 2023-06-23 RX ADMIN — ONDANSETRON 4 MG: 2 INJECTION INTRAMUSCULAR; INTRAVENOUS at 08:06

## 2023-06-23 RX ADMIN — MIDAZOLAM 2 MG: 1 INJECTION INTRAMUSCULAR; INTRAVENOUS at 07:21

## 2023-06-23 RX ADMIN — PROPOFOL 100 MCG/KG/MIN: 10 INJECTION, EMULSION INTRAVENOUS at 07:34

## 2023-06-23 RX ADMIN — OXYCODONE HYDROCHLORIDE AND ACETAMINOPHEN 2 TABLET: 5; 325 TABLET ORAL at 09:46

## 2023-06-23 RX ADMIN — FENTANYL CITRATE 50 MCG: 50 INJECTION, SOLUTION INTRAMUSCULAR; INTRAVENOUS at 07:21

## 2023-06-23 RX ADMIN — Medication 2 G: at 07:21

## 2023-06-23 RX ADMIN — DIMENHYDRINATE 25 MG: 50 INJECTION, SOLUTION INTRAMUSCULAR; INTRAVENOUS at 07:35

## 2023-06-23 RX ADMIN — SODIUM CHLORIDE, POTASSIUM CHLORIDE, SODIUM LACTATE AND CALCIUM CHLORIDE: 600; 310; 30; 20 INJECTION, SOLUTION INTRAVENOUS at 08:34

## 2023-06-23 RX ADMIN — FENTANYL CITRATE 50 MCG: 50 INJECTION INTRAMUSCULAR; INTRAVENOUS at 09:03

## 2023-06-23 RX ADMIN — KETOROLAC TROMETHAMINE 30 MG: 30 INJECTION, SOLUTION INTRAMUSCULAR at 08:11

## 2023-06-23 RX ADMIN — PROPOFOL 200 MG: 10 INJECTION, EMULSION INTRAVENOUS at 07:30

## 2023-06-23 RX ADMIN — FENTANYL CITRATE 50 MCG: 50 INJECTION, SOLUTION INTRAMUSCULAR; INTRAVENOUS at 08:32

## 2023-06-23 RX ADMIN — ENOXAPARIN SODIUM 40 MG: 40 INJECTION SUBCUTANEOUS at 08:13

## 2023-06-23 RX ADMIN — SODIUM CHLORIDE, POTASSIUM CHLORIDE, SODIUM LACTATE AND CALCIUM CHLORIDE: 600; 310; 30; 20 INJECTION, SOLUTION INTRAVENOUS at 07:12

## 2023-06-23 RX ADMIN — ROCURONIUM BROMIDE 50 MG: 50 INJECTION, SOLUTION INTRAVENOUS at 07:30

## 2023-06-23 RX ADMIN — SUGAMMADEX 200 MG: 100 INJECTION, SOLUTION INTRAVENOUS at 08:17

## 2023-06-23 ASSESSMENT — ACTIVITIES OF DAILY LIVING (ADL)
ADLS_ACUITY_SCORE: 35
ADLS_ACUITY_SCORE: 35

## 2023-06-23 ASSESSMENT — LIFESTYLE VARIABLES: TOBACCO_USE: 1

## 2023-06-23 NOTE — OP NOTE
Procedure Date: 06/23/2023    PREOPERATIVE DIAGNOSIS:  Biliary dyskinesia.    POSTOPERATIVE DIAGNOSIS:  Biliary dyskinesia, chronic cholecystitis, 1 x 1 cm umbilical hernia, hemorrhagic ovarian cyst.    PROCEDURES PERFORMED:    1.  Laparoscopic cholecystectomy.  2.  Open umbilical hernia repair with a 1 x 1 cm defect.    SURGEON:  Messi Neil MD, [facility]    ANESTHESIA:  General endotracheal.    INDICATIONS FOR PROCEDURE:  This is a 38-year-old lady who presented with right upper quadrant pain and nausea.  She was seen by me originally for an EGD and then subsequent HIDA scan revealed a low ejection fraction with the injection of CCK reproducing her symptoms and for this reason, we elected to take her to the operating room for a laparoscopic cholecystectomy.    OPERATIVE FINDINGS:  Included a hemorrhagic right ovarian cyst, an umbilical hernia with a 1 x 1 cm defect, and gallbladder with multiple adhesions.    DETAILS OF PROCEDURE:  In the preoperative holding area, the patient also noticed an umbilical hernia and she wanted that fixed at the same time, so it was opted to repair this through a port site, use the hernia as a port site as it was quite small.  She was taken to the operating room.  The abdomen was prepped and draped in sterile fashion.  Timeout was performed confirming the day and the patient as well as the procedure to be performed.  A curvilinear infraumbilical incision was then made.  The hernia sac was opened and a port was inserted through the hernia and the abdomen was insufflated to 15 mmHg pressure.  Generalized inspection of the abdomen was carried out.  There was some blood seen along the right gutter and the right pelvis.  Two right upper quadrant 5 mm trocars were placed.  We then inspected the uterus, inspected the right ovary and there appeared to be a hemorrhagic cyst.  We did get an intraoperative consultation from Dr. Christopher of OB/GYN who confirmed that this was the most likely  source of the blood along the gutter and the hemorrhagic cyst began to involute.  We then turned our attention to the gallbladder.  An 11 mm subxiphoid trocar site, gallbladder was pulled cephalad.  There were multiple adhesions of the gallbladder.  These were taken down with a combination of [place] and ConMed dissector.  After taking down, the base of the gallbladder was grasped.  We then began to dissect on the medial and lateral aspects of the gallbladder and the peritoneum.  During the dissection, the node of Calot and the cystic artery was readily identified.  The cystic artery was cauterized.  Eventually, a large window was created into the base of the gallbladder and clearly identifying the cystic duct.  The cystic duct was clipped and transected and the gallbladder was removed from the abdomen.  The liver was removed from the liver bed using cautery and the abdomen using EndoCatch bag.  The area was copiously irrigated.  All fluid was suctioned out.  We inspected the liver bed.  There was no evidence of any bleeding.  The subxiphoid trocar was removed.  The fascia was closed using a cone PMI needle, #1 PDS.  The two 5 mm trocars were removed without evidence of any bleeding.  The umbilical trocar was removed.  The fascia was then closed using a figure-of-eight #1 PDS.  The umbilicus was then rebuilt using 3-0 Vicryl.  All skin incisions were closed using 3-0 Vicryl and Dermabond glue.  Sterile dressings were applied.  The patient was taken from the operating room to the recovery room in stable condition to be sent home.    Messi Neil MD, FACS        D: 2023   T: 2023   MT: gali    Name:     MARY LOU BALDERAS  MRN:      [id number]        Account:        [id number]   :      1985           Procedure Date: 2023     Document: [id number]

## 2023-06-23 NOTE — ANESTHESIA PREPROCEDURE EVALUATION
Anesthesia Pre-Procedure Evaluation    Patient: Yvette Weiss   MRN: [id number] : 1985        Procedure : Procedure(s):  CHOLECYSTECTOMY, LAPAROSCOPIC          Past Medical History:   Diagnosis Date     Anxiety     as teenager     ASCUS on Pap smear     neg for High Risk HPV     Chickenpox     x2     Depression     as teenager     Gestational diabetes 2016     Lateral epicondylitis 2004     Postpartum depression 2014    mild      Past Surgical History:   Procedure Laterality Date     ESOPHAGOSCOPY, GASTROSCOPY, DUODENOSCOPY (EGD), COMBINED N/A 2023    Procedure: Esophagoscopy, gastroscopy, duodenoscopy (EGD), combined with biopsies;  Surgeon: Messi Neil MD;  Location:  GI     ORTHOPEDIC SURGERY      ORIF left wrist      Allergies   Allergen Reactions     Latex Hives      Social History     Tobacco Use     Smoking status: Former     Packs/day: 1.00     Years: 10.00     Pack years: 10.00     Types: Cigarettes     Quit date: 2010     Years since quittin.4     Smokeless tobacco: Never   Substance Use Topics     Alcohol use: Yes     Comment: occ      Wt Readings from Last 1 Encounters:   23 60.3 kg (133 lb)        Anesthesia Evaluation   Pt has had prior anesthetic. Type: Regional and General.    No history of anesthetic complications       ROS/MED HX  ENT/Pulmonary: Comment: Quit Smokin/20/10 - neg pulmonary ROS   (+) tobacco use, Past use,     Neurologic:  - neg neurologic ROS     Cardiovascular:  - neg cardiovascular ROS   (+) -----Previous cardiac testing   Echo: Date: Results:    Stress Test: Date: Results:    ECG Reviewed: Date: 3-16-23 Results:  Rima Burgos    [facility]  Cath: Date: Results:      METS/Exercise Tolerance: >4 METS    Hematologic: Comments: On xeralto, had PE in April, resolved now.  Will get lovenox today    (+) History of blood clots, pt is anticoagulated,     Musculoskeletal:  - neg musculoskeletal ROS     GI/Hepatic:  -  neg GI/hepatic ROS   (+) cholecystitis/cholelithiasis,  (-) GERD   Renal/Genitourinary:  - neg Renal ROS     Endo:  - neg endo ROS     Psychiatric/Substance Use:  - neg psychiatric ROS   (+) psychiatric history anxiety and depression     Infectious Disease:  - neg infectious disease ROS     Malignancy:  - neg malignancy ROS     Other:  - neg other ROS          Physical Exam    Airway  airway exam normal      Mallampati: II   TM distance: > 3 FB   Neck ROM: full   Mouth opening: > 3 cm    Respiratory Devices and Support         Dental       (+) Minor Abnormalities - some fillings, tiny chips      Cardiovascular   cardiovascular exam normal       Rhythm and rate: regular and normal     Pulmonary   pulmonary exam normal        breath sounds clear to auscultation           OUTSIDE LABS:  CBC:   Lab Results   Component Value Date    WBC 5.8 03/16/2023    WBC 5.5 12/08/2022    HGB 12.2 03/16/2023    HGB 13.2 12/08/2022    HCT 37.4 03/16/2023    HCT 40.3 12/08/2022     03/16/2023     12/08/2022     BMP:   Lab Results   Component Value Date     03/16/2023     12/08/2022    POTASSIUM 4.1 03/16/2023    POTASSIUM 4.1 12/08/2022    CHLORIDE 104 03/16/2023    CHLORIDE 102 12/08/2022    CO2 24 03/16/2023    CO2 30 (H) 12/08/2022    BUN 10.2 03/16/2023    BUN 15.9 12/08/2022    CR 0.76 03/16/2023    CR 0.78 12/08/2022    GLC 94 03/16/2023     (H) 12/08/2022     COAGS: No results found for: PTT, INR, FIBR  POC:   Lab Results   Component Value Date     (H) 01/19/2016    HCG Negative 02/09/2012     HEPATIC:   Lab Results   Component Value Date    ALBUMIN 4.7 12/08/2022    PROTTOTAL 7.7 12/08/2022    ALT 19 12/08/2022    AST 23 12/08/2022    ALKPHOS 68 12/08/2022    BILITOTAL 0.3 12/08/2022     OTHER:   Lab Results   Component Value Date    JACOB 8.8 03/16/2023    LIPASE 34 12/08/2022    TSH 1.44 08/01/2011    T4 1.44 08/01/2011       Anesthesia Plan    ASA Status:  1   NPO Status:  NPO  Appropriate    Anesthesia Type: General.     - Airway: ETT   Induction: Intravenous, Propofol.   Maintenance: Balanced.        Consents    Anesthesia Plan(s) and associated risks, benefits, and realistic alternatives discussed. Questions answered and patient/representative(s) expressed understanding.    - Discussed:     - Discussed with:  Patient      - Extended Intubation/Ventilatory Support Discussed: No.      - Patient is DNR/DNI Status: No    Use of blood products discussed: Yes.     - Discussed with: Patient.     - Consented: consented to blood products            Reason for refusal: other.     Postoperative Care    Pain management: IV analgesics, Oral pain medications.     - Plan for long acting post-op opioid use   PONV prophylaxis: Ondansetron (or other 5HT-3), Background Propofol Infusion, Meclizine or Dimenhydrinate     Comments:    Other Comments: The risks and benefits of anesthesia, and the alternatives where applicable, have been discussed with the patient, and they wish to proceed.            JUNE Barrera CRNA

## 2023-06-23 NOTE — BRIEF OP NOTE
[facility]    Brief Operative Note    Pre-operative diagnosis: Biliary dyskinesia [K82.8]  Post-operative diagnosis Same as pre-operative diagnosis, chronic cholecystitis, 1x1 cm umbilical hernia, hemorrhagic ovarian cyst    Procedure: Procedure(s):  CHOLECYSTECTOMY, LAPAROSCOPIC  Herniorrhaphy umbilical  Surgeon: Surgeon(s) and Role:     * Messi Neil MD - Primary  Anesthesia: General   Estimated Blood Loss: Less than 10 ml    Drains: None  Specimens:   ID Type Source Tests Collected by Time Destination   1 : Gallbladder and contents Tissue Gallbladder SURGICAL PATHOLOGY EXAM Messi Neil MD 6/23/2023  7:53 AM      Findings:   Hemorraghic right ovarian cyst,  Umbilical hernia with 1x1 cm defect, Gallbladder with adhesions.  Complications: None.  Implants: * No implants in log *      Messi Neil MD, FACS      #[id number]

## 2023-06-23 NOTE — ANESTHESIA CARE TRANSFER NOTE
Patient: Yvette Weiss    Procedure: Procedure(s):  CHOLECYSTECTOMY, LAPAROSCOPIC  Herniorrhaphy umbilical       Diagnosis: Biliary dyskinesia [K82.8]  Diagnosis Additional Information: No value filed.    Anesthesia Type:   General     Note:    Oropharynx: oropharynx clear of all foreign objects and spontaneously breathing  Level of Consciousness: drowsy  Oxygen Supplementation: face mask    Independent Airway: airway patency satisfactory and stable  Dentition: dentition unchanged  Vital Signs Stable: post-procedure vital signs reviewed and stable  Report to RN Given: handoff report given  Patient transferred to: PACU    Handoff Report: Identifed the Patient, Identified the Reponsible Provider, Reviewed the pertinent medical history, Discussed the surgical course, Reviewed Intra-OP anesthesia mangement and issues during anesthesia, Set expectations for post-procedure period and Allowed opportunity for questions and acknowledgement of understanding      Vitals:  Vitals Value Taken Time   /70 06/23/23 0835   Temp     Pulse 84 06/23/23 0836   Resp 19 06/23/23 0836   SpO2 99 % 06/23/23 0836   Vitals shown include unvalidated device data.    Electronically Signed By: JUNE Barrera CRNA  June 23, 2023  8:38 AM

## 2023-06-23 NOTE — ANESTHESIA POSTPROCEDURE EVALUATION
Patient: Yvette Weiss    Procedure: Procedure(s):  CHOLECYSTECTOMY, LAPAROSCOPIC  Herniorrhaphy umbilical       Anesthesia Type:  General    Note:  Disposition: Outpatient   Postop Pain Control: Uneventful            Sign Out: Well controlled pain   PONV: No   Neuro/Psych: Uneventful            Sign Out: Acceptable/Baseline neuro status   Airway/Respiratory: Uneventful            Sign Out: Acceptable/Baseline resp. status   CV/Hemodynamics: Uneventful            Sign Out: Acceptable CV status   Other NRE: NONE   DID A NON-ROUTINE EVENT OCCUR? No    Event details/Postop Comments:  Pt was happy with anesthesia care.  No complications.  I will follow up with the pt if needed.           Last vitals:  Vitals Value Taken Time   /65 06/23/23 0920   Temp 98.4  F (36.9  C) 06/23/23 0920   Pulse 75 06/23/23 0920   Resp 18 06/23/23 0920   SpO2 97 % 06/23/23 0920   Vitals shown include unvalidated device data.    Electronically Signed By: JUNE Barrera CRNA  June 23, 2023  12:06 PM

## 2023-06-23 NOTE — INTERVAL H&P NOTE
I have reviewed the surgical (or preoperative) H&P that is linked to this encounter, and examined the patient. There are no significant changes    Clinical Conditions Present on Arrival:  Clinically Significant Risk Factors Present on Admission                # Drug Induced Coagulation Defect: home medication list includes an anticoagulant medication        
Never smoker

## 2023-06-23 NOTE — ANESTHESIA PROCEDURE NOTES
Airway       Patient location during procedure: OR       Procedure Start/Stop Times: 6/23/2023 7:32 AM  Staff -        CRNA: Hank Longo APRN CRNA       Performed By: CRNA  Consent for Airway        Urgency: elective  Indications and Patient Condition       Indications for airway management: harvey-procedural       Induction type:intravenous       Mask difficulty assessment: 1 - vent by mask    Final Airway Details       Final airway type: endotracheal airway       Successful airway: ETT - single  Endotracheal Airway Details        ETT size (mm): 6.5       Cuffed: yes       Successful intubation technique: video laryngoscopy       VL Blade Size: [place] 3       Grade View of Cords: 1       Adjucts: stylet       Position: Right       Measured from: lips       Secured at (cm): 21       Bite block used: Oral Airway    Post intubation assessment        Placement verified by: capnometry, equal breath sounds and chest rise        Number of attempts at approach: 1       Number of other approaches attempted: 0       Secured with: plastic tape       Ease of procedure: easy       Dentition: Intact and Unchanged    Medication(s) Administered   Medication Administration Time: 6/23/2023 7:32 AM

## 2023-06-23 NOTE — DISCHARGE INSTRUCTIONS
Follow-up Care:    [facility]    Home Care Following Gallbladder Surgery    Dr. Neil        Care of the Incision:  Surgical glue was used on your incision, keep it dry for 24 hours.  Then you may shower but don t submerge under water for at least 2 weeks.  Gently pat your incision dry with a freshly laundered towel.  Do not touch your incision with bare hands or pick at scabs.  Leave your incision open to air.  Cover it only if draining, clothing rubs or irritates it.    Activity:  Gradually increase your activity.  Walk short distances several times each day and increase the distance as your strength allows.  To promote circulation, do not cross your legs while sitting.  No strenuous lifting or straining for 2-3 weeks.  Do not lift anything over 10-20 pounds until your doctor approves an increase.  Return to work will be determined by the type of work you do and should be discussed with your physician.  Do not drive or operate equipment while taking prescription pain medicines.  You may drive 1 week after surgery if you have stopped taking prescription pain medicines and can react quickly enough to make an emergency stop if necessary.    Diet:  Continue a low fat diet for 1 week then resume usual diet as tolerated.  Drink plenty of water.  Avoid foods that cause constipation.    REMEMBER--most prescription pain pills cause constipation.  Walking, extra fluids, and increased fiber (fresh fruits and vegetables, etc.) are natural remedies for constipation.  You can also take mineral oil, 1-2 Tablespoons per day.  If still constipated you may try a stool softener such as Colace or Miralax.    Call Your Physician if You Have:  Redness, increased swelling or cloudy drainage from your incision.  A temperature of more than 101 degrees F.  Worsening pain in your incision not relieved by your prescription pain pills and/or a short rest.  Jaundice (yellowing of skin or eyes)  Abdominal distention (stomach  getting very large)  Swelling in your legs  Productive cough  Burning with urination  Any questions or concerns about your recovery, please call Business hours [phone]    After hours [phone] Nurse Advice Line (24 hours a day)    Follow-up Care:  Make an appointment 2-3 week after your surgery, if not already made.  Call [phone].     [facility]   Adult Discharge Orders & Instructions     For 24 hours after surgery    Get plenty of rest.  A responsible adult must stay with you for at least 24 hours after you leave the hospital.   Do not drive or use heavy equipment.  If you have weakness or tingling, don't drive or use heavy equipment until this feeling goes away.  Do not drink alcohol.  Avoid strenuous or risky activities.  Ask for help when climbing stairs.   You may feel lightheaded.  If so, sit for a few minutes before standing.  Have someone help you get up.   You may have a slight fever. Call the doctor if your fever is over 100 F (37.7 C) (taken under the tongue) or lasts longer than 24 hours.  You may have a dry mouth, a sore throat, muscle aches or trouble sleeping.  These should go away after 24 hours.  Do not make important or legal decisions.  We don t expect you to have any problems from the surgery or treatment you had today. Just in case, here s what to do if you have pain, upset stomach (nausea), bleeding or infection:  Pain:  Take medicines your doctor has prescribed or over-the-counter medicine they have suggested. Resting and using ice packs can help, too. For surgery on an arm or leg, raise it on a pillow to ease swelling. Call your doctor if these methods don t work.  Copyright [employer], Licensed under [employer]  Upset stomach (nausea):  Take anti-nausea medicine approved by your doctor. Drink clear liquids like apple juice, ginger ale, broth or 7-Up. Be sure to drink enough fluids. Rest can help, too. Move to normal foods  when you re ready.   Bleeding:  In the first 24 hours, you may see a little blood on your dressing, about the size of a quarter. You don t need to worry about this much blood, but if the blood spot keeps getting bigger:  Put pressure on the wound if you can, AND  Call your doctor.  Copyright [employer], Licensed under [employer]  Fever/Infection: Please call your doctor if you have any of these signs:  Redness  Swelling  Wound feels warm  Pain gets worse  Bad-smelling fluid leaks from wound  Fever or chills  Call your doctor for any of the followin.  It has been over 8 to 10 hours since surgery and you are still not able to urinate (pass water).

## 2023-07-03 ENCOUNTER — OFFICE VISIT (OUTPATIENT)
Dept: SURGERY | Facility: CLINIC | Age: 38
End: 2023-07-03
Payer: COMMERCIAL

## 2023-07-03 VITALS
BODY MASS INDEX: 23.94 KG/M2 | DIASTOLIC BLOOD PRESSURE: 72 MMHG | WEIGHT: 133 LBS | TEMPERATURE: 97.7 F | SYSTOLIC BLOOD PRESSURE: 116 MMHG

## 2023-07-03 DIAGNOSIS — Z98.890 POST-OPERATIVE STATE: Primary | ICD-10-CM

## 2023-07-03 PROCEDURE — 99024 POSTOP FOLLOW-UP VISIT: CPT | Performed by: SPECIALIST

## 2023-07-03 ASSESSMENT — PAIN SCALES - GENERAL: PAINLEVEL: MILD PAIN (3)

## 2023-07-03 NOTE — LETTER
7/3/2023         RE: Yvette Weiss  [address] [facility] [address]        Dear Colleague,    Thank you for referring your patient, Yvette Weiss, to the [facility]. Please see a copy of my visit note below.    F/U for lap yohana.    Subjective:  Pt feels good.  Pre-op sx have resolved.    Objective:  B/P: 116/72, T: 97.7, P: Data Unavailable, R: Data Unavailable  Abd: Soft, non tender, non distended,    Gallbladder: Laparoscopic cholecystectomy:  - Mild chronic cholecystitis    Assessment/Plan:  Pt s/p lap yohana. Doing well.  Pre-op sx have resolved.  Patient will increase their activity as fatty food intake as tolerated.  F/U with me PRN.    Messi Neil MD, FACS        Again, thank you for allowing me to participate in the care of your patient.        Sincerely,        Messi Neil MD

## 2023-07-03 NOTE — PROGRESS NOTES
F/U for lap yohana.    Subjective:  Pt feels good.  Pre-op sx have resolved.    Objective:  B/P: 116/72, T: 97.7, P: Data Unavailable, R: Data Unavailable  Abd: Soft, non tender, non distended,    Gallbladder: Laparoscopic cholecystectomy:  - Mild chronic cholecystitis    Assessment/Plan:  Pt s/p lap yohana. Doing well.  Pre-op sx have resolved.  Patient will increase their activity as fatty food intake as tolerated.  F/U with me PRN.    Messi Neil MD, FACS

## 2023-07-26 ENCOUNTER — HOSPITAL ENCOUNTER (OUTPATIENT)
Dept: CT IMAGING | Facility: CLINIC | Age: 38
Discharge: HOME | End: 2023-07-26
Attending: STUDENT IN AN ORGANIZED HEALTH CARE EDUCATION/TRAINING PROGRAM | Admitting: STUDENT IN AN ORGANIZED HEALTH CARE EDUCATION/TRAINING PROGRAM
Payer: COMMERCIAL

## 2023-07-26 DIAGNOSIS — I27.82 CHRONIC PULMONARY EMBOLISM WITHOUT ACUTE COR PULMONALE, UNSPECIFIED PULMONARY EMBOLISM TYPE (H): ICD-10-CM

## 2023-07-26 PROCEDURE — 71275 CT ANGIOGRAPHY CHEST: CPT

## 2023-07-26 PROCEDURE — 250N000011 HC RX IP 250 OP 636: Performed by: STUDENT IN AN ORGANIZED HEALTH CARE EDUCATION/TRAINING PROGRAM

## 2023-07-26 PROCEDURE — 250N000009 HC RX 250: Performed by: STUDENT IN AN ORGANIZED HEALTH CARE EDUCATION/TRAINING PROGRAM

## 2023-07-26 RX ORDER — IOPAMIDOL 755 MG/ML
500 INJECTION, SOLUTION INTRAVASCULAR ONCE
Status: COMPLETED | OUTPATIENT
Start: 2023-07-26 | End: 2023-07-26

## 2023-07-26 RX ADMIN — SODIUM CHLORIDE 70 ML: 9 INJECTION, SOLUTION INTRAVENOUS at 08:12

## 2023-07-26 RX ADMIN — IOPAMIDOL 70 ML: 755 INJECTION, SOLUTION INTRAVENOUS at 08:12

## 2023-09-11 ENCOUNTER — OFFICE VISIT (OUTPATIENT)
Dept: SURGERY | Facility: CLINIC | Age: 38
End: 2023-09-11
Payer: COMMERCIAL

## 2023-09-11 VITALS
WEIGHT: 123 LBS | SYSTOLIC BLOOD PRESSURE: 130 MMHG | BODY MASS INDEX: 21.79 KG/M2 | HEIGHT: 63 IN | DIASTOLIC BLOOD PRESSURE: 84 MMHG | TEMPERATURE: 98.3 F

## 2023-09-11 DIAGNOSIS — Z98.890 POST-OPERATIVE STATE: Primary | ICD-10-CM

## 2023-09-11 PROCEDURE — 99024 POSTOP FOLLOW-UP VISIT: CPT | Performed by: SPECIALIST

## 2023-09-11 ASSESSMENT — PAIN SCALES - GENERAL: PAINLEVEL: MILD PAIN (2)

## 2023-09-11 NOTE — PROGRESS NOTES
Follow-up for laparoscopic cholecystectomy.    Subjective:  Patient is 2 and half month status post laparoscopic cholecystectomy.  Her preop symptoms remain resolved.  However she noticed something poking under the skin near her umbilicus and became concerned about it.  Denies any nausea vomiting fevers chills.    Objective:  B/P: 130/84, T: 98.3, P: Data Unavailable, R: Data Unavailable  Abdomen: Soft, nontender, nondistended, supraumbilical incision healed well but PDS suture is palpable underneath patient.  Patient has a paucity of body fat.      Assessment/plan:  This is a 38-year-old lady status post a laparoscopic cholecystectomy.  She has very low body fat and on exam her PDS suture is palpable.  This corresponds to the area of her complaint.  I did discuss this with the patient and she expressed understanding.  I also explained explained that the PDS does dissolve over 6 to 9 months.  I gave her the option of excising the suture under local anesthesia versus observation.  The patient wishes to pursue observation at this time.  She knows to follow-up with me should things change.    Messi Neil MD, FACS

## 2023-09-11 NOTE — LETTER
9/11/2023         RE: Yvette Weiss  [address] [facility] [address]        Dear Colleague,    Thank you for referring your patient, Yvette Weiss, to the [facility]. Please see a copy of my visit note below.    Follow-up for laparoscopic cholecystectomy.    Subjective:  Patient is 2 and half month status post laparoscopic cholecystectomy.  Her preop symptoms remain resolved.  However she noticed something poking under the skin near her umbilicus and became concerned about it.  Denies any nausea vomiting fevers chills.    Objective:  B/P: 130/84, T: 98.3, P: Data Unavailable, R: Data Unavailable  Abdomen: Soft, nontender, nondistended, supraumbilical incision healed well but PDS suture is palpable underneath patient.  Patient has a paucity of body fat.      Assessment/plan:  This is a 38-year-old lady status post a laparoscopic cholecystectomy.  She has very low body fat and on exam her PDS suture is palpable.  This corresponds to the area of her complaint.  I did discuss this with the patient and she expressed understanding.  I also explained explained that the PDS does dissolve over 6 to 9 months.  I gave her the option of excising the suture under local anesthesia versus observation.  The patient wishes to pursue observation at this time.  She knows to follow-up with me should things change.    Messi Neil MD, FACS      Again, thank you for allowing me to participate in the care of your patient.        Sincerely,        Messi Neil MD

## 2023-10-04 ENCOUNTER — MYC MEDICAL ADVICE (OUTPATIENT)
Dept: GASTROENTEROLOGY | Facility: CLINIC | Age: 38
End: 2023-10-04
Payer: COMMERCIAL

## 2023-10-13 NOTE — PROGRESS NOTES
Gastroenterology CLINIC VISIT, RETURN PATIENT    CC/REFERRING PROVIDER: Mana Rock  REASON FOR CONSULTATION: follow up on abdominal pain    HPI: 38 year old female presented to GI clinic for follow up on upper abdominal pain secondary to gallbladder dyskinesia. HIDA was suggestive for dyskinesia of gallbladder with EF of 31%. Upper GI endoscopy was unremarkable. Patient underwent laparoscopic cholecystectomy with Dr. Neil.  Reported resolution of her upper abdominal pain.  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  Patient completed treatment with Xarelto.  Right lung PE, which thought to be related to estrogen pill.   History of dyspareunia. Did not make a follow up appointment with GYN yet.  Patient complains of lower abdominal discomfort, not related to meal intake.  Said that when she sits down she experiences a shooting pain from her rectum up to her intestine.  She complained of alternation of diarrhea and constipation, felt it was due to IBS  and dyskinesia of gallbladder.  Patient stated that she increase dietary fiber and also taking a fiber supplement.  She has been having bowel movement every day, with intermittent delay in bowel elimination for 2 to 3 days.  She denies any significant straining.  Reported intermittent BRBPR; stated that she has hemorrhoids.  Has been taking over-the-counter creams and suppository for hemorrhoids.  Takes Tylenol or ibuprofen as needed for rectal pain.  Patient stated that she changed her diet significantly by eliminating dairy products, high fat foods, caffeine, and alcohol from her diet.  She is concerned that she lost approximately 10 pounds and asking if it is related to her gallbladder surgery.  Explanation provided.  No new medications.  No new OTC supplements.  She denies any red flag symptoms.    PREVIOUS ENDOSCOPY:  5/3/2023 EGD  Findings:        The Z-line was regular and was found 36 cm from the  incisors.        The entire examined stomach was normal. Biopsies were taken with a cold        forceps for Helicobacter pylori testing.        The examined duodenum was normal. Biopsies for histology were taken with        a cold forceps for evaluation of celiac disease.      Final Diagnosis   A(1).  Duodenum, biopsy:  -Small intestinal mucosa with inactive chronic peptic duodenitis.  -Normal villous architecture identified and no prominence in intraepithelial lymphocytes seen.  -Negative for luminal organisms.  -Negative for dysplasia or malignancy.      B(2). Stomach, antrum, biopsy  - Oxyntic type gastric mucosa with mild chronic inflammation.  - Negative for H. Pylori organisms on routine stains.  - Negative for intestinal metaplasia.   -Negative for dysplasia or malignancy     PERTINENT STUDIES Reviewed in EMR  5/24/23 HIDA  Impression:  Gallbladder ejection fraction is borderline decreased, these findings  can be seen with mild biliary dyskinesia.     5/24/23 Abdominal ultrasound  IMPRESSION:  1.  No sonographic findings to explain pain. Specifically no findings  to suggest acute cholecystitis.  2.  Incidental low risk gallbladder polyps measuring up to 4 mm. No  follow-up is recommended for low risk polyps 6 mm or smaller.     ROS: 10pt ROS performed and otherwise negative.    PAST MEDICAL HISTORY:  Past Medical History:   Diagnosis Date    Anxiety     as teenager    ASCUS on Pap smear 2011    neg for High Risk HPV    Chickenpox     x2    Depression     as teenager    Gestational diabetes 2016    Lateral epicondylitis 09/14/2004    Postpartum depression 04/2014    mild       PREVIOUS ABDOMINAL/GYNECOLOGIC SURGERIES:    Past Surgical History:   Procedure Laterality Date    ESOPHAGOSCOPY, GASTROSCOPY, DUODENOSCOPY (EGD), COMBINED N/A 5/2/2023    Procedure: Esophagoscopy, gastroscopy, duodenoscopy (EGD), combined with biopsies;  Surgeon: Messi Neil MD;  Location: [facility]    HERNIORRHAPHY UMBILICAL N/A  2023    Procedure: Herniorrhaphy umbilical;  Surgeon: Messi Neil MD;  Location: [facility]    LAPAROSCOPIC CHOLECYSTECTOMY N/A 2023    Procedure: CHOLECYSTECTOMY, LAPAROSCOPIC;  Surgeon: Messi Neil MD;  Location: [facility]    ORTHOPEDIC SURGERY      ORIF left wrist         PERTINENT MEDICATIONS:  Current Outpatient Medications   Medication Sig Dispense Refill    dicyclomine (BENTYL) 10 MG capsule Take 1 capsule (10 mg) by mouth 2 times daily as needed 60 capsule 0         SOCIAL HISTORY:  Social History     Socioeconomic History    Marital status:      Spouse name: Wesly    Number of children: 1    Years of education: Not on file    Highest education level: Not on file   Occupational History    Not on file   Tobacco Use    Smoking status: Former     Packs/day: 1.00     Years: 10.00     Additional pack years: 0.00     Total pack years: 10.00     Types: Cigarettes     Quit date: 2010     Years since quittin.7    Smokeless tobacco: Never   Vaping Use    Vaping Use: Never used   Substance and Sexual Activity    Alcohol use: Yes     Comment: occ    Drug use: No    Sexual activity: Yes     Partners: Male     Birth control/protection: Condom   Other Topics Concern    Parent/sibling w/ CABG, MI or angioplasty before 65F 55M? No   Social History Narrative    Not on file     Social Determinants of Health     Financial Resource Strain: Not on file   Food Insecurity: Not on file   Transportation Needs: Not on file   Physical Activity: Not on file   Stress: Not on file   Social Connections: Not on file   Interpersonal Safety: Not on file   Housing Stability: Not on file       FAMILY HISTORY:  Denies colon/panc/esophageal/other GI CA, no other Rodriguez or other HPS-related Juvencio. No IBD/celiac, no other AI/liver/thyroid disease.    Family History   Adopted: Yes   Problem Relation Age of Onset    Alcohol/Drug Mother         A&D    Alcohol/Drug Father         A&D    Parkinsonism Paternal Grandmother   "   Cerebrovascular Disease Maternal Grandmother         x3    Other - See Comments Sister         fetal alcohol syndrome    Cancer Maternal Grandfather         lung    Other Cancer Paternal Grandfather         Lung       PHYSICAL EXAMINATION:  Vitals reviewed  /62   Pulse 92   Temp 97.7  F (36.5  C) (Temporal)   Ht 1.588 m (5' 2.5\")   Wt 55.7 kg (122 lb 14.4 oz)   SpO2 100%   BMI 22.12 kg/m      General: Patient appears well in no acute distress.    Skin: No visualized rash or lesions on visualized skin  HEENT:    EOMI, no erythema, sclera icterus or discharge noted.  Mouth mucosa intact, pink, moist  No cervical or supraclavicular lymphadenopathy. Thyroid gland not enlarged.  Resp: breathing comfortably without accessory muscle usage, speaking in full sentences, no cough.   Card:  No LE edema.  Abdomen: Active bowel sounds X 4 quadrants. Soft to palpation.  Tenderness in the left lower quadrant, mild.  No organomegaly.  Well-healed surgical incisions.  No guarding or rebound tenderness. Enciso's sign negative.  MSK: Appears to have normal range of motion based on visualized movements  Neurologic: No apparent tremors, facial movements symmetric  Psych: affect normal, alert and oriented      ASSESSMENT/PLAN:    ICD-10-CM    1. Dyskinesia of gallbladder  K82.8       2. LLQ abdominal pain  R10.32 Colonoscopy Screening  Referral     Adult GI Clinic Follow-Up Order (Blank)     dicyclomine (BENTYL) 10 MG capsule      3. Hematochezia  K92.1 Colonoscopy Screening  Referral     Adult GI Clinic Follow-Up Order (Blank)      4. Alternating constipation and diarrhea  R19.8 dicyclomine (BENTYL) 10 MG capsule      5. External hemorrhoids  K64.4          38 year old female  presented  to GI clinic for for a follow-up.  I initially saw the patient for complaints of upper abdominal discomfort, worse with deep breathing and cough.  She was referred for CT scan and found to have a right lung PEs and was " placed on  Xeralto.  Follow-up CT scan of chest was negative for recurrent or chronic PEs, but there were 4 mm noncalcified lung nodules.  Suggested to follow-up with primary care provider or pulmonology.    Upper GI endoscopy was unremarkable. Due to continuous complaints of abdominal pain, she patient was referred to HIDA scan, which was suggestive for gallbladder dyskinesia with ejection fraction of 31%.  Patient underwent laparoscopic cholecystectomy with Dr. Neil in June.  Today, she reported some lower abdominal pain.  Her appetite had improved, but she is still on some dietary restriction to prevent possible exacerbation of pain.  She complains of continuous alternation of diarrhea and constipation.  Admits to persistent BRBPR after defecation.  Patient believes she has hemorrhoids, has been taking suppository and cream intermittently with mild relief in her rectal discomfort.  Continue complaining of abdominal bloating.  Said that she lost approximately 10 pounds over the past 2 months, which is likely due to dietary changes.  Differential diagnosis were discussed with the patient.  Recommended the following:  - Proceed with upper lower GI endoscopy to exclude IBD, anal fissure, or rectal ulcers.  Assessment of hemorrhoids will be performed during the procedure, deferred by patient today.  - Continue over-the-counter antihemorrhoidal preparations.  - Take dicyclomine as needed for abdominal discomfort or cramping.  - Continue lactose-free diet.  - Avoid NSAIDs.    Patient verbalized understanding and appreciation of care provided. Stated that all of the questions were answered to her/his satisfaction.  RTC in 3-4 months    Thank you for this consultation. It was a pleasure to participate in the care of this patient; please contact us with any further questions.    JUNE Cloud, FNP-C  [facility]  Gastroenterology Department  [place], [place]    This note was created with Dragon voice recognition  software, and while reviewed for accuracy, inadvertent minor typographic errors may occur. Please contact the provider if you have any questions.

## 2023-10-16 ENCOUNTER — TELEPHONE (OUTPATIENT)
Dept: GASTROENTEROLOGY | Facility: CLINIC | Age: 38
End: 2023-10-16

## 2023-10-16 ENCOUNTER — OFFICE VISIT (OUTPATIENT)
Dept: GASTROENTEROLOGY | Facility: CLINIC | Age: 38
End: 2023-10-16
Attending: NURSE PRACTITIONER
Payer: COMMERCIAL

## 2023-10-16 VITALS
SYSTOLIC BLOOD PRESSURE: 102 MMHG | OXYGEN SATURATION: 100 % | HEIGHT: 63 IN | HEART RATE: 92 BPM | BODY MASS INDEX: 21.78 KG/M2 | TEMPERATURE: 97.7 F | WEIGHT: 122.9 LBS | DIASTOLIC BLOOD PRESSURE: 62 MMHG

## 2023-10-16 DIAGNOSIS — K64.4 EXTERNAL HEMORRHOIDS: ICD-10-CM

## 2023-10-16 DIAGNOSIS — K92.1 HEMATOCHEZIA: ICD-10-CM

## 2023-10-16 DIAGNOSIS — K82.8 DYSKINESIA OF GALLBLADDER: Primary | ICD-10-CM

## 2023-10-16 DIAGNOSIS — R10.32 LLQ ABDOMINAL PAIN: ICD-10-CM

## 2023-10-16 DIAGNOSIS — R19.8 ALTERNATING CONSTIPATION AND DIARRHEA: ICD-10-CM

## 2023-10-16 PROCEDURE — 99214 OFFICE O/P EST MOD 30 MIN: CPT | Performed by: NURSE PRACTITIONER

## 2023-10-16 RX ORDER — DICYCLOMINE HYDROCHLORIDE 10 MG/1
10 CAPSULE ORAL 2 TIMES DAILY PRN
Qty: 60 CAPSULE | Refills: 0 | Status: SHIPPED | OUTPATIENT
Start: 2023-10-16 | End: 2023-10-30

## 2023-10-16 ASSESSMENT — PAIN SCALES - GENERAL: PAINLEVEL: MILD PAIN (2)

## 2023-10-16 NOTE — PATIENT INSTRUCTIONS
It was a pleasure taking care of you today.  I've included a brief summary of our discussion and care plan from today's visit below.  Please review this information with your primary care provider.  ______________________________________________________________________    My recommendations are summarized as follows:    I placed an order for screening colonoscopy.  Our endoscopy department will contact you to schedule an appointment.  Please plan to take a day off for the procedure.    2.  Continue dicyclomine as needed for abdominal cramping and pain.    3.  If your rectal bleeding and rectal pain persist, will refer to colorectal surgeon for hemorrhoid surgery.    4.  Continue to use over-the-counter suppository/creams for hemorrhoids.  Increase fiber intake.    Return to GI Clinic in 3-4 months to review your progress.    ______________________________________________________________________    Constipation refers to a change in bowel habits, but it has varied meanings. Stools may be too hard or too small, difficult to pass, or infrequent (less than three times per week). People with constipation may also notice a frequent need to strain and a sense that the bowels are not empty.  Constipation is a very common problem. Each year more than 2.5 million Americans visit their healthcare provider for relief from this problem. Many factors can contribute to or cause constipation, although in most people, no single cause can be found. In general, constipation occurs more frequently as you get older.     Treatment for constipation includes changing some behaviors, eating foods high in fiber, and using medications if needed.  Behavior changes -- The bowels are most active following meals, and this is often the time when stools will pass most readily. If you ignore your body's signals to have a bowel movement, the signals become weaker and weaker over time.By paying close attention to these signals, you may have an easier  time moving your bowels. Drinking a caffeine-containing beverage in the morning may also be helpful.  Increase fiber -- Increasing fiber in your diet may reduce or eliminate constipation. The recommended amount of dietary fiber is 20 to 35 grams of fiber per day. By reading the product information panel on the side of the package, you can determine the number of grams of fiber per serving .Many fruits and vegetables can be particularly helpful in preventing and treating constipation .This is especially true of citrus fruits, prunes, and prune juice. Some breakfast cereals are also an excellent source of dietary fiber.  Start your day off with a high-fiber breakfast such as whole grain oatmeal sprinkled with  raisins, blueberries, pecans or macadamia nuts.   Slice up raw veggies (carrots, celery, bell peppers) and fruit (apples, pears) and keep  them in baggies for quick high-fiber snacks.   Munch on a small handful of nuts, such as peanuts, walnuts, and almonds.   Look for individually wrapped prunes in the grocery store for a quick fiber snack.   When buying frozen veggies, look for ones that have been  flash frozen.    Add half a cup of garbanzo or black beans or peas to your salad to add fiber, texture, and flavor.   EXAMPLES OF HIGH-FIBER FOODS   1 large apple or pear (5g)   1 cup Raisin Bran (5g)     cup raspberries (9g)   1 cup cooked broccoli (5g)   23 almonds (3.5g)   1 cup black beans (15g)   2 brazil nuts (2.5g)   1 cup peas (16g)    Anti-constipation fruit paste:  Ingredients  1 cup pitted prunes  1 cup raisins  1 cup pitted dates  1/2 cup orange juice  2/3 cup prune juice  Tip:  For even more fiber add 1 cup of natural wheat bran to the fruit mixture.  Directions (Makes 25 servings)  Combine all ingredients in a bowl and soak overnight in the refrigerator. Blend in  or  until smooth. Keep in the refrigerator (can be kept frozen in small containers).   Serving size: 2 tablespoons.  Spread it on toast or eat from a spoon.  Caution: May cause bloating and abdominal cramping. Start with smaller portion and increase it gradually over a few weeks as tolerate.    Medications:  Bulk forming -- These include natural fiber and commercial fiber preparations such as Psyllium (Konsyl; Metamucil; Perdiem), Methylcellulose (Citrucel),  or Wheat dextrin (Benefiber);  You should increase the dose of fiber supplements slowly to prevent gas and cramping, and you should always take the supplement with plenty of fluid.  2. Hyperosmolar medications  -- such as   ?Polyethylene glycol (MiraLAX, GlycoLax)  ? Lactulose  ? Sorbitol  Polyethylene glycol is generally preferred since it does not cause gas or bloating and is available without a prescription. Lactulose and sorbitol can produce gas and bloating. Sorbitol works as well as lactulose and is much less expensive.  3.Saline laxatives -- such as Milk of Magnesia and magnesium citrate (Evac-Q-Mag) act similarly to the hyperosmolar drugs.  4. Stimulant drugs -- include Senna (eg, Black Draught, Ex-Lax, Senokot) and Bisacodil (eg, Correctol, Doxidan, Dulcolax).     Constipation treatments to avoid:  ? Emollients - Emollient laxatives, principally mineral oil, soften stools by moisturizing them. However, other treatments have fewer risks and equal benefit.  ? Natural products - A wide variety of natural products are advertised for constipation. Some of them contain the active ingredients found in commercially available laxatives. However, their dose and purity may not be carefully controlled. Thus, these products are not generally recommended.  ? A variety of home-made enema preparations have been used throughout the years, such as soapsuds, hydrogen peroxide, and household detergents. These can be extremely irritating to the lining of the intestine and should be avoided.           ______________________________________________________________________    Who do I call  with any questions after my visit?  Please be in touch if there are any further questions that arise following today's visit.  There are multiple ways to contact your gastroenterology care team.      During business hours, you may reach a Gastroenterology nurse at [phone], option 3.     To schedule or reschedule an appointment, please call [phone].   To schedule your imaging studies (CT, MRI, ultrasound)  call [phone] (or toll-free # [phone])  To schedule your lab work at [facility], please call [phone]    You can always send a secure message through [employer].  [employer] messages are answered by your nurse or doctor typically within 24 hours.  Please allow extra time on weekends and holidays.      For urgent/emergent questions after business hours, you may reach the on-call GI Fellow by contacting the [facility]  at [phone].    In order for your refill to be processed in a timely fashion, it is your responsibility to ensure you follow the recommendations from your provider regarding your laboratory studies and follow up appointments.       How will I get the results of any tests ordered?    You will receive all of your results.  If you have signed up for [employer], any tests ordered at your visit will be available to you after your physician reviews them.  Typically this takes 1-2 weeks.  If there are urgent results that require a change in your care plan, your physician or nurse will call you to discuss the next steps.   What is [employer]?  [employer] is a secure way for you to access all of your healthcare records from the [facility].  It is a web based computer program, so you can sign on to it from any location.  It also allows you to send secure messages to your care team.  I recommend signing up for [employer] access if you have not already done so and are comfortable with using a computer.    How to I schedule a follow-up visit?  If  you did not schedule a follow-up visit today, please call [phone] to schedule a follow-up office visit.      Sincerely,  FAB Cloud,  [facility],  Division of Gastroenterology   ([facility])

## 2023-10-16 NOTE — TELEPHONE ENCOUNTER
"Endoscopy Scheduling Screen    Have you had a positive Covid test in the last 14 days?  No    Are you active on MyChart?   Yes    What insurance is in the chart?  Other:  [employer]    Ordering/Referring Provider:     NOE VORA      (If ordering provider performs procedure, schedule with ordering provider unless otherwise instructed. )    BMI: Estimated body mass index is 22.12 kg/m  as calculated from the following:    Height as of an earlier encounter on 10/16/23: 1.588 m (5' 2.5\").    Weight as of an earlier encounter on 10/16/23: 55.7 kg (122 lb 14.4 oz).     Sedation Ordered  moderate sedation.   If patient BMI > 50 do not schedule in [facility].    If patient BMI > 45 do not schedule at [facility].    Are you taking methadone or Suboxone?  No    Are you taking any prescription medications for pain 3 or more times per week?   No    Do you have a history of malignant hyperthermia or adverse reaction to anesthesia?  No    (Females) Are you currently pregnant?   No     Have you been diagnosed or told you have pulmonary hypertension?   No    Do you have an LVAD?  No    Have you been told you have moderate to severe sleep apnea?  No    Have you been told you have COPD, asthma, or any other lung disease?  No    Do you have any heart conditions?  No     Have you ever had an organ transplant?   No    Have you ever had or are you awaiting a heart or lung transplant?   No    Have you had a stroke or transient ischemic attack (TIA aka \"mini stroke\" in the last 6 months?   No    Have you been diagnosed with or been told you have cirrhosis of the liver?   No    Are you currently on dialysis?   No    Do you need assistance transferring?   No    BMI: Estimated body mass index is 22.12 kg/m  as calculated from the following:    Height as of an earlier encounter on 10/16/23: 1.588 m (5' 2.5\").    Weight as of an earlier encounter on 10/16/23: 55.7 kg (122 lb 14.4 oz).     Is patients BMI > 40 and scheduling location [facility]?  No    Do you take an " injectable medication for weight loss or diabetes (excluding insulin)?  No    Do you take the medication Naltrexone?  No    Do you take blood thinners?  No       Prep   Are you currently on dialysis or do you have chronic kidney disease?  No    Do you have a diagnosis of diabetes?  No    Do you have a diagnosis of cystic fibrosis (CF)?  No    On a regular basis do you go 3 -5 days between bowel movements?  No    BMI > 40?  No    Preferred Pharmacy:      [id number] [place] [address] [address]  Phone: [phone] Fax: [phone]      Final Scheduling Details   Colonoscopy prep sent?  Standard MiraLAX    Procedure scheduled  Colonoscopy    Surgeon:  RODERICK     Date of procedure:  11/30     Pre-OP / PAC:   No - Not required for this site.    Location  [facility] - Per order.    Sedation   MAC/Deep Sedation  [facility]      Patient Reminders:   You will receive a call from a Nurse to review instructions and health history.  This assessment must be completed prior to your procedure.  Failure to complete the Nurse assessment may result in the procedure being cancelled.      On the day of your procedure, please designate an adult(s) who can drive you home stay with you for the next 24 hours. The medicines used in the exam will make you sleepy. You will not be able to drive.      You cannot take public transportation, ride share services, or non-medical taxi service without a responsible caregiver.  Medical transport services are allowed with the requirement that a responsible caregiver will receive you at your destination.  We require that drivers and caregivers are confirmed prior to your procedure.

## 2023-10-16 NOTE — LETTER
10/16/2023         RE: Yvette Weiss  [address] [facility] [id number]        Dear Colleague,    Thank you for referring your patient, Yvette Weiss, to the [facility]. Please see a copy of my visit note below.    Gastroenterology CLINIC VISIT, RETURN PATIENT    CC/REFERRING PROVIDER: Mana Rock  REASON FOR CONSULTATION: follow up on abdominal pain    HPI: 38 year old female presented to GI clinic for follow up on upper abdominal pain secondary to gallbladder dyskinesia. HIDA was suggestive for dyskinesia of gallbladder with EF of 31%. Upper GI endoscopy was unremarkable. Patient underwent laparoscopic cholecystectomy with Dr. Neil.  Reported resolution of her upper abdominal pain.  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  Patient completed treatment with Xarelto.  Right lung PE, which thought to be related to estrogen pill.   History of dyspareunia. Did not make a follow up appointment with GYN yet.  Patient complains of lower abdominal discomfort, not related to meal intake.  Said that when she sits down she experiences a shooting pain from her rectum up to her intestine.  She complained of alternation of diarrhea and constipation, felt it was due to IBS  and dyskinesia of gallbladder.  Patient stated that she increase dietary fiber and also taking a fiber supplement.  She has been having bowel movement every day, with intermittent delay in bowel elimination for 2 to 3 days.  She denies any significant straining.  Reported intermittent BRBPR; stated that she has hemorrhoids.  Has been taking over-the-counter creams and suppository for hemorrhoids.  Takes Tylenol or ibuprofen as needed for rectal pain.  Patient stated that she changed her diet significantly by eliminating dairy products, high fat foods, caffeine, and alcohol from her diet.  She is concerned that she lost approximately 10 pounds and asking if it is  related to her gallbladder surgery.  Explanation provided.  No new medications.  No new OTC supplements.  She denies any red flag symptoms.    PREVIOUS ENDOSCOPY:  5/3/2023 EGD  Findings:        The Z-line was regular and was found 36 cm from the incisors.        The entire examined stomach was normal. Biopsies were taken with a cold        forceps for Helicobacter pylori testing.        The examined duodenum was normal. Biopsies for histology were taken with        a cold forceps for evaluation of celiac disease.      Final Diagnosis   A(1).  Duodenum, biopsy:  -Small intestinal mucosa with inactive chronic peptic duodenitis.  -Normal villous architecture identified and no prominence in intraepithelial lymphocytes seen.  -Negative for luminal organisms.  -Negative for dysplasia or malignancy.      B(2). Stomach, antrum, biopsy  - Oxyntic type gastric mucosa with mild chronic inflammation.  - Negative for H. Pylori organisms on routine stains.  - Negative for intestinal metaplasia.   -Negative for dysplasia or malignancy     PERTINENT STUDIES Reviewed in EMR  5/24/23 HIDA  Impression:  Gallbladder ejection fraction is borderline decreased, these findings  can be seen with mild biliary dyskinesia.     5/24/23 Abdominal ultrasound  IMPRESSION:  1.  No sonographic findings to explain pain. Specifically no findings  to suggest acute cholecystitis.  2.  Incidental low risk gallbladder polyps measuring up to 4 mm. No  follow-up is recommended for low risk polyps 6 mm or smaller.     ROS: 10pt ROS performed and otherwise negative.    PAST MEDICAL HISTORY:  Past Medical History:   Diagnosis Date     Anxiety     as teenager     ASCUS on Pap smear 2011    neg for High Risk HPV     Chickenpox     x2     Depression     as teenager     Gestational diabetes 2016     Lateral epicondylitis 09/14/2004     Postpartum depression 04/2014    mild       PREVIOUS ABDOMINAL/GYNECOLOGIC SURGERIES:    Past Surgical History:   Procedure  Laterality Date     ESOPHAGOSCOPY, GASTROSCOPY, DUODENOSCOPY (EGD), COMBINED N/A 2023    Procedure: Esophagoscopy, gastroscopy, duodenoscopy (EGD), combined with biopsies;  Surgeon: Messi Neil MD;  [facility]     HERNIORRHAPHY UMBILICAL N/A 2023    Procedure: Herniorrhaphy umbilical;  Surgeon: Messi Neil MD;  [facility]     LAPAROSCOPIC CHOLECYSTECTOMY N/A 2023    Procedure: CHOLECYSTECTOMY, LAPAROSCOPIC;  Surgeon: Messi Neil MD;  [facility]     ORTHOPEDIC SURGERY      ORIF left wrist         PERTINENT MEDICATIONS:  Current Outpatient Medications   Medication Sig Dispense Refill     dicyclomine (BENTYL) 10 MG capsule Take 1 capsule (10 mg) by mouth 2 times daily as needed 60 capsule 0         SOCIAL HISTORY:  Social History     Socioeconomic History     Marital status:      Spouse name: Wesly     Number of children: 1     Years of education: Not on file     Highest education level: Not on file   Occupational History     Not on file   Tobacco Use     Smoking status: Former     Packs/day: 1.00     Years: 10.00     Additional pack years: 0.00     Total pack years: 10.00     Types: Cigarettes     Quit date: 2010     Years since quittin.7     Smokeless tobacco: Never   Vaping Use     Vaping Use: Never used   Substance and Sexual Activity     Alcohol use: Yes     Comment: occ     Drug use: No     Sexual activity: Yes     Partners: Male     Birth control/protection: Condom   Other Topics Concern     Parent/sibling w/ CABG, MI or angioplasty before 65F 55M? No   Social History Narrative     Not on file     Social Determinants of Health     Financial Resource Strain: Not on file   Food Insecurity: Not on file   Transportation Needs: Not on file   Physical Activity: Not on file   Stress: Not on file   Social Connections: Not on file   Interpersonal Safety: Not on file   Housing Stability: Not on file       FAMILY HISTORY:  Denies  "colon/panc/esophageal/other GI CA, no other Rodriguez or other HPS-related Juvencio. No IBD/celiac, no other AI/liver/thyroid disease.    Family History   Adopted: Yes   Problem Relation Age of Onset     Alcohol/Drug Mother         A&D     Alcohol/Drug Father         A&D     Parkinsonism Paternal Grandmother      Cerebrovascular Disease Maternal Grandmother         x3     Other - See Comments Sister         fetal alcohol syndrome     Cancer Maternal Grandfather         lung     Other Cancer Paternal Grandfather         Lung       PHYSICAL EXAMINATION:  Vitals reviewed  /62   Pulse 92   Temp 97.7  F (36.5  C) (Temporal)   Ht 1.588 m (5' 2.5\")   Wt 55.7 kg (122 lb 14.4 oz)   SpO2 100%   BMI 22.12 kg/m      General: Patient appears well in no acute distress.    Skin: No visualized rash or lesions on visualized skin  HEENT:    EOMI, no erythema, sclera icterus or discharge noted.  Mouth mucosa intact, pink, moist  No cervical or supraclavicular lymphadenopathy. Thyroid gland not enlarged.  Resp: breathing comfortably without accessory muscle usage, speaking in full sentences, no cough.   Card:  No LE edema.  Abdomen: Active bowel sounds X 4 quadrants. Soft to palpation.  Tenderness in the left lower quadrant, mild.  No organomegaly.  Well-healed surgical incisions.  No guarding or rebound tenderness. Enciso's sign negative.  MSK: Appears to have normal range of motion based on visualized movements  Neurologic: No apparent tremors, facial movements symmetric  Psych: affect normal, alert and oriented      ASSESSMENT/PLAN:    ICD-10-CM    1. Dyskinesia of gallbladder  K82.8       2. LLQ abdominal pain  R10.32 Colonoscopy Screening  Referral     Adult GI Clinic Follow-Up Order (Blank)     dicyclomine (BENTYL) 10 MG capsule      3. Hematochezia  K92.1 Colonoscopy Screening  Referral     Adult GI Clinic Follow-Up Order (Blank)      4. Alternating constipation and diarrhea  R19.8 dicyclomine (BENTYL) 10 MG " capsule      5. External hemorrhoids  K64.4          38 year old female  presented  to GI clinic for for a follow-up.  I initially saw the patient for complaints of upper abdominal discomfort, worse with deep breathing and cough.  She was referred for CT scan and found to have a right lung PEs and was placed on  Xeralto.  Follow-up CT scan of chest was negative for recurrent or chronic PEs, but there were 4 mm noncalcified lung nodules.  Suggested to follow-up with primary care provider or pulmonology.    Upper GI endoscopy was unremarkable. Due to continuous complaints of abdominal pain, she patient was referred to HIDA scan, which was suggestive for gallbladder dyskinesia with ejection fraction of 31%.  Patient underwent laparoscopic cholecystectomy with Dr. Neil in June.  Today, she reported some lower abdominal pain.  Her appetite had improved, but she is still on some dietary restriction to prevent possible exacerbation of pain.  She complains of continuous alternation of diarrhea and constipation.  Admits to persistent BRBPR after defecation.  Patient believes she has hemorrhoids, has been taking suppository and cream intermittently with mild relief in her rectal discomfort.  Continue complaining of abdominal bloating.  Said that she lost approximately 10 pounds over the past 2 months, which is likely due to dietary changes.  Differential diagnosis were discussed with the patient.  Recommended the following:  - Proceed with upper lower GI endoscopy to exclude IBD, anal fissure, or rectal ulcers.  Assessment of hemorrhoids will be performed during the procedure, deferred by patient today.  - Continue over-the-counter antihemorrhoidal preparations.  - Take dicyclomine as needed for abdominal discomfort or cramping.  - Continue lactose-free diet.  - Avoid NSAIDs.    Patient verbalized understanding and appreciation of care provided. Stated that all of the questions were answered to her/his satisfaction.  RTC  in 3-4 months    Thank you for this consultation. It was a pleasure to participate in the care of this patient; please contact us with any further questions.    JUNE Cloud, MIRANDA-JOHNNY  [facility]  Gastroenterology Department  [place], [place]    This note was created with Dragon voice recognition software, and while reviewed for accuracy, inadvertent minor typographic errors may occur. Please contact the provider if you have any questions.       Again, thank you for allowing me to participate in the care of your patient.        Sincerely,        JUNE CLOUD CNP

## 2023-10-30 DIAGNOSIS — R10.32 LLQ ABDOMINAL PAIN: ICD-10-CM

## 2023-10-30 DIAGNOSIS — R19.8 ALTERNATING CONSTIPATION AND DIARRHEA: ICD-10-CM

## 2023-10-30 RX ORDER — DICYCLOMINE HYDROCHLORIDE 10 MG/1
10 CAPSULE ORAL 2 TIMES DAILY PRN
Qty: 60 CAPSULE | Refills: 1 | Status: ON HOLD | OUTPATIENT
Start: 2023-10-30 | End: 2023-11-30

## 2023-10-30 NOTE — TELEPHONE ENCOUNTER
Dicylomine 10mg cap      Last Written Prescription Date:  10/16/2023  Last Fill Quantity: 60,   # refills: 0  Last Office Visit: 10/16/2023  Future Office visit:

## 2023-10-30 NOTE — TELEPHONE ENCOUNTER
Prescription approved per [facility] Refill Protocol.    Marzena Castle Rn

## 2023-11-30 ENCOUNTER — ANESTHESIA EVENT (OUTPATIENT)
Dept: GASTROENTEROLOGY | Facility: CLINIC | Age: 38
End: 2023-11-30
Payer: COMMERCIAL

## 2023-11-30 ENCOUNTER — ANESTHESIA (OUTPATIENT)
Dept: GASTROENTEROLOGY | Facility: CLINIC | Age: 38
End: 2023-11-30
Payer: COMMERCIAL

## 2023-11-30 ENCOUNTER — HOSPITAL ENCOUNTER (OUTPATIENT)
Facility: CLINIC | Age: 38
Discharge: HOME | End: 2023-11-30
Attending: SURGERY | Admitting: SURGERY
Payer: COMMERCIAL

## 2023-11-30 VITALS
BODY MASS INDEX: 21.76 KG/M2 | HEART RATE: 78 BPM | HEIGHT: 63 IN | SYSTOLIC BLOOD PRESSURE: 108 MMHG | OXYGEN SATURATION: 100 % | WEIGHT: 122.8 LBS | RESPIRATION RATE: 16 BRPM | TEMPERATURE: 98.5 F | DIASTOLIC BLOOD PRESSURE: 72 MMHG

## 2023-11-30 LAB — COLONOSCOPY: NORMAL

## 2023-11-30 PROCEDURE — 258N000003 HC RX IP 258 OP 636: Performed by: NURSE ANESTHETIST, CERTIFIED REGISTERED

## 2023-11-30 PROCEDURE — 250N000011 HC RX IP 250 OP 636: Performed by: NURSE ANESTHETIST, CERTIFIED REGISTERED

## 2023-11-30 PROCEDURE — 370N000017 HC ANESTHESIA TECHNICAL FEE, PER MIN: Performed by: SURGERY

## 2023-11-30 PROCEDURE — 45378 DIAGNOSTIC COLONOSCOPY: CPT | Performed by: SURGERY

## 2023-11-30 PROCEDURE — G0105 COLORECTAL SCRN; HI RISK IND: HCPCS | Performed by: SURGERY

## 2023-11-30 PROCEDURE — 250N000009 HC RX 250: Performed by: NURSE ANESTHETIST, CERTIFIED REGISTERED

## 2023-11-30 PROCEDURE — 250N000009 HC RX 250: Performed by: SURGERY

## 2023-11-30 RX ORDER — NALOXONE HYDROCHLORIDE 0.4 MG/ML
0.2 INJECTION, SOLUTION INTRAMUSCULAR; INTRAVENOUS; SUBCUTANEOUS
Status: DISCONTINUED | OUTPATIENT
Start: 2023-11-30 | End: 2023-11-30 | Stop reason: HOSPADM

## 2023-11-30 RX ORDER — LIDOCAINE HYDROCHLORIDE 20 MG/ML
INJECTION, SOLUTION INFILTRATION; PERINEURAL PRN
Status: DISCONTINUED | OUTPATIENT
Start: 2023-11-30 | End: 2023-11-30

## 2023-11-30 RX ORDER — LIDOCAINE 40 MG/G
CREAM TOPICAL
Status: DISCONTINUED | OUTPATIENT
Start: 2023-11-30 | End: 2023-11-30 | Stop reason: HOSPADM

## 2023-11-30 RX ORDER — PROPOFOL 10 MG/ML
INJECTION, EMULSION INTRAVENOUS CONTINUOUS PRN
Status: DISCONTINUED | OUTPATIENT
Start: 2023-11-30 | End: 2023-11-30

## 2023-11-30 RX ORDER — NALOXONE HYDROCHLORIDE 0.4 MG/ML
0.4 INJECTION, SOLUTION INTRAMUSCULAR; INTRAVENOUS; SUBCUTANEOUS
Status: DISCONTINUED | OUTPATIENT
Start: 2023-11-30 | End: 2023-11-30 | Stop reason: HOSPADM

## 2023-11-30 RX ORDER — PROPOFOL 10 MG/ML
INJECTION, EMULSION INTRAVENOUS PRN
Status: DISCONTINUED | OUTPATIENT
Start: 2023-11-30 | End: 2023-11-30

## 2023-11-30 RX ORDER — SODIUM CHLORIDE, SODIUM LACTATE, POTASSIUM CHLORIDE, CALCIUM CHLORIDE 600; 310; 30; 20 MG/100ML; MG/100ML; MG/100ML; MG/100ML
INJECTION, SOLUTION INTRAVENOUS CONTINUOUS
Status: DISCONTINUED | OUTPATIENT
Start: 2023-11-30 | End: 2023-11-30 | Stop reason: HOSPADM

## 2023-11-30 RX ORDER — ONDANSETRON 2 MG/ML
4 INJECTION INTRAMUSCULAR; INTRAVENOUS
Status: DISCONTINUED | OUTPATIENT
Start: 2023-11-30 | End: 2023-11-30 | Stop reason: HOSPADM

## 2023-11-30 RX ORDER — ONDANSETRON 4 MG/1
4 TABLET, ORALLY DISINTEGRATING ORAL EVERY 6 HOURS PRN
Status: DISCONTINUED | OUTPATIENT
Start: 2023-11-30 | End: 2023-11-30 | Stop reason: HOSPADM

## 2023-11-30 RX ORDER — FLUMAZENIL 0.1 MG/ML
0.2 INJECTION, SOLUTION INTRAVENOUS
Status: DISCONTINUED | OUTPATIENT
Start: 2023-11-30 | End: 2023-11-30 | Stop reason: HOSPADM

## 2023-11-30 RX ORDER — PROCHLORPERAZINE MALEATE 5 MG
10 TABLET ORAL EVERY 6 HOURS PRN
Status: DISCONTINUED | OUTPATIENT
Start: 2023-11-30 | End: 2023-11-30 | Stop reason: HOSPADM

## 2023-11-30 RX ORDER — ONDANSETRON 2 MG/ML
4 INJECTION INTRAMUSCULAR; INTRAVENOUS EVERY 6 HOURS PRN
Status: DISCONTINUED | OUTPATIENT
Start: 2023-11-30 | End: 2023-11-30 | Stop reason: HOSPADM

## 2023-11-30 RX ADMIN — LIDOCAINE HYDROCHLORIDE 0.1 ML: 10 INJECTION, SOLUTION EPIDURAL; INFILTRATION; INTRACAUDAL; PERINEURAL at 11:23

## 2023-11-30 RX ADMIN — PROPOFOL 200 MCG/KG/MIN: 10 INJECTION, EMULSION INTRAVENOUS at 11:35

## 2023-11-30 RX ADMIN — SODIUM CHLORIDE, POTASSIUM CHLORIDE, SODIUM LACTATE AND CALCIUM CHLORIDE: 600; 310; 30; 20 INJECTION, SOLUTION INTRAVENOUS at 11:23

## 2023-11-30 RX ADMIN — PROPOFOL 100 MG: 10 INJECTION, EMULSION INTRAVENOUS at 11:35

## 2023-11-30 RX ADMIN — LIDOCAINE HYDROCHLORIDE 50 MG: 20 INJECTION, SOLUTION INFILTRATION; PERINEURAL at 11:35

## 2023-11-30 ASSESSMENT — ACTIVITIES OF DAILY LIVING (ADL): ADLS_ACUITY_SCORE: 33

## 2023-11-30 ASSESSMENT — LIFESTYLE VARIABLES: TOBACCO_USE: 1

## 2023-11-30 NOTE — DISCHARGE INSTRUCTIONS
[facility]    Home Care Following Endoscopy          Activity:  You have just undergone an endoscopic procedure usually performed with conscious sedation.  Do not work or operate machinery (including a car) for at least 12 hours.    I encourage you to walk and attempt to pass this air as soon as possible.    Diet:  Return to the diet you were on before your procedure but eat lightly for the first 12-24 hours.  Drink plenty of water.  Resume any regular medications unless otherwise advised by your physician.  Please begin any new medication prescribed as a result of your procedure as directed by your physician.   If you had any biopsy or polyp removed please refrain from aspirin or aspirin products for 2 days.  If on Coumadin please restart as instructed by your physician.   Pain:  You may take Tylenol as needed for pain.  Expected Recovery:  You can expect some mild abdominal fullness and/or discomfort due to the air used to inflate your intestinal tract.     Call Your Physician if You Have:    After Colonoscopy:  Worsening persisting abdominal pain which is worse with activity.  Fevers (>101 degrees F), chills or shakes.  Passage of continued blood with bowel movements.     Any questions or concerns about your recovery, please call [phone] or after hours [phone] ([phone]) Nurse Advice Line.

## 2023-11-30 NOTE — ANESTHESIA CARE TRANSFER NOTE
Patient: Yvette Weiss    Procedure: Procedure(s):  Colonoscopy       Diagnosis: LLQ abdominal pain [R10.32]  Hematochezia [K92.1]  Diagnosis Additional Information: No value filed.    Anesthesia Type:   MAC     Note:    Oropharynx: oropharynx clear of all foreign objects and spontaneously breathing  Level of Consciousness: awake  Oxygen Supplementation: room air    Independent Airway: airway patency satisfactory and stable  Dentition: dentition unchanged  Vital Signs Stable: post-procedure vital signs reviewed and stable  Report to RN Given: handoff report given  Patient transferred to: Phase II    Handoff Report: Identifed the Patient, Identified the Reponsible Provider, Reviewed the pertinent medical history, Discussed the surgical course, Reviewed Intra-OP anesthesia mangement and issues during anesthesia, Set expectations for post-procedure period and Allowed opportunity for questions and acknowledgement of understanding      Vitals:  Vitals Value Taken Time   BP 96/61 11/30/23 1150   Temp     Pulse 84 11/30/23 1150   Resp     SpO2 99 % 11/30/23 1158   Vitals shown include unfiled device data.    Electronically Signed By: JUNE Casiano CRNA  November 30, 2023  11:59 AM

## 2023-11-30 NOTE — ANESTHESIA POSTPROCEDURE EVALUATION
Patient: Yvette Weiss    Procedure: Procedure(s):  Colonoscopy       Anesthesia Type:  MAC    Note:  Disposition: Outpatient   Postop Pain Control: Uneventful            Sign Out: Well controlled pain   PONV: No   Neuro/Psych: Uneventful            Sign Out: Acceptable/Baseline neuro status   Airway/Respiratory: Uneventful            Sign Out: Acceptable/Baseline resp. status   CV/Hemodynamics: Uneventful            Sign Out: Acceptable CV status   Other NRE: NONE   DID A NON-ROUTINE EVENT OCCUR? No    Event details/Postop Comments:  Pt was happy with anesthesia care.  No complications.  I will follow up with the pt if needed.           Last vitals:  Vitals Value Taken Time   /72 11/30/23 1220   Temp     Pulse 78 11/30/23 1220   Resp 16 11/30/23 1210   SpO2 100 % 11/30/23 1210       Electronically Signed By: JUNE Casiano CRNA  November 30, 2023  1:07 PM

## 2023-11-30 NOTE — H&P
Patient seen for Endoscopy    HPI:  Patient is a 38 year old female w hematochezia here for endoscopy. Not taking blood thinning medications. No MI or CVA history. No issues with previous sedation. No recent acute illness.    Review Of Systems    Skin: negative  Ears/Nose/Throat: negative  Respiratory: No shortness of breath, dyspnea on exertion, cough, or hemoptysis  Cardiovascular: negative  Gastrointestinal: negative  Genitourinary: negative  Musculoskeletal: negative  Neurologic: negative  Hematologic/Lymphatic/Immunologic: negative  Endocrine: negative      Past Medical History:   Diagnosis Date    Anxiety     as teenager    ASCUS on Pap smear 2011    neg for High Risk HPV    Chickenpox     x2    Depression     as teenager    Gestational diabetes 2016    Lateral epicondylitis 09/14/2004    Postpartum depression 04/2014    mild       Past Surgical History:   Procedure Laterality Date    ESOPHAGOSCOPY, GASTROSCOPY, DUODENOSCOPY (EGD), COMBINED N/A 5/2/2023    Procedure: Esophagoscopy, gastroscopy, duodenoscopy (EGD), combined with biopsies;  Surgeon: Messi Neil MD;  [facility]    HERNIORRHAPHY UMBILICAL N/A 6/23/2023    Procedure: Herniorrhaphy umbilical;  Surgeon: Messi Neil MD;  [facility]    LAPAROSCOPIC CHOLECYSTECTOMY N/A 6/23/2023    Procedure: CHOLECYSTECTOMY, LAPAROSCOPIC;  Surgeon: Messi Neil MD;  [facility]    ORTHOPEDIC SURGERY  1997    ORIF left wrist       Family History   Adopted: Yes   Problem Relation Age of Onset    Alcohol/Drug Mother         A&D    Alcohol/Drug Father         A&D    Parkinsonism Paternal Grandmother     Cerebrovascular Disease Maternal Grandmother         x3    Other - See Comments Sister         fetal alcohol syndrome    Cancer Maternal Grandfather         lung    Other Cancer Paternal Grandfather         Lung       Social History     Socioeconomic History    Marital status:      Spouse name: Wesly    Number of children: 1    Years of  education: Not on file    Highest education level: Not on file   Occupational History    Not on file   Tobacco Use    Smoking status: Former     Packs/day: 1.00     Years: 10.00     Additional pack years: 0.00     Total pack years: 10.00     Types: Cigarettes     Quit date: 2010     Years since quittin.8    Smokeless tobacco: Never   Vaping Use    Vaping Use: Never used   Substance and Sexual Activity    Alcohol use: Yes     Comment: occ    Drug use: No    Sexual activity: Yes     Partners: Male     Birth control/protection: Condom   Other Topics Concern    Parent/sibling w/ CABG, MI or angioplasty before 65F 55M? No   Social History Narrative    Not on file     Social Determinants of Health     Financial Resource Strain: Not on file   Food Insecurity: Not on file   Transportation Needs: Not on file   Physical Activity: Not on file   Stress: Not on file   Social Connections: Not on file   Interpersonal Safety: Not on file   Housing Stability: Not on file       No current outpatient medications on file.       Medications and history reviewed    Physical exam:  Vitals: There were no vitals taken for this visit.  BMI= There is no height or weight on file to calculate BMI.    Constitutional: Healthy, alert, non-distressed   Head: Normo-cephalic, atraumatic, no lesions, masses or tenderness   Cardiovascular: RRR, no new murmurs, +S1, +S2 heart sounds, no clicks, rubs or gallops   Respiratory: CTAB, no rales, rhonchi or wheezing, equal chest rise, good respiratory effort   Gastrointestinal: Soft, non-tender, non distended, no rebound rigidity or guarding, no masses or hernias palpated   : Deferred  Musculoskeletal: Moves all extremities, normal  strength, no deformities noted   Skin: No suspicious lesions or rashes   Psychiatric: Mentation appears normal, affect appropriate   Hematologic/Lymphatic/Immunologic: Normal cervical and supraclavicular lymph nodes   Patient able to get up on table without  difficulty.    Labs show:  No results found for this or any previous visit (from the past 24 hour(s)).    Assessment: Endoscopy  Plan: Pt cleared for anesthesia for proposed procedure.    Kevin Byrd DO

## 2023-11-30 NOTE — ANESTHESIA PREPROCEDURE EVALUATION
Anesthesia Pre-Procedure Evaluation    Patient: Yvette Weiss   MRN: [id number] : 1985        Procedure :   Procedure : Procedure(s):  Colonoscopy                Past Medical History:   Diagnosis Date    Anxiety     as teenager    ASCUS on Pap smear     neg for High Risk HPV    Chickenpox     x2    Depression     as teenager    Gestational diabetes 2016    Lateral epicondylitis 2004    Postpartum depression 2014    mild      Past Surgical History:   Procedure Laterality Date    ESOPHAGOSCOPY, GASTROSCOPY, DUODENOSCOPY (EGD), COMBINED N/A 2023    Procedure: Esophagoscopy, gastroscopy, duodenoscopy (EGD), combined with biopsies;  Surgeon: Messi Neil MD;  [facility]    HERNIORRHAPHY UMBILICAL N/A 2023    Procedure: Herniorrhaphy umbilical;  Surgeon: Messi Neil MD;  [facility]    LAPAROSCOPIC CHOLECYSTECTOMY N/A 2023    Procedure: CHOLECYSTECTOMY, LAPAROSCOPIC;  Surgeon: Messi Neil MD;  [facility]    ORTHOPEDIC SURGERY      ORIF left wrist      Allergies   Allergen Reactions    Latex Hives      Social History     Tobacco Use    Smoking status: Former     Packs/day: 1.00     Years: 10.00     Additional pack years: 0.00     Total pack years: 10.00     Types: Cigarettes     Quit date: 2010     Years since quittin.8    Smokeless tobacco: Never   Substance Use Topics    Alcohol use: Yes     Comment: occ      Wt Readings from Last 1 Encounters:   10/16/23 55.7 kg (122 lb 14.4 oz)        Anesthesia Evaluation   Pt has had prior anesthetic. Type: Regional and General.    No history of anesthetic complications       ROS/MED HX  ENT/Pulmonary: Comment: Quit Smokin/20/10 - neg pulmonary ROS   (+)                tobacco use, Past use,                      Neurologic:  - neg neurologic ROS     Cardiovascular:  - neg cardiovascular ROS   (+)  - -   -  - -                                 Previous cardiac testing   Echo: Date: Results:    Stress  "Test:  Date: Results:    ECG Reviewed:  Date: 3-16-23 Results:  [employer]  Cath:  Date: Results:      METS/Exercise Tolerance: >4 METS    Hematologic: Comments: On xeralto, had PE in April, resolved now.  Will get lovenox today    (+) History of blood clots,    pt is anticoagulated,           Musculoskeletal:  - neg musculoskeletal ROS     GI/Hepatic:  - neg GI/hepatic ROS   (+)        bowel prep,         (-) GERD   Renal/Genitourinary:  - neg Renal ROS     Endo:  - neg endo ROS     Psychiatric/Substance Use:  - neg psychiatric ROS   (+) psychiatric history anxiety and depression       Infectious Disease:  - neg infectious disease ROS     Malignancy:  - neg malignancy ROS     Other:  - neg other ROS          Physical Exam    Airway  airway exam normal      Mallampati: II   TM distance: > 3 FB   Neck ROM: full   Mouth opening: > 3 cm    Respiratory Devices and Support         Dental       (+) Minor Abnormalities - some fillings, tiny chips      Cardiovascular   cardiovascular exam normal       Rhythm and rate: regular and normal     Pulmonary   pulmonary exam normal        breath sounds clear to auscultation           OUTSIDE LABS:  CBC:   Lab Results   Component Value Date    WBC 5.8 03/16/2023    WBC 5.5 12/08/2022    HGB 12.2 03/16/2023    HGB 13.2 12/08/2022    HCT 37.4 03/16/2023    HCT 40.3 12/08/2022     03/16/2023     12/08/2022     BMP:   Lab Results   Component Value Date     03/16/2023     12/08/2022    POTASSIUM 4.1 03/16/2023    POTASSIUM 4.1 12/08/2022    CHLORIDE 104 03/16/2023    CHLORIDE 102 12/08/2022    CO2 24 03/16/2023    CO2 30 (H) 12/08/2022    BUN 10.2 03/16/2023    BUN 15.9 12/08/2022    CR 0.76 03/16/2023    CR 0.78 12/08/2022    GLC 94 03/16/2023     (H) 12/08/2022     COAGS: No results found for: \"PTT\", \"INR\", \"FIBR\"  POC:   Lab Results   Component Value Date     (H) 01/19/2016    HCG Negative 02/09/2012     HEPATIC: "   Lab Results   Component Value Date    ALBUMIN 4.7 12/08/2022    PROTTOTAL 7.7 12/08/2022    ALT 19 12/08/2022    AST 23 12/08/2022    ALKPHOS 68 12/08/2022    BILITOTAL 0.3 12/08/2022     OTHER:   Lab Results   Component Value Date    JACOB 8.8 03/16/2023    LIPASE 34 12/08/2022    TSH 1.44 08/01/2011    T4 1.44 08/01/2011       Anesthesia Plan    ASA Status:  1    NPO Status:  NPO Appropriate    Anesthesia Type: MAC.     - Reason for MAC: straight local not clinically adequate   Induction: Propofol.           Consents    Anesthesia Plan(s) and associated risks, benefits, and realistic alternatives discussed. Questions answered and patient/representative(s) expressed understanding.     - Discussed:     - Discussed with:  Patient      - Extended Intubation/Ventilatory Support Discussed: No.      - Patient is DNR/DNI Status: No     Use of blood products discussed: No .     Postoperative Care            Comments:    Other Comments: The risks and benefits of anesthesia, and the alternatives where applicable, have been discussed with the patient, and they wish to proceed.            JUNE Casiano CRNA

## 2024-01-15 ENCOUNTER — OFFICE VISIT (OUTPATIENT)
Dept: GASTROENTEROLOGY | Facility: CLINIC | Age: 39
End: 2024-01-15
Payer: COMMERCIAL

## 2024-01-15 VITALS
HEIGHT: 63 IN | HEART RATE: 76 BPM | DIASTOLIC BLOOD PRESSURE: 68 MMHG | WEIGHT: 114 LBS | SYSTOLIC BLOOD PRESSURE: 112 MMHG | BODY MASS INDEX: 20.2 KG/M2

## 2024-01-15 DIAGNOSIS — R35.0 URINARY FREQUENCY: ICD-10-CM

## 2024-01-15 DIAGNOSIS — K92.1 HEMATOCHEZIA: ICD-10-CM

## 2024-01-15 DIAGNOSIS — R10.9 FLANK PAIN: Primary | ICD-10-CM

## 2024-01-15 DIAGNOSIS — R10.32 LLQ ABDOMINAL PAIN: ICD-10-CM

## 2024-01-15 DIAGNOSIS — R19.8 IRREGULAR BOWEL HABITS: ICD-10-CM

## 2024-01-15 LAB
ALBUMIN SERPL BCG-MCNC: 4.9 G/DL (ref 3.5–5.2)
ALBUMIN UR-MCNC: NEGATIVE MG/DL
ALP SERPL-CCNC: 64 U/L (ref 40–150)
ALT SERPL W P-5'-P-CCNC: 15 U/L (ref 0–50)
ANION GAP SERPL CALCULATED.3IONS-SCNC: 8 MMOL/L (ref 7–15)
APPEARANCE UR: CLEAR
AST SERPL W P-5'-P-CCNC: 19 U/L (ref 0–45)
BILIRUB SERPL-MCNC: 0.4 MG/DL
BILIRUB UR QL STRIP: NEGATIVE
BUN SERPL-MCNC: 12.1 MG/DL (ref 6–20)
CALCIUM SERPL-MCNC: 9.3 MG/DL (ref 8.6–10)
CHLORIDE SERPL-SCNC: 104 MMOL/L (ref 98–107)
COLOR UR AUTO: YELLOW
CREAT SERPL-MCNC: 0.82 MG/DL (ref 0.51–0.95)
D DIMER PPP FEU-MCNC: <0.27 UG/ML FEU (ref 0–0.5)
DEPRECATED HCO3 PLAS-SCNC: 27 MMOL/L (ref 22–29)
EGFRCR SERPLBLD CKD-EPI 2021: >90 ML/MIN/1.73M2
ERYTHROCYTE [DISTWIDTH] IN BLOOD BY AUTOMATED COUNT: 12.6 % (ref 10–15)
GLUCOSE SERPL-MCNC: 100 MG/DL (ref 70–99)
GLUCOSE UR STRIP-MCNC: NEGATIVE MG/DL
HCT VFR BLD AUTO: 42.7 % (ref 35–47)
HGB BLD-MCNC: 13.8 G/DL (ref 11.7–15.7)
HGB UR QL STRIP: NEGATIVE
KETONES UR STRIP-MCNC: NEGATIVE MG/DL
LEUKOCYTE ESTERASE UR QL STRIP: NEGATIVE
LIPASE SERPL-CCNC: 27 U/L (ref 13–60)
MCH RBC QN AUTO: 28.5 PG (ref 26.5–33)
MCHC RBC AUTO-ENTMCNC: 32.3 G/DL (ref 31.5–36.5)
MCV RBC AUTO: 88 FL (ref 78–100)
NITRATE UR QL: NEGATIVE
PH UR STRIP: 5 [PH] (ref 5–7)
PLATELET # BLD AUTO: 185 10E3/UL (ref 150–450)
POTASSIUM SERPL-SCNC: 4.4 MMOL/L (ref 3.4–5.3)
PROT SERPL-MCNC: 8 G/DL (ref 6.4–8.3)
RBC # BLD AUTO: 4.85 10E6/UL (ref 3.8–5.2)
SODIUM SERPL-SCNC: 139 MMOL/L (ref 135–145)
SP GR UR STRIP: 1.01 (ref 1–1.03)
UROBILINOGEN UR STRIP-MCNC: NORMAL MG/DL
WBC # BLD AUTO: 4.2 10E3/UL (ref 4–11)

## 2024-01-15 PROCEDURE — 85379 FIBRIN DEGRADATION QUANT: CPT | Performed by: NURSE PRACTITIONER

## 2024-01-15 PROCEDURE — 80053 COMPREHEN METABOLIC PANEL: CPT | Performed by: NURSE PRACTITIONER

## 2024-01-15 PROCEDURE — 99214 OFFICE O/P EST MOD 30 MIN: CPT | Performed by: NURSE PRACTITIONER

## 2024-01-15 PROCEDURE — 85027 COMPLETE CBC AUTOMATED: CPT | Performed by: NURSE PRACTITIONER

## 2024-01-15 PROCEDURE — 36415 COLL VENOUS BLD VENIPUNCTURE: CPT | Performed by: NURSE PRACTITIONER

## 2024-01-15 PROCEDURE — 81003 URINALYSIS AUTO W/O SCOPE: CPT | Performed by: NURSE PRACTITIONER

## 2024-01-15 PROCEDURE — 83690 ASSAY OF LIPASE: CPT | Performed by: NURSE PRACTITIONER

## 2024-01-15 RX ORDER — VITAMIN A 3000 MCG
1 CAPSULE ORAL DAILY
COMMUNITY
End: 2024-09-19

## 2024-01-15 RX ORDER — MULTIVITAMIN WITH IRON
2 TABLET ORAL DAILY
COMMUNITY
End: 2024-09-19

## 2024-01-15 RX ORDER — POLYETHYLENE GLYCOL 3350 17 G/17G
1 POWDER, FOR SOLUTION ORAL DAILY
Qty: 510 G | Refills: 1 | Status: SHIPPED | OUTPATIENT
Start: 2024-01-15 | End: 2024-09-19

## 2024-01-15 ASSESSMENT — PAIN SCALES - GENERAL: PAINLEVEL: MILD PAIN (3)

## 2024-01-15 NOTE — PROGRESS NOTES
Gastroenterology CLINIC VISIT, RETURN PATIENT    CC/REFERRING PROVIDER: Mana Rock   REASON FOR CONSULTATION: follow up after colonoscopy    HPI: 39 year old female presented to GI clinic for follow up  Initially, I saw the patient for upper abdominal pain. Upper GI endoscopy was unremarkable. Her HIDA was suggestive for dyskinesia of gallbladder with EF of 31%.  Patient underwent laparoscopic cholecystectomy with Dr. Neil. Her pain had resolved for short period of time.  Today, patient complains of recurrence of left-sided abdominal pain and a new pain in the right flank.  Left-sided pain is associated with food intake, occurs within 20 to 30 minutes after a meal. Sweets and greasy foods make the symptoms worse.  She denies any nausea or vomiting.    Right flank pain is not related to food intake. She denies dyspnea.  Complains of occasional cough. 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  Patient completed treatment with Xarelto.  Patient is wondering if she could have another PE episode.    Continues complaining of bloating. Started taking vitamin supplements for health maintenance 2 months ago (see the patient medication list) but does not believe it is the cause of her bloating. She reported alternation of diarrhea and constipation. I referred the patient to colonoscopy for further evaluation although highly suspected functional bowel disease.  Colonoscopy was unremarkable except of hemorrhoids.  Prescribed dicyclomine to take as needed but patient did not try it. Suggested to stay on lactose free diet.    Patient is concerned of possible serious GI condition as a cause of her symptoms.  Asking for lab work and additional studies.  Stated that her mother was recently diagnosed with adenocarcinoma of liver.  Mother has history of breast cancer.    PREVIOUS ENDOSCOPY:  11/30/2023 Colonoscopy  Findings:       Hemorrhoids were found on perianal exam.         Non-bleeding internal hemorrhoids were found during retroflexion. The        hemorrhoids were Grade I (internal hemorrhoids that do not prolapse).        The exam was otherwise without abnormality.     5/3/2023 EGD  Findings:        The Z-line was regular and was found 36 cm from the incisors.        The entire examined stomach was normal. Biopsies were taken with a cold        forceps for Helicobacter pylori testing.        The examined duodenum was normal. Biopsies for histology were taken with        a cold forceps for evaluation of celiac disease.      Final Diagnosis   A(1).  Duodenum, biopsy:  -Small intestinal mucosa with inactive chronic peptic duodenitis.  -Normal villous architecture identified and no prominence in intraepithelial lymphocytes seen.  -Negative for luminal organisms.  -Negative for dysplasia or malignancy.      B(2). Stomach, antrum, biopsy  - Oxyntic type gastric mucosa with mild chronic inflammation.  - Negative for H. Pylori organisms on routine stains.  - Negative for intestinal metaplasia.   -Negative for dysplasia or malignancy      PERTINENT STUDIES Reviewed in EMR  5/24/23 HIDA  Impression:  Gallbladder ejection fraction is borderline decreased, these findings  can be seen with mild biliary dyskinesia.     5/24/23 Abdominal ultrasound  IMPRESSION:  1.  No sonographic findings to explain pain. Specifically no findings  to suggest acute cholecystitis.  2.  Incidental low risk gallbladder polyps measuring up to 4 mm. No  follow-up is recommended for low risk polyps 6 mm or smaller.        ROS: 10pt ROS performed and otherwise negative.    PAST MEDICAL HISTORY:  Past Medical History:   Diagnosis Date    Anxiety     as teenager    ASCUS on Pap smear 2011    neg for High Risk HPV    Chickenpox     x2    Depression     as teenager    Gestational diabetes 2016    Lateral epicondylitis 09/14/2004    Postpartum depression 04/2014    mild       PREVIOUS ABDOMINAL/GYNECOLOGIC SURGERIES:    Past  Surgical History:   Procedure Laterality Date    COLONOSCOPY N/A 2023    Procedure: Colonoscopy;  Surgeon: Keivn Byrd DO;  Location: [facility]    ESOPHAGOSCOPY, GASTROSCOPY, DUODENOSCOPY (EGD), COMBINED N/A 2023    Procedure: Esophagoscopy, gastroscopy, duodenoscopy (EGD), combined with biopsies;  Surgeon: Messi Neil MD;  Location: [facility]    HERNIORRHAPHY UMBILICAL N/A 2023    Procedure: Herniorrhaphy umbilical;  Surgeon: Messi Neil MD;  Location: [facility]    LAPAROSCOPIC CHOLECYSTECTOMY N/A 2023    Procedure: CHOLECYSTECTOMY, LAPAROSCOPIC;  Surgeon: Messi Neil MD;  Location: [facility]    ORTHOPEDIC SURGERY      ORIF left wrist         PERTINENT MEDICATIONS:  Current Outpatient Medications   Medication Sig Dispense Refill    cholecalciferol (VITAMIN D3) 125 mcg (5000 units) capsule Take 125 mcg by mouth daily      magnesium 250 MG tablet Take 2 tablets by mouth daily      Omega-3 Fatty Acids (OMEGA 3 500 PO) Take 1 capsule by mouth daily      polyethylene glycol (MIRALAX) 17 GM/Dose powder Take 17 g (1 Capful) by mouth daily 510 g 1    psyllium (METAMUCIL/KONSYL) 58.6 % powder Take 1 Tablespoonful by mouth daily      psyllium (METAMUCIL/KONSYL) capsule Take 2 capsules by mouth 2 times daily 360 capsule 3    Vitamin A 7.5 MG (68052 UT) CAPS Take 1 capsule by mouth daily      Vitamin K 100 MCG TABS Take 1 tablet by mouth daily           SOCIAL HISTORY:  Social History     Socioeconomic History    Marital status:      Spouse name: Wesly    Number of children: 1    Years of education: Not on file    Highest education level: Not on file   Occupational History    Not on file   Tobacco Use    Smoking status: Former     Packs/day: 1.00     Years: 10.00     Additional pack years: 0.00     Total pack years: 10.00     Types: Cigarettes     Quit date: 2010     Years since quittin.9    Smokeless tobacco: Never   Vaping Use    Vaping Use: Never used   Substance and  "Sexual Activity    Alcohol use: Yes     Comment: occ    Drug use: No    Sexual activity: Yes     Partners: Male     Birth control/protection: Condom   Other Topics Concern    Parent/sibling w/ CABG, MI or angioplasty before 65F 55M? No   Social History Narrative    Not on file     Social Determinants of Health     Financial Resource Strain: Not on file   Food Insecurity: Not on file   Transportation Needs: Not on file   Physical Activity: Not on file   Stress: Not on file   Social Connections: Not on file   Interpersonal Safety: Not on file   Housing Stability: Not on file       FAMILY HISTORY:  Denies colon/panc/esophageal/other GI CA, no other Rodriguez or other HPS-related Juvencio. No IBD/celiac, no other AI/liver/thyroid disease.    Family History   Adopted: Yes   Problem Relation Age of Onset    Alcohol/Drug Mother         A&D    Alcohol/Drug Father         A&D    Parkinsonism Paternal Grandmother     Cerebrovascular Disease Maternal Grandmother         x3    Other - See Comments Sister         fetal alcohol syndrome    Cancer Maternal Grandfather         lung    Other Cancer Paternal Grandfather         Lung       PHYSICAL EXAMINATION:  Vitals reviewed  /68 (BP Location: Right arm, Patient Position: Sitting, Cuff Size: Adult Regular)   Pulse 76   Ht 1.588 m (5' 2.5\")   Wt 51.7 kg (114 lb)   LMP 01/02/2024 (Exact Date)   Breastfeeding No   BMI 20.52 kg/m      General: Patient appears well in no acute distress.    Skin: No visualized rash or lesions on visualized skin  HEENT:    EOMI, no erythema, sclera icterus or discharge noted.  Mouth mucosa intact, pink, moist  No cervical or supraclavicular lymphadenopathy. Thyroid gland not enlarged.  Resp: breathing comfortably without accessory muscle usage, speaking in full sentences, no cough. Lung sounds clear  Card: Regular and rhythmic S1 and S2. No gallop or rub. No murmur.  No LE edema.  Abdomen: Active bowel sounds X 4 quadrants. Soft to palpation.  " Tenderness in the left periumbilical area.  Tenderness in the right upper quadrant.  Positive CVA on the right.  No guarding or rebound tenderness. Enciso's sign negative.  MSK: Appears to have normal range of motion based on visualized movements  Neurologic: No apparent tremors, facial movements symmetric  Psych: affect normal, alert and oriented      ASSESSMENT/PLAN:    ICD-10-CM    1. Flank pain  R10.9 CT Abdomen Pelvis w/o & w Contrast     D dimer quantitative     UA Macroscopic with reflex to Microscopic and Culture - Lab Collect     Adult GI Clinic Follow-Up Order (Blank)      2. LLQ abdominal pain  R10.32 Adult GI Clinic Follow-Up Order (Blank)     CT Abdomen Pelvis w/o & w Contrast     CBC with platelets     Comprehensive metabolic panel (BMP + Alb, Alk Phos, ALT, AST, Total. Bili, TP)     UA Macroscopic with reflex to Microscopic and Culture - Lab Collect     Lipase     polyethylene glycol (MIRALAX) 17 GM/Dose powder     psyllium (METAMUCIL/KONSYL) capsule     Adult GI Clinic Follow-Up Order (Blank)      3. Hematochezia  K92.1 Adult GI Clinic Follow-Up Order (Blank)     CBC with platelets      4. Urinary frequency  R35.0 UA Macroscopic with reflex to Microscopic and Culture - Lab Collect      5. Irregular bowel habits  R19.8 CT Abdomen Pelvis w/o & w Contrast     CBC with platelets     Comprehensive metabolic panel (BMP + Alb, Alk Phos, ALT, AST, Total. Bili, TP)     Lipase     polyethylene glycol (MIRALAX) 17 GM/Dose powder     psyllium (METAMUCIL/KONSYL) capsule         39 year old female  presented  to GI clinic for a follow-up on persistent lower abdominal discomfort, mainly on the left.  She also reported a new pain in the right flank.  Verbalizes concerns of urinary frequency, but denies dysuria.  UA was ordered.  Patient has history of dyskinesia of gallbladder, underwent laparoscopic cholecystectomy in June.  Stated that her abdominal pain improved temporarily.  Complains of significant abdominal  bloating and alternation between diarrhea and constipation. Functional bowel disease is highly suspected, but the patient verbalized fear of possible malignancy as her mother was recently diagnosed with metastatic hepatobiliary cancer.  Recommended the following:  -Start MiraLAX 17 g in the morning.  - Start Metamucil 1 to 2 capsules twice a day.  - CT scan of abdomen/pelvis will be ordered.  - Lab work today.  - Take dicyclomine as needed for abdominal cramping or pain.  - Stay on lactose-free diet.    Patient verbalized understanding and appreciation of care provided. Stated that all of the questions were answered to her/his satisfaction.  RTC in 1 month    Thank you for this consultation. It was a pleasure to participate in the care of this patient; please contact us with any further questions.    JUNE Cloud, GENOVEVAP-C  [facility]  Gastroenterology Department  [place], [place]    This note was created with Dragon voice recognition software, and while reviewed for accuracy, inadvertent minor typographic errors may occur. Please contact the provider if you have any questions.

## 2024-01-15 NOTE — PATIENT INSTRUCTIONS
"It was a pleasure taking care of you today.  I've included a brief summary of our discussion and care plan from today's visit below.  Please review this information with your primary care provider.  ______________________________________________________________________    My recommendations are summarized as follows:    1.  As we discussed today, I ordered blood and urine test for further evaluation.    2.  I also placed an order for CT scan of abdomen for evaluation of your pain.  They will contact you to schedule an appointment.    3.  Start taking MiraLAX 1 capful every morning to regulate your bowel movements.  You can reduce or increase the dose according to your stool consistency and frequency.    4.  Start taking Metamucil capsules twice a day.    5.  Take dicyclomine as needed for abdominal cramping and pain.    Return to GI Clinic in one month  to review your progress.    ______________________________________________________________________    Irritable bowel syndrome (IBS)   Irritable bowel syndrome (IBS) is a chronic condition of the digestive system. Its primary symptoms are abdominal pain and changes in bowel habits (eg, constipation and/or diarrhea).  There are a number of theories about how and why irritable bowel syndrome (IBS) develops. Despite intensive research, the cause is not clear.   -One theory suggests that IBS is caused by abnormal contractions of the colon and intestines (hence the term \"spastic bowel,\" which has sometimes been used to describe IBS).   -Some people develop IBS after a severe gastrointestinal infection (eg, Salmonella or Campylobacter, or viruses). However, it is not clear how the infection triggers IBS to develop, and most people with IBS do not have a history of these infections.  -People with IBS who seek medical help are more likely to suffer from anxiety and stress than those who do not seek help. Stress and anxiety are known to affect the intestine; thus, it is " "likely that anxiety and stress worsen symptoms.    -Food intolerances are common in patients with IBS, raising the possibility that it is caused by food sensitivity or allergy. This theory has been difficult to prove, although it continues to be studied.   A number of foods are known to cause symptoms that mimic or aggravate IBS, including dairy products (which contain lactose), legumes (such as beans), and cruciferous vegetables (such as broccoli, cauliflower, Miranda sprouts, and cabbage). These foods increase intestinal gas, which can cause cramps.    -Many researchers believe that IBS is caused by heightened sensitivity of the intestines. The medical term for this is \"visceral hyperalgesia.\" This theory proposes that nerves in the bowels are overactive in people with IBS, so that normal amounts of gas or movement are perceived as excessive and painful.     A person with irritable bowel syndrome may have frequent loose stools. Bowel movements usually occur during the daytime, and most often in the morning or after meals. Diarrhea is often preceded by a sense of extreme urgency and followed by a feeling of incomplete emptying. About one-half of people with IBS also notice mucous discharge with diarrhea. Diarrhea occurring during the night is very unusual with IBS.     Treatments are often given to reduce the pain and other symptoms of IBS, and it may be necessary to try more than one combination of treatments to find the one that is most helpful for you.  Diet:  The first step in treating IBS is usually to monitor your symptoms, daily bowel habits, and any other factors that may affect your bowels. This can help to identify factors that worsen symptoms in some people with IBS, such as lactose or other food intolerances and stress. Keeping a daily diary to track your diet and bowel symptoms can be helpful.  Many clinicians recommend temporarily eliminating milk products, since lactose intolerance is common and can " aggravate IBS or cause symptoms similar to IBS. The greatest concentration of lactose is found in milk and ice cream, although it is present in smaller quantities in yogurt, cottage and other cheeses.Try eliminating dairy for 2 weeks. If IBS symptoms improve, it is reasonable to continue avoiding lactose.   Many foods are only partially digested in the small intestines. When they reach the colon (large intestine), further digestion takes place, which may cause gas and cramps. Eliminating these foods temporarily is reasonable if gas or bloating is bothersome.  The most common gas-producing foods are legumes (such as beans) and cruciferous vegetables (such as cabbage, [place] sprouts, cauliflower, and broccoli). In addition, some people have trouble with onions, celery, carrots, raisins, bananas, apricots, prunes, sprouts, and wheat.    A bulk-forming fiber supplement, such as Psyllium, may also be recommended to increase fiber intake since it is difficult to consume enough fiber in the diet. Fiber supplements should be started at a low dose and increased slowly over several weeks to reduce the symptoms of excessive intestinal gas, which can occur in some people when beginning fiber therapy.     Some foods are easy to digest for people with IBS related diarrhea. These include the following: plain pasta or noodles, white rice. Boiled or baked potato. Whole white bread, Singaporean bread, plain fish, plain chicken or turkey, soft-boiled eggs, rice or soy milk, cooked carrots,and cereals (plain Cornflakes, Rice Krispies, Corn or Rice Chex, or Cheerios).    Other approaches to management of IBS:  Stress and anxiety can worsen IBS in some people. The best approach for reducing stress and anxiety depends upon your situation and the severity of your symptoms.  Although many drugs are available to treat the symptoms of IBS, these drugs do not cure the condition. They are mainly used to relieve symptoms.        ______________________________________________________________________    Who do I call with any questions after my visit?  Please be in touch if there are any further questions that arise following today's visit.  There are multiple ways to contact your gastroenterology care team.      During business hours, you may reach a Gastroenterology nurse at [phone], option 3.     To schedule or reschedule an appointment, please call [phone].   To schedule your imaging studies (CT, MRI, ultrasound)  call [phone] (or toll-free # [phone])  To schedule your lab work at [facility], please call [phone]    You can always send a secure message through Lucibel.  Lucibel messages are answered by your nurse or doctor typically within 24 hours.  Please allow extra time on weekends and holidays.      For urgent/emergent questions after business hours, you may reach the on-call GI Fellow by contacting the [facility]  at [phone].    In order for your refill to be processed in a timely fashion, it is your responsibility to ensure you follow the recommendations from your provider regarding your laboratory studies and follow up appointments.       How will I get the results of any tests ordered?    You will receive all of your results.  If you have signed up for [employer]t, any tests ordered at your visit will be available to you after your physician reviews them.  Typically this takes 1-2 weeks.  If there are urgent results that require a change in your care plan, your physician or nurse will call you to discuss the next steps.   What is Lucibel?  Lucibel is a secure way for you to access all of your healthcare records from the [facility].  It is a web based computer program, so you can sign on to it from any location.  It also allows you to send secure messages to your care team.  I recommend signing up for Lucibel access if you have not already done so  and are comfortable with using a computer.    How to I schedule a follow-up visit?  If you did not schedule a follow-up visit today, please call [phone] to schedule a follow-up office visit.      Sincerely,  FAB Cloud,  [facility],  Division of Gastroenterology   ([facility])

## 2024-01-15 NOTE — LETTER
1/15/2024         RE: Yvette Weiss  [address] [id number]        Dear Colleague,    Thank you for referring your patient, Yvette Weiss, to the [facility]. Please see a copy of my visit note below.    Gastroenterology CLINIC VISIT, RETURN PATIENT    CC/REFERRING PROVIDER: Mana Rock   REASON FOR CONSULTATION: follow up after colonoscopy    HPI: 39 year old female presented to GI clinic for follow up  Initially, I saw the patient for upper abdominal pain. Upper GI endoscopy was unremarkable. Her HIDA was suggestive for dyskinesia of gallbladder with EF of 31%.  Patient underwent laparoscopic cholecystectomy with Dr. Neil. Her pain had resolved for short period of time.  Today, patient complains of recurrence of left-sided abdominal pain and a new pain in the right flank.  Left-sided pain is associated with food intake, occurs within 20 to 30 minutes after a meal. Sweets and greasy foods make the symptoms worse.  She denies any nausea or vomiting.    Right flank pain is not related to food intake. She denies dyspnea.  Complains of occasional cough. 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  Patient completed treatment with Xarelto.  Patient is wondering if she could have another PE episode.    Continues complaining of bloating. Started taking vitamin supplements for health maintenance 2 months ago (see the patient medication list) but does not believe it is the cause of her bloating. She reported alternation of diarrhea and constipation. I referred the patient to colonoscopy for further evaluation although highly suspected functional bowel disease.  Colonoscopy was unremarkable except of hemorrhoids.  Prescribed dicyclomine to take as needed but patient did not try it. Suggested to stay on lactose free diet.    Patient is concerned of possible serious GI condition as a cause of her symptoms.  Asking for lab  work and additional studies.  Stated that her mother was recently diagnosed with adenocarcinoma of liver.  Mother has history of breast cancer.    PREVIOUS ENDOSCOPY:  11/30/2023 Colonoscopy  Findings:       Hemorrhoids were found on perianal exam.        Non-bleeding internal hemorrhoids were found during retroflexion. The        hemorrhoids were Grade I (internal hemorrhoids that do not prolapse).        The exam was otherwise without abnormality.     5/3/2023 EGD  Findings:        The Z-line was regular and was found 36 cm from the incisors.        The entire examined stomach was normal. Biopsies were taken with a cold        forceps for Helicobacter pylori testing.        The examined duodenum was normal. Biopsies for histology were taken with        a cold forceps for evaluation of celiac disease.      Final Diagnosis   A(1).  Duodenum, biopsy:  -Small intestinal mucosa with inactive chronic peptic duodenitis.  -Normal villous architecture identified and no prominence in intraepithelial lymphocytes seen.  -Negative for luminal organisms.  -Negative for dysplasia or malignancy.      B(2). Stomach, antrum, biopsy  - Oxyntic type gastric mucosa with mild chronic inflammation.  - Negative for H. Pylori organisms on routine stains.  - Negative for intestinal metaplasia.   -Negative for dysplasia or malignancy      PERTINENT STUDIES Reviewed in EMR  5/24/23 HIDA  Impression:  Gallbladder ejection fraction is borderline decreased, these findings  can be seen with mild biliary dyskinesia.     5/24/23 Abdominal ultrasound  IMPRESSION:  1.  No sonographic findings to explain pain. Specifically no findings  to suggest acute cholecystitis.  2.  Incidental low risk gallbladder polyps measuring up to 4 mm. No  follow-up is recommended for low risk polyps 6 mm or smaller.        ROS: 10pt ROS performed and otherwise negative.    PAST MEDICAL HISTORY:  Past Medical History:   Diagnosis Date     Anxiety     as teenager      ASCUS on Pap smear 2011    neg for High Risk HPV     Chickenpox     x2     Depression     as teenager     Gestational diabetes 2016     Lateral epicondylitis 09/14/2004     Postpartum depression 04/2014    mild       PREVIOUS ABDOMINAL/GYNECOLOGIC SURGERIES:    Past Surgical History:   Procedure Laterality Date     COLONOSCOPY N/A 11/30/2023    Procedure: Colonoscopy;  Surgeon: Kevin Byrd DO;  Location: [facility]     ESOPHAGOSCOPY, GASTROSCOPY, DUODENOSCOPY (EGD), COMBINED N/A 5/2/2023    Procedure: Esophagoscopy, gastroscopy, duodenoscopy (EGD), combined with biopsies;  Surgeon: Messi Neil MD;  Location: [facility]     HERNIORRHAPHY UMBILICAL N/A 6/23/2023    Procedure: Herniorrhaphy umbilical;  Surgeon: Messi Neil MD;  Location: [facility]     LAPAROSCOPIC CHOLECYSTECTOMY N/A 6/23/2023    Procedure: CHOLECYSTECTOMY, LAPAROSCOPIC;  Surgeon: Messi Neil MD;  Location: [facility]     ORTHOPEDIC SURGERY  1997    ORIF left wrist         PERTINENT MEDICATIONS:  Current Outpatient Medications   Medication Sig Dispense Refill     cholecalciferol (VITAMIN D3) 125 mcg (5000 units) capsule Take 125 mcg by mouth daily       magnesium 250 MG tablet Take 2 tablets by mouth daily       Omega-3 Fatty Acids (OMEGA 3 500 PO) Take 1 capsule by mouth daily       polyethylene glycol (MIRALAX) 17 GM/Dose powder Take 17 g (1 Capful) by mouth daily 510 g 1     psyllium (METAMUCIL/KONSYL) 58.6 % powder Take 1 Tablespoonful by mouth daily       psyllium (METAMUCIL/KONSYL) capsule Take 2 capsules by mouth 2 times daily 360 capsule 3     Vitamin A 7.5 MG (33103 UT) CAPS Take 1 capsule by mouth daily       Vitamin K 100 MCG TABS Take 1 tablet by mouth daily           SOCIAL HISTORY:  Social History     Socioeconomic History     Marital status:      Spouse name: Wesly     Number of children: 1     Years of education: Not on file     Highest education level: Not on file   Occupational History     Not on file   Tobacco Use  "    Smoking status: Former     Packs/day: 1.00     Years: 10.00     Additional pack years: 0.00     Total pack years: 10.00     Types: Cigarettes     Quit date: 2010     Years since quittin.9     Smokeless tobacco: Never   Vaping Use     Vaping Use: Never used   Substance and Sexual Activity     Alcohol use: Yes     Comment: occ     Drug use: No     Sexual activity: Yes     Partners: Male     Birth control/protection: Condom   Other Topics Concern     Parent/sibling w/ CABG, MI or angioplasty before 65F 55M? No   Social History Narrative     Not on file     Social Determinants of Health     Financial Resource Strain: Not on file   Food Insecurity: Not on file   Transportation Needs: Not on file   Physical Activity: Not on file   Stress: Not on file   Social Connections: Not on file   Interpersonal Safety: Not on file   Housing Stability: Not on file       FAMILY HISTORY:  Denies colon/panc/esophageal/other GI CA, no other Rodriguez or other HPS-related Juvencio. No IBD/celiac, no other AI/liver/thyroid disease.    Family History   Adopted: Yes   Problem Relation Age of Onset     Alcohol/Drug Mother         A&D     Alcohol/Drug Father         A&D     Parkinsonism Paternal Grandmother      Cerebrovascular Disease Maternal Grandmother         x3     Other - See Comments Sister         fetal alcohol syndrome     Cancer Maternal Grandfather         lung     Other Cancer Paternal Grandfather         Lung       PHYSICAL EXAMINATION:  Vitals reviewed  /68 (BP Location: Right arm, Patient Position: Sitting, Cuff Size: Adult Regular)   Pulse 76   Ht 1.588 m (5' 2.5\")   Wt 51.7 kg (114 lb)   LMP 2024 (Exact Date)   Breastfeeding No   BMI 20.52 kg/m      General: Patient appears well in no acute distress.    Skin: No visualized rash or lesions on visualized skin  HEENT:    EOMI, no erythema, sclera icterus or discharge noted.  Mouth mucosa intact, pink, moist  No cervical or supraclavicular lymphadenopathy. " Thyroid gland not enlarged.  Resp: breathing comfortably without accessory muscle usage, speaking in full sentences, no cough. Lung sounds clear  Card: Regular and rhythmic S1 and S2. No gallop or rub. No murmur.  No LE edema.  Abdomen: Active bowel sounds X 4 quadrants. Soft to palpation.  Tenderness in the left periumbilical area.  Tenderness in the right upper quadrant.  Positive CVA on the right.  No guarding or rebound tenderness. Enciso's sign negative.  MSK: Appears to have normal range of motion based on visualized movements  Neurologic: No apparent tremors, facial movements symmetric  Psych: affect normal, alert and oriented      ASSESSMENT/PLAN:    ICD-10-CM    1. Flank pain  R10.9 CT Abdomen Pelvis w/o & w Contrast     D dimer quantitative     UA Macroscopic with reflex to Microscopic and Culture - Lab Collect     Adult GI Clinic Follow-Up Order (Blank)      2. LLQ abdominal pain  R10.32 Adult GI Clinic Follow-Up Order (Blank)     CT Abdomen Pelvis w/o & w Contrast     CBC with platelets     Comprehensive metabolic panel (BMP + Alb, Alk Phos, ALT, AST, Total. Bili, TP)     UA Macroscopic with reflex to Microscopic and Culture - Lab Collect     Lipase     polyethylene glycol (MIRALAX) 17 GM/Dose powder     psyllium (METAMUCIL/KONSYL) capsule     Adult GI Clinic Follow-Up Order (Blank)      3. Hematochezia  K92.1 Adult GI Clinic Follow-Up Order (Blank)     CBC with platelets      4. Urinary frequency  R35.0 UA Macroscopic with reflex to Microscopic and Culture - Lab Collect      5. Irregular bowel habits  R19.8 CT Abdomen Pelvis w/o & w Contrast     CBC with platelets     Comprehensive metabolic panel (BMP + Alb, Alk Phos, ALT, AST, Total. Bili, TP)     Lipase     polyethylene glycol (MIRALAX) 17 GM/Dose powder     psyllium (METAMUCIL/KONSYL) capsule         39 year old female  presented  to GI clinic for a follow-up on persistent lower abdominal discomfort, mainly on the left.  She also reported a new pain  in the right flank.  Verbalizes concerns of urinary frequency, but denies dysuria.  UA was ordered.  Patient has history of dyskinesia of gallbladder, underwent laparoscopic cholecystectomy in June.  Stated that her abdominal pain improved temporarily.  Complains of significant abdominal bloating and alternation between diarrhea and constipation. Functional bowel disease is highly suspected, but the patient verbalized fear of possible malignancy as her mother was recently diagnosed with metastatic hepatobiliary cancer.  Recommended the following:  -Start MiraLAX 17 g in the morning.  - Start Metamucil 1 to 2 capsules twice a day.  - CT scan of abdomen/pelvis will be ordered.  - Lab work today.  - Take dicyclomine as needed for abdominal cramping or pain.  - Stay on lactose-free diet.    Patient verbalized understanding and appreciation of care provided. Stated that all of the questions were answered to her/his satisfaction.  RTC in 1 month    Thank you for this consultation. It was a pleasure to participate in the care of this patient; please contact us with any further questions.    JUNE Cloud, FNP-C  [facility]  Gastroenterology Department  [place], [place]    This note was created with Dragon voice recognition software, and while reviewed for accuracy, inadvertent minor typographic errors may occur. Please contact the provider if you have any questions.       Again, thank you for allowing me to participate in the care of your patient.        Sincerely,        JUNE CLOUD CNP

## 2024-01-22 ENCOUNTER — HOSPITAL ENCOUNTER (OUTPATIENT)
Dept: CT IMAGING | Facility: CLINIC | Age: 39
Discharge: HOME | End: 2024-01-22
Attending: NURSE PRACTITIONER | Admitting: NURSE PRACTITIONER
Payer: COMMERCIAL

## 2024-01-22 DIAGNOSIS — R19.8 IRREGULAR BOWEL HABITS: ICD-10-CM

## 2024-01-22 DIAGNOSIS — R10.32 LLQ ABDOMINAL PAIN: ICD-10-CM

## 2024-01-22 DIAGNOSIS — R10.9 FLANK PAIN: ICD-10-CM

## 2024-01-22 PROCEDURE — 250N000009 HC RX 250: Performed by: NURSE PRACTITIONER

## 2024-01-22 PROCEDURE — 74177 CT ABD & PELVIS W/CONTRAST: CPT

## 2024-01-22 PROCEDURE — 250N000011 HC RX IP 250 OP 636: Performed by: NURSE PRACTITIONER

## 2024-01-22 RX ORDER — IOPAMIDOL 755 MG/ML
500 INJECTION, SOLUTION INTRAVASCULAR ONCE
Status: COMPLETED | OUTPATIENT
Start: 2024-01-22 | End: 2024-01-22

## 2024-01-22 RX ADMIN — IOPAMIDOL 60 ML: 755 INJECTION, SOLUTION INTRAVENOUS at 09:08

## 2024-01-22 RX ADMIN — SODIUM CHLORIDE 60 ML: 9 INJECTION, SOLUTION INTRAVENOUS at 09:08

## 2024-04-07 ENCOUNTER — HEALTH MAINTENANCE LETTER (OUTPATIENT)
Age: 39
End: 2024-04-07

## 2024-06-16 ENCOUNTER — HEALTH MAINTENANCE LETTER (OUTPATIENT)
Age: 39
End: 2024-06-16

## 2024-09-07 ENCOUNTER — OFFICE VISIT (OUTPATIENT)
Dept: URGENT CARE | Facility: URGENT CARE | Age: 39
End: 2024-09-07
Payer: COMMERCIAL

## 2024-09-07 VITALS
TEMPERATURE: 97.4 F | OXYGEN SATURATION: 100 % | HEART RATE: 72 BPM | SYSTOLIC BLOOD PRESSURE: 110 MMHG | RESPIRATION RATE: 16 BRPM | DIASTOLIC BLOOD PRESSURE: 67 MMHG | BODY MASS INDEX: 21.06 KG/M2 | WEIGHT: 117 LBS

## 2024-09-07 DIAGNOSIS — R21 RASH: ICD-10-CM

## 2024-09-07 DIAGNOSIS — H02.849 SWELLING OF EYELID, UNSPECIFIED LATERALITY: Primary | ICD-10-CM

## 2024-09-07 DIAGNOSIS — J30.1 SEASONAL ALLERGIC RHINITIS DUE TO POLLEN: ICD-10-CM

## 2024-09-07 PROCEDURE — 99213 OFFICE O/P EST LOW 20 MIN: CPT | Performed by: PHYSICIAN ASSISTANT

## 2024-09-07 RX ORDER — PREDNISONE 20 MG/1
40 TABLET ORAL DAILY
Qty: 10 TABLET | Refills: 0 | Status: SHIPPED | OUTPATIENT
Start: 2024-09-07 | End: 2024-09-12

## 2024-09-07 RX ORDER — TRIAMCINOLONE ACETONIDE 1 MG/G
CREAM TOPICAL
Qty: 30 G | Refills: 0 | Status: SHIPPED | OUTPATIENT
Start: 2024-09-07

## 2024-09-07 ASSESSMENT — PAIN SCALES - GENERAL: PAINLEVEL: NO PAIN (1)

## 2024-09-07 NOTE — PROGRESS NOTES
Assessment & Plan     Swelling of eyelid, unspecified laterality  Will treat with prednisone burst, likely related to untreated seasonal allergies. Can also try over the counter hydrocortisone cream two times daily as needed. Avoid touching/rubbing the area. Return to clinic if symptoms worsen or do not improve; otherwise follow up as needed      - predniSONE (DELTASONE) 20 MG tablet; Take 2 tablets (40 mg) by mouth daily for 5 days.    Rash    - triamcinolone (KENALOG) 0.1 % external cream; Apply sparingly to affected area three times daily for 7-14 days.    Seasonal allergies   Start daily antihistamine. Can also add over the counter antihistamine eye drops if needed. Return to clinic if symptoms worsen or do not improve; otherwise follow up as needed            Return in about 1 week (around 9/14/2024), or if symptoms worsen or fail to improve.            Subjective     Chief Complaint   Patient presents with    Urgent Care    Eye Problem     Per patient states for the past 4 weeks she has been having redness and pressure on left eye that is now traveling to right eye, states she has had left side head pain, with some vision changes          HPI     Eye problem     Onset of symptoms was 1 week(s) ago.  Course of illness is worsening.    Severity moderate  Current and Associated symptoms: bilateral eyelid swelling, itching, redness  Treatment measures tried include cool and warm compress, auqaphor.  Predisposing factors include history of seasonal allergies, has been gardening.                    Objective    /67   Pulse 72   Temp 97.4  F (36.3  C) (Tympanic)   Resp 16   Wt 53.1 kg (117 lb)   LMP 09/03/2024   SpO2 100%   BMI 21.06 kg/m    Body mass index is 21.06 kg/m .  Physical Exam  Constitutional:       Appearance: She is well-developed.   HENT:      Head: Normocephalic.      Right Ear: Tympanic membrane and ear canal normal.      Left Ear: Tympanic membrane and ear canal normal.   Eyes:       Conjunctiva/sclera: Conjunctivae normal.      Right eye: Right conjunctiva is not injected. No exudate.     Left eye: Left conjunctiva is not injected. No exudate.     Comments: There is redness and swelling of the skin of both eye lids and the skin under left eye. No lesions    Cardiovascular:      Rate and Rhythm: Normal rate.      Heart sounds: Normal heart sounds.   Pulmonary:      Effort: Pulmonary effort is normal.      Breath sounds: Normal breath sounds.   Skin:     General: Skin is warm and dry.      Findings: Rash (eczema type on right neck) present.   Psychiatric:         Behavior: Behavior normal.                    Signed Electronically by: Susie Avila PA-C

## 2024-09-19 ENCOUNTER — OFFICE VISIT (OUTPATIENT)
Dept: FAMILY MEDICINE | Facility: CLINIC | Age: 39
End: 2024-09-19
Payer: COMMERCIAL

## 2024-09-19 VITALS
SYSTOLIC BLOOD PRESSURE: 114 MMHG | WEIGHT: 116 LBS | BODY MASS INDEX: 20.55 KG/M2 | TEMPERATURE: 98.9 F | HEART RATE: 94 BPM | RESPIRATION RATE: 14 BRPM | OXYGEN SATURATION: 100 % | HEIGHT: 63 IN | DIASTOLIC BLOOD PRESSURE: 70 MMHG

## 2024-09-19 DIAGNOSIS — H02.849 SWELLING OF EYELID, UNSPECIFIED LATERALITY: ICD-10-CM

## 2024-09-19 DIAGNOSIS — Z12.31 VISIT FOR SCREENING MAMMOGRAM: ICD-10-CM

## 2024-09-19 DIAGNOSIS — N63.41 SUBAREOLAR MASS OF RIGHT BREAST: ICD-10-CM

## 2024-09-19 DIAGNOSIS — N63.42 SUBAREOLAR MASS OF LEFT BREAST: Primary | ICD-10-CM

## 2024-09-19 PROCEDURE — 99213 OFFICE O/P EST LOW 20 MIN: CPT | Performed by: STUDENT IN AN ORGANIZED HEALTH CARE EDUCATION/TRAINING PROGRAM

## 2024-09-19 ASSESSMENT — PAIN SCALES - GENERAL: PAINLEVEL: MILD PAIN (2)

## 2024-09-19 NOTE — PROGRESS NOTES
Assessment & Plan     Subareolar mass of left breast  Subareolar mass of right breast  Patient is a 39 year old who presents today for breast mass concerns. She previously has had right breast cyst. Left mass is tender. Right breast noted to have more superficial thickening of the areola, almost appears more as a nevus, at the 9 o'clock position. Left breast with estimated 1 cm round mildly mobile mass, possibly cyst. Nipples otherwise appear normal, no other masses. Will have her complete below workup.   - MA Diagnostic Digital Bilateral  - US Breast Bilateral Limited 1-3 Quadrants    Swelling of eyelid, unspecified laterality  Was seen in  for this. Is improved from when she was seen. But does continue to have some mild edema L>R eyelids both upper and lower. Continues to have a dull aching behind left eye. Ophthalmoscope exam normal today, however, is limited. Recommended scheduling ASAP with optometry vs ophthalmology for more formal eye evaluation. Will also have her trial anti-allergy OTC eye drops at least over the weekend to see if that provides any relief. Low concern for IIH or brain mass, no other neurologic symptoms and symptoms are not severe.     Visit for screening mammogram  Will obtain diagnostic mammo as above.         Subjective   Yvette is a 39 year old, presenting for the following health issues:  Breast Pain (X 3 weeks) and Eye Problem (Pain behind left eye, x 1 month getting worst)        9/19/2024     1:56 PM   Additional Questions   Roomed by Janina MELGAR   Accompanied by Self     History of Present Illness       Reason for visit:  A lump in both breasts, left breast lump painful, pressure behind left eye and left side of head   She is taking medications regularly.       Breast Concern  Onset/Duration: 3 weeks  Description:   Location: Left side 3 o'clock, right side 9 o'clock  Pain or tenderness: YES- left side  Redness: No  Intensity: mild  Progression of Symptoms: same  Accompanying  "Signs & Symptoms:  Any lumps in axillary region: No  Movable: maybe  Nipple discharge: no  Changes in the skin or nipple: hard \"bump\"  On Hormone therapy: No  Does it change with menstrual cycle: No  Previous history of similar problem: yes- cyst 15 years ago  First degree relative with breast cancer: a positive family history for breast cancer in her maternal grandmother.  Precipitating factors:           Worsened by: no  Alleviating factors:            Improved by: no  Therapies tried and outcome: None    Few weeks ago daughter slammed her head into elft breast, was a little painful, sos he did a self breast exam, just below and beside the nipple noticed lump.  No nipple discharge.   Hard lumps she's noticed on the nipples.   No pain or swellng into armpits.   Biological mother's side. Grandmother on that side.     Pain behind left eye is really bothering her.     Gets rashes, put like a haling ointment  Didn't resolve  Both eyes swollen.   Told d/t allergies  Took the meds, but the pain behind the left eye into left head hasn't gone away.   Eyelid bothering her eye, \"come over my eye\"  Not like a headache, just a pressure that she is continuously feeling.   This pressure started over a month ago.   Tylenol, ibuprofen - goes away a little, but then comes right back.   Left side of face \"feels different\" almost like a numbness. Less sensation than the right.   Chronic nasal drainage, not different than typical.   Took the steroid, helped, but then rebounded back to more swollen.     Patient's last menstrual period was 09/03/2024.      Review of Systems  Constitutional, HEENT, cardiovascular, pulmonary, gi and gu systems are negative, except as otherwise noted.      Objective    /70 (BP Location: Right arm, Patient Position: Sitting, Cuff Size: Adult Regular)   Pulse 94   Temp 98.9  F (37.2  C) (Tympanic)   Resp 14   Ht 1.588 m (5' 2.5\")   Wt 52.6 kg (116 lb)   LMP 09/03/2024   SpO2 100%   BMI 20.88 " kg/m    Body mass index is 20.88 kg/m .  Physical Exam  Constitutional:       Appearance: Normal appearance.   HENT:      Head: Normocephalic.   Eyes:      General: No scleral icterus.     Extraocular Movements: Extraocular movements intact.      Conjunctiva/sclera: Conjunctivae normal.      Funduscopic exam:     Right eye: No papilledema.         Left eye: No papilledema.      Comments: Normal but limited ophtho exam today. Eyelids very mildly swollen bilaterally L>R both upper and lower.   Cardiovascular:      Rate and Rhythm: Normal rate.   Pulmonary:      Effort: Pulmonary effort is normal.   Chest:       Neurological:      General: No focal deficit present.      Mental Status: She is alert and oriented to person, place, and time.                Signed Electronically by: Estrella Nesbitt MD

## 2024-10-01 NOTE — PATIENT INSTRUCTIONS
Considering patient's symptoms of daytime sleepiness and insomnia recommend treatment of mild sleep apnea.    If she does not want to trial CPAP, okay to refer patient to dentistry, snoring and sleep apnea treatment centers in [place].       Still unclear cause of the pelvic and abdominal pain.    Considerations include scar from tears during delivery, endometriosis, other ovarian or uterus abnormalities.    Upper abdominal pain causes may include acid reflux, gallstones, pancreas disease, other digestive tract conditions.  Not highly suspect that pain is from umbilical hernia.    Labs ordered to day to rule out possible causes.  You will be contacted in 1-2 days for results of your lab tests.     To schedule the pelvic ultrasound , call [phone].     Referral to gynecology has been signed. Schedulers will call you in the next 3-5 business days.     Read more information about your conditions in the handouts attached to this visit summary.

## 2024-10-08 ENCOUNTER — HOSPITAL ENCOUNTER (OUTPATIENT)
Dept: MAMMOGRAPHY | Facility: CLINIC | Age: 39
Discharge: HOME | End: 2024-10-08
Attending: STUDENT IN AN ORGANIZED HEALTH CARE EDUCATION/TRAINING PROGRAM
Payer: COMMERCIAL

## 2024-10-08 ENCOUNTER — HOSPITAL ENCOUNTER (OUTPATIENT)
Dept: ULTRASOUND IMAGING | Facility: CLINIC | Age: 39
Discharge: HOME | End: 2024-10-08
Attending: STUDENT IN AN ORGANIZED HEALTH CARE EDUCATION/TRAINING PROGRAM
Payer: COMMERCIAL

## 2024-10-08 DIAGNOSIS — N63.42 SUBAREOLAR MASS OF LEFT BREAST: ICD-10-CM

## 2024-10-08 DIAGNOSIS — N63.41 SUBAREOLAR MASS OF RIGHT BREAST: ICD-10-CM

## 2024-10-08 PROCEDURE — 77066 DX MAMMO INCL CAD BI: CPT

## 2024-10-08 PROCEDURE — 76642 ULTRASOUND BREAST LIMITED: CPT | Mod: 50

## 2024-10-15 ENCOUNTER — MYC MEDICAL ADVICE (OUTPATIENT)
Dept: FAMILY MEDICINE | Facility: CLINIC | Age: 39
End: 2024-10-15
Payer: COMMERCIAL

## 2024-10-15 DIAGNOSIS — Z82.49 FAMILY HISTORY OF BRAIN ANEURYSM: Primary | ICD-10-CM

## 2024-12-19 ENCOUNTER — PATIENT OUTREACH (OUTPATIENT)
Dept: CARE COORDINATION | Facility: CLINIC | Age: 39
End: 2024-12-19
Payer: COMMERCIAL

## 2025-05-01 ENCOUNTER — TELEPHONE (OUTPATIENT)
Dept: FAMILY MEDICINE | Facility: CLINIC | Age: 40
End: 2025-05-01
Payer: COMMERCIAL

## 2025-05-01 NOTE — TELEPHONE ENCOUNTER
Patient Quality Outreach    Patient is due for the following:   Cervical Cancer Screening - PAP Needed  Physical Preventive Adult Physical    Action(s) Taken:   Schedule a Adult Preventative    Type of outreach:    Sent [employer] message.    Questions for provider review:    None         Janina Ibanez MA  Chart routed to .

## 2025-06-21 ENCOUNTER — HEALTH MAINTENANCE LETTER (OUTPATIENT)
Age: 40
End: 2025-06-21

## (undated) DEVICE — SOL WATER IRRIG 1000ML BOTTLE 07139-09

## (undated) DEVICE — ESU GROUND PAD UNIVERSAL W/O CORD

## (undated) DEVICE — LUBRICATING JELLY 4.25OZ

## (undated) DEVICE — ESU CORD MONOPOLAR HIGH FREQUENCY 26006M-D/10

## (undated) DEVICE — TUBING SUCTION 12"X1/4" N612

## (undated) DEVICE — ENDO TROCAR SLEEVE KII Z-THREADED 05X100MM CTS02

## (undated) DEVICE — SOL NACL 0.9% INJ 1000ML BAG 07983-09

## (undated) DEVICE — SU DERMABOND ADVANCED .7ML DNX12

## (undated) DEVICE — PACK GENERAL LAPAOSCOPY

## (undated) DEVICE — KIT ENDO TURNOVER/PROCEDURE CARRY-ON 101822

## (undated) DEVICE — ENDO POUCH UNIV RETRIEVAL SYSTEM INZII 10MM CD001

## (undated) DEVICE — ESU HOOK TIP 5MM CONMED

## (undated) DEVICE — ESU ENDO SCISSORS 5MM CVD 5DCS

## (undated) DEVICE — SU VICRYL 3-0 SH 27" UND J416H

## (undated) DEVICE — SU MONOCRYL 4-0 PS-2 18" UND Y496G

## (undated) DEVICE — ENDO TROCAR FIRST ENTRY KII FIOS Z-THRD 11X100MM CTF33

## (undated) DEVICE — GLOVE EXAM NITRILE LG

## (undated) DEVICE — ESU SUCTION/IRRIGATION SYSTEM PISTOL GRIP

## (undated) DEVICE — DEVICE SUTURE GRASPER TROCAR CLOSURE 14GA PMITCSG

## (undated) DEVICE — SU PDS II 1 CT-1 MONOFIL Z347H

## (undated) DEVICE — ENDO TROCAR FIRST ENTRY KII FIOS Z-THRD 05X100MM CTF03

## (undated) DEVICE — ENDO CLIP CARTIRDGE K2 MED/LG 10 1112

## (undated) DEVICE — GLOVE BIOGEL PI ULTRATOUCH G SZ 7.5 42175

## (undated) DEVICE — SUCTION MANIFOLD NEPTUNE 2 SYS 4 PORT 0702-020-000

## (undated) RX ORDER — LIDOCAINE HYDROCHLORIDE 10 MG/ML
INJECTION, SOLUTION EPIDURAL; INFILTRATION; INTRACAUDAL; PERINEURAL
Status: DISPENSED
Start: 2023-06-23

## (undated) RX ORDER — PROPOFOL 10 MG/ML
INJECTION, EMULSION INTRAVENOUS
Status: DISPENSED
Start: 2023-06-23

## (undated) RX ORDER — BUPIVACAINE HYDROCHLORIDE AND EPINEPHRINE 2.5; 5 MG/ML; UG/ML
INJECTION, SOLUTION EPIDURAL; INFILTRATION; INTRACAUDAL; PERINEURAL
Status: DISPENSED
Start: 2023-06-23

## (undated) RX ORDER — FENTANYL CITRATE 50 UG/ML
INJECTION, SOLUTION INTRAMUSCULAR; INTRAVENOUS
Status: DISPENSED
Start: 2023-06-23

## (undated) RX ORDER — ONDANSETRON 2 MG/ML
INJECTION INTRAMUSCULAR; INTRAVENOUS
Status: DISPENSED
Start: 2023-06-23

## (undated) RX ORDER — KETOROLAC TROMETHAMINE 30 MG/ML
INJECTION, SOLUTION INTRAMUSCULAR; INTRAVENOUS
Status: DISPENSED
Start: 2023-06-23

## (undated) RX ORDER — DIMENHYDRINATE 50 MG/ML
INJECTION, SOLUTION INTRAMUSCULAR; INTRAVENOUS
Status: DISPENSED
Start: 2023-06-23

## (undated) RX ORDER — LIDOCAINE HYDROCHLORIDE 10 MG/ML
INJECTION, SOLUTION EPIDURAL; INFILTRATION; INTRACAUDAL; PERINEURAL
Status: DISPENSED
Start: 2023-11-30